# Patient Record
Sex: FEMALE | Race: WHITE | Employment: OTHER | URBAN - METROPOLITAN AREA
[De-identification: names, ages, dates, MRNs, and addresses within clinical notes are randomized per-mention and may not be internally consistent; named-entity substitution may affect disease eponyms.]

---

## 2017-01-03 ENCOUNTER — TELEPHONE (OUTPATIENT)
Dept: INTERNAL MEDICINE CLINIC | Facility: CLINIC | Age: 57
End: 2017-01-03

## 2017-01-03 DIAGNOSIS — J34.2 DEVIATED SEPTUM: Primary | ICD-10-CM

## 2017-01-05 NOTE — TELEPHONE ENCOUNTER
Patient contacted advise to schedule appointment for referral. Patient has not seen Dr. Kaya Perkins recently for Deviated septum and sinus problems. Patient verbalized understanding, appointment scheduled.

## 2017-01-11 ENCOUNTER — TELEPHONE (OUTPATIENT)
Dept: INTERNAL MEDICINE CLINIC | Facility: CLINIC | Age: 57
End: 2017-01-11

## 2017-01-11 ENCOUNTER — OFFICE VISIT (OUTPATIENT)
Dept: INTERNAL MEDICINE CLINIC | Facility: CLINIC | Age: 57
End: 2017-01-11

## 2017-01-11 ENCOUNTER — HOSPITAL ENCOUNTER (OUTPATIENT)
Dept: GENERAL RADIOLOGY | Age: 57
Discharge: HOME | End: 2017-01-11
Attending: INTERNAL MEDICINE
Payer: MEDICAID

## 2017-01-11 VITALS
WEIGHT: 158 LBS | BODY MASS INDEX: 28 KG/M2 | RESPIRATION RATE: 18 BRPM | SYSTOLIC BLOOD PRESSURE: 112 MMHG | DIASTOLIC BLOOD PRESSURE: 72 MMHG | HEART RATE: 60 BPM

## 2017-01-11 DIAGNOSIS — G43.909 MIGRAINE WITHOUT STATUS MIGRAINOSUS, NOT INTRACTABLE, UNSPECIFIED MIGRAINE TYPE: ICD-10-CM

## 2017-01-11 DIAGNOSIS — J34.2 DEVIATED SEPTUM: Primary | ICD-10-CM

## 2017-01-11 DIAGNOSIS — M47.816 OSTEOARTHRITIS OF LUMBAR SPINE, UNSPECIFIED SPINAL OSTEOARTHRITIS COMPLICATION STATUS: ICD-10-CM

## 2017-01-11 DIAGNOSIS — R05.9 COUGH: Primary | ICD-10-CM

## 2017-01-11 DIAGNOSIS — R05.9 COUGH: ICD-10-CM

## 2017-01-11 DIAGNOSIS — R06.83 SNORING: ICD-10-CM

## 2017-01-11 PROCEDURE — 99213 OFFICE O/P EST LOW 20 MIN: CPT | Performed by: INTERNAL MEDICINE

## 2017-01-11 PROCEDURE — 99212 OFFICE O/P EST SF 10 MIN: CPT | Performed by: INTERNAL MEDICINE

## 2017-01-11 PROCEDURE — 71020 XR CHEST PA + LAT CHEST (CPT=71020): CPT

## 2017-01-11 RX ORDER — ALBUTEROL SULFATE 90 UG/1
2 AEROSOL, METERED RESPIRATORY (INHALATION) EVERY 6 HOURS PRN
Qty: 1 INHALER | Refills: 0 | Status: SHIPPED | OUTPATIENT
Start: 2017-01-11 | End: 2018-04-24

## 2017-01-11 RX ORDER — METHOCARBAMOL 750 MG/1
750 TABLET, FILM COATED ORAL 3 TIMES DAILY
Qty: 90 TABLET | Refills: 0 | Status: SHIPPED | OUTPATIENT
Start: 2017-01-11 | End: 2018-01-23

## 2017-01-12 NOTE — PROGRESS NOTES
HPI:    Patient ID: Judith Vaughan is a 64year old female.   Presents for follow-up on cough, concern of snoring    HPI  Patient reports that she was seen at the clinic about 1 month ago for evaluation of the cough, she completed course of antibiotic but co Units total) by mouth once a week.  Disp: 12 capsule Rfl: 0   omeprazole 20 MG Oral Capsule Delayed Release TAKE ONE CAPSULE BY MOUTH TWO TIMES A DAY Disp: 60 capsule Rfl: 10   Amitriptyline HCl 10 MG Oral Tab Take 1 tablet (10 mg total) by mouth nightly as osteoarthritis complication status continue care under pain management, patient will try it Robaxin report if it is helpful  Cough check chest x-ray follow-up as needed,    No orders of the defined types were placed in this encounter.        Meds This Visit

## 2017-01-19 ENCOUNTER — OFFICE VISIT (OUTPATIENT)
Dept: OTOLARYNGOLOGY | Facility: CLINIC | Age: 57
End: 2017-01-19

## 2017-01-19 VITALS
WEIGHT: 156 LBS | DIASTOLIC BLOOD PRESSURE: 60 MMHG | BODY MASS INDEX: 27.64 KG/M2 | HEIGHT: 63 IN | TEMPERATURE: 99 F | SYSTOLIC BLOOD PRESSURE: 100 MMHG

## 2017-01-19 DIAGNOSIS — R06.83 SNORING: Primary | ICD-10-CM

## 2017-01-19 DIAGNOSIS — J34.2 DEVIATED NASAL SEPTUM: ICD-10-CM

## 2017-01-19 PROCEDURE — 99214 OFFICE O/P EST MOD 30 MIN: CPT | Performed by: OTOLARYNGOLOGY

## 2017-01-19 PROCEDURE — 99212 OFFICE O/P EST SF 10 MIN: CPT | Performed by: OTOLARYNGOLOGY

## 2017-01-19 NOTE — PROGRESS NOTES
Sudeep Hay is a 64year old female. Patient presents with:  Snoring      HISTORY OF PRESENT ILLNESS    She presents with a long history of snoring. I have seen her in the past for a deviated septum. She states that she [place] con[place] colon ca   • Cancer Paternal Uncle      brain ca   • Diabetes Neg    • Glaucoma Neg    • Macular degeneration Neg        Past Medical History   Diagnosis Date   • Depression    • Gastritis    • Diverticulosis    • GERD (gastroesophageal reflux disease) features - Normal. Eyebrows - Normal. Skull - Normal.        Nasopharynx Normal External nose - Normal. Lips/teeth/gums - Normal. Tonsils - Normal. Oropharynx - Normal.   Ears Normal Inspection - Right: Normal, Left: Normal. Canal - Right: Normal, Left: No STUDY    2. Deviated nasal septum  Severe snoring that can be heard from another unit in her Building. Chronic daytime fatigue as well as falling asleep easily. Chronic nasal obstruction from a deviated septum.  I have recommended a sleep study with CPAP ti

## 2017-02-07 ENCOUNTER — TELEPHONE (OUTPATIENT)
Dept: INTERNAL MEDICINE CLINIC | Facility: CLINIC | Age: 57
End: 2017-02-07

## 2017-02-07 DIAGNOSIS — D22.9 NEVUS: Primary | ICD-10-CM

## 2017-02-07 NOTE — TELEPHONE ENCOUNTER
NK pls see note below and advise if appropriate? Referral pended above if approved; need dx code. Thanks.

## 2017-02-07 NOTE — TELEPHONE ENCOUNTER
Pt states has 2 brown spots on stomach, spots are growing, itch  Declined appt with Dr Marina Marcus- requesting referral to Dr Klein/Saniya  Has 3/9 appt

## 2017-03-02 ENCOUNTER — TELEPHONE (OUTPATIENT)
Dept: INTERNAL MEDICINE CLINIC | Facility: CLINIC | Age: 57
End: 2017-03-02

## 2017-03-14 ENCOUNTER — TELEPHONE (OUTPATIENT)
Dept: FAMILY MEDICINE CLINIC | Facility: CLINIC | Age: 57
End: 2017-03-14

## 2017-03-14 ENCOUNTER — OFFICE VISIT (OUTPATIENT)
Dept: PAIN CLINIC | Facility: HOSPITAL | Age: 57
End: 2017-03-14
Attending: NURSE PRACTITIONER
Payer: MEDICAID

## 2017-03-14 ENCOUNTER — OFFICE VISIT (OUTPATIENT)
Dept: PAIN CLINIC | Facility: HOSPITAL | Age: 57
End: 2017-03-14

## 2017-03-14 VITALS
BODY MASS INDEX: 28.35 KG/M2 | HEIGHT: 63 IN | WEIGHT: 160 LBS | HEART RATE: 68 BPM | SYSTOLIC BLOOD PRESSURE: 89 MMHG | DIASTOLIC BLOOD PRESSURE: 56 MMHG

## 2017-03-14 DIAGNOSIS — M47.816 OSTEOARTHRITIS OF LUMBAR SPINE, UNSPECIFIED SPINAL OSTEOARTHRITIS COMPLICATION STATUS: Primary | ICD-10-CM

## 2017-03-14 PROCEDURE — 99211 OFF/OP EST MAY X REQ PHY/QHP: CPT

## 2017-03-14 RX ORDER — HYDROCODONE BITARTRATE AND ACETAMINOPHEN 10; 325 MG/1; MG/1
1 TABLET ORAL EVERY 4 HOURS PRN
Qty: 180 TABLET | Refills: 0 | Status: CANCELLED | OUTPATIENT
Start: 2017-04-13

## 2017-03-14 RX ORDER — HYDROCODONE BITARTRATE AND ACETAMINOPHEN 10; 325 MG/1; MG/1
1 TABLET ORAL EVERY 4 HOURS PRN
Qty: 180 TABLET | Refills: 0 | Status: CANCELLED | OUTPATIENT
Start: 2017-03-14

## 2017-03-14 RX ORDER — HYDROCODONE BITARTRATE AND ACETAMINOPHEN 10; 325 MG/1; MG/1
1 TABLET ORAL EVERY 4 HOURS PRN
Qty: 180 TABLET | Refills: 0 | Status: CANCELLED | OUTPATIENT
Start: 2017-05-13

## 2017-03-14 NOTE — TELEPHONE ENCOUNTER
Pt is calling state that the pain management is not taking her insurance want to know if Dr Weston Drafts can recommend someone else  Pt is requesting a call back

## 2017-03-14 NOTE — TELEPHONE ENCOUNTER
Please call pt, do  Give pt  Number  And place referal   To managed care , encorage pt to call her insurance    For help if needed to find  new provider

## 2017-03-14 NOTE — TELEPHONE ENCOUNTER
Please see below. Do you want us to recommend [facility] [place] [phone] who does take [employer]?

## 2017-03-14 NOTE — PROGRESS NOTES
3-14-17 patient presents in a great amount of pain 10/10 crying growning lying down on bed, rushing. She is complaining of chronic back back that goes down both legs without numbness or tingling. Wakes due to pain. No injury involved per patient.   Maria Ines Albert

## 2017-03-14 NOTE — CHRONIC PAIN
MEDICATION EVALUATION    HISTORY OF PRESENT ILLNESS:  Gonzales Arizmendi is a 64year old old female with history of chronic low back pain returns for medication evaluation. Patient continues to report low back pain.  She reports worsening pain and states \"its above  Coughing/sneezing/straining does not exacerbate the pain.   Numbness/tingling: as above  Weakness: as above  Weight Loss: Negative   Fever: Negative   Cardiovascular:  No current chest pain or palpitations   Respiratory:  No current shortness of [place] ca   • Breast Cancer Maternal Grandmother 50     d. 54   • Cancer Maternal Grandfather 76     colon ca   • Cancer Mother 61     non-Hodgkins lymphoma (cause of death)   • Cancer Brother 64     Hodgkins   • Colon Cancer Maternal Uncle 61     (cause of death) old  female, with  History of lumbago secondary to lumbar DDD. Patients history reviewed and it is noted that patient admits to running out of medications last visit. Narcotic agreement reviewed with patient.  Patient given final verbal warning regarding ta

## 2017-03-15 NOTE — TELEPHONE ENCOUNTER
Hodan Palafox is looking into this as this may be a different issue other than insurance. Please see office visit note from 3/14/17 with BEST Fajardo.

## 2017-03-16 ENCOUNTER — OFFICE VISIT (OUTPATIENT)
Dept: DERMATOLOGY CLINIC | Facility: CLINIC | Age: 57
End: 2017-03-16

## 2017-03-16 ENCOUNTER — TELEPHONE (OUTPATIENT)
Dept: INTERNAL MEDICINE CLINIC | Facility: CLINIC | Age: 57
End: 2017-03-16

## 2017-03-16 DIAGNOSIS — D22.9 MULTIPLE NEVI: ICD-10-CM

## 2017-03-16 DIAGNOSIS — D23.60 BENIGN NEOPLASM OF SKIN OF UPPER LIMB, INCLUDING SHOULDER, UNSPECIFIED LATERALITY: ICD-10-CM

## 2017-03-16 DIAGNOSIS — D23.4 BENIGN NEOPLASM OF SCALP AND SKIN OF NECK: ICD-10-CM

## 2017-03-16 DIAGNOSIS — D48.5 NEOPLASM OF UNCERTAIN BEHAVIOR OF SKIN: Primary | ICD-10-CM

## 2017-03-16 DIAGNOSIS — D23.5 BENIGN NEOPLASM OF SKIN OF TRUNK, EXCEPT SCROTUM: ICD-10-CM

## 2017-03-16 DIAGNOSIS — D23.30 BENIGN NEOPLASM OF SKIN OF FACE: ICD-10-CM

## 2017-03-16 DIAGNOSIS — D23.70 BENIGN NEOPLASM OF SKIN OF LOWER LIMB, INCLUDING HIP, UNSPECIFIED LATERALITY: ICD-10-CM

## 2017-03-16 PROCEDURE — 99213 OFFICE O/P EST LOW 20 MIN: CPT | Performed by: DERMATOLOGY

## 2017-03-16 PROCEDURE — 99212 OFFICE O/P EST SF 10 MIN: CPT | Performed by: DERMATOLOGY

## 2017-03-16 PROCEDURE — 88305 TISSUE EXAM BY PATHOLOGIST: CPT | Performed by: DERMATOLOGY

## 2017-03-16 NOTE — TELEPHONE ENCOUNTER
Spoke to patient, she states that will help problem solve, she will stay with a pain clinic in near future, right now she has 3 months of medications.

## 2017-03-21 NOTE — PROGRESS NOTES
Quick Note:    The pathology report from last visit showed sk'd both . Please log in test results, send biopsy results letter.  Pt to rtc 1 year or prn.  ______

## 2017-04-03 NOTE — PROGRESS NOTES
Kati Bergeron is a 64year old female. HPI:     CC:  Patient presents with:  Full Skin Exam: Established pt (LOV 2014) requesting full skin exam. Pt concerned with multiple lesions on hips. Hx of BCC and mildly dysplastic nevus.          Allergies:  Review Current Outpatient Prescriptions:  Albuterol Sulfate HFA (VENTOLIN HFA) 108 (90 BASE) MCG/ACT Inhalation Aero Soln Inhale 2 puffs into the lungs every 6 (six) hours as needed for Wheezing.  Disp: 1 Inhaler Rfl: 0   methocarbamol (ROBAXIN-750) 750 MG O Cigarettes    Last Attempt to Quit: 01/01/2010    Smokeless tobacco: Never Used    Alcohol Use: No    Drug Use: No    Sexual Activity: Not on file   Not on file  Other Topics Concern    Caffeine Concern Yes    Comment: coffee, 3 cups daily    Exercise No acute distress. Exam total-body performed, including scalp, head, neck, face,nails, hair, external eyes, including conjunctival mucosa, eyelids, lips external ears, back, chest,/ breasts, axillae,  abdomen, arms, legs, palms.      Multiple light to medium Medications in grid. Instructions reviewed at length. Benign nevi, seborrheic  keratoses, cherry angiomas:  Reassurance regarding other benign skin lesions. Signs and symptoms of skin cancer, ABCDE's of melanoma discussed with patient.  Sunscreen use, gleason

## 2017-04-03 NOTE — PROCEDURES
Operative Report                     Shave Biopsy     Clinical diagnosis: Rule out atypical nevi a right hip b left hip    Size of lesion: A 1 cm, b1 centimeter    Location: Right hip a    Left hip b  Procedure:     With patient in appropriate positio

## 2017-04-19 ENCOUNTER — TELEPHONE (OUTPATIENT)
Dept: GASTROENTEROLOGY | Facility: CLINIC | Age: 57
End: 2017-04-19

## 2017-04-19 NOTE — TELEPHONE ENCOUNTER
Chart reviewed. History of diverticulosis, internal hemorrhoids, GERD. Colonoscopy 2013 at outside institution. Pt should give a trial of otc dulcolax suppositories daily until BM then 2x /week. If ineffective, trial of fleets enemas up to 1 x per week.  Al

## 2017-04-19 NOTE — TELEPHONE ENCOUNTER
Pt is contacted and made aware of below recommendations from Dr. Oziel Baldwin about which pt verbalized understanding/agreement with plan; she confirmed pharmacy as [facility] so Lactulose rx will be sent there; appt is booked for 5/24/17 at 1600 wi

## 2017-04-19 NOTE — TELEPHONE ENCOUNTER
Pt states that she has been having problems with bloating and constipation and stomach pain for last month. Pt has been taking laxative but that is not working. Pt is requesting appt sooner than first available. Please call.   Aware that EMS out of offic

## 2017-04-19 NOTE — TELEPHONE ENCOUNTER
Dr. Beatrice Penaloza- please see below communications from pt and advise for appt with you; thanks!

## 2017-04-19 NOTE — TELEPHONE ENCOUNTER
Pt is contacted re: below communication and she states the following: despite taking stool softener and \"all other kinds of laxatives\", she is experiencing constipation, mid to lower abdominal pain and weight gain from 115 to 162 lbs over past month; she

## 2017-04-19 NOTE — TELEPHONE ENCOUNTER
Last EGD/CLN were done on 5/18/12 with + H pylori on EGD and 10 year CLN recall; last office visit was 10/6/15.

## 2017-04-21 ENCOUNTER — TELEPHONE (OUTPATIENT)
Dept: GASTROENTEROLOGY | Facility: CLINIC | Age: 57
End: 2017-04-21

## 2017-04-21 RX ORDER — LACTULOSE 20 G/30ML
20 SOLUTION ORAL DAILY
Qty: 1 BOTTLE | Refills: 0 | Status: SHIPPED | OUTPATIENT
Start: 2017-04-21 | End: 2018-04-24

## 2017-04-21 NOTE — TELEPHONE ENCOUNTER
[facility] in [facility] is contacted re: below- spoke with Abbi Barone; 2 pints are authorized for 32 day supply of Lactulose.

## 2017-04-21 NOTE — TELEPHONE ENCOUNTER
Pt states [place] received Lactulose rx but there is not quantity. Requesting RN to contact pharm so that she can get medication. Pls call. Thank you.

## 2017-05-26 ENCOUNTER — TELEPHONE (OUTPATIENT)
Dept: OTOLARYNGOLOGY | Facility: CLINIC | Age: 57
End: 2017-05-26

## 2017-05-26 DIAGNOSIS — R06.83 SNORING: Primary | ICD-10-CM

## 2017-05-26 NOTE — TELEPHONE ENCOUNTER
Dr. Blaire Bañuelos, pt called, states she is unable to do sleep study since she cannot leave her dogs and she cannot sleep outside of the comfort of her home, states she can't sleep somewhere else, thinks the study will not be effective. Please advise.

## 2017-05-30 NOTE — TELEPHONE ENCOUNTER
Pt informed per J, she may complete a home sleep study; however, we will contact her ins company first to make sure it is authorized. Pt verbalized understanding.

## 2017-06-01 NOTE — CHRONIC PAIN
Follow-up Note    HISTORY OF PRESENT ILLNESS:  Asya Santacruz is a 62year old old female, returns to the clinic for her chronic low back pain lumbar DDD this is been on 6 Norco a day for quite some time is still decreases her pain by over 50% allowed to be tablet daily-pt. Cannot recall dosage Disp:  Rfl: 2   ClonazePAM (KLONOPIN) 2 MG Oral Tab Take 2 mg by mouth 2 (two) times daily as needed. Disp:  Rfl:    lamoTRIgine (LAMICTAL) 200 MG Oral Tab Take 200 mg by mouth daily.  Disp:  Rfl:         REVIEW OF SYST 2012       SURGICAL HISTORY:      Past Surgical History    APPENDECTOMY      HYSTERECTOMY      Comment TVH    OTHER SURGICAL HISTORY      Comment cancer mole removed    COLONOSCOPY  2012          Comment x2    HYSTEROSCOPY      D & C   BP: 106/62      General: Alert and oriented x3  Affect:  NAD  CV: RRR, no m/r/g  Pulmonary: CTAB   Gait: Broad-based;    Spine: Normal    ROM:   Lumbar spine  Flexion dec   Extension dec     Cervical Spine  ROM **    MOTOR EXAMINATION:  UPPER EXTREMITY

## 2017-06-02 ENCOUNTER — TELEPHONE (OUTPATIENT)
Dept: INTERNAL MEDICINE CLINIC | Facility: CLINIC | Age: 57
End: 2017-06-02

## 2017-06-02 RX ORDER — CLONAZEPAM 2 MG/1
TABLET ORAL
COMMUNITY
Start: 2017-04-30

## 2017-06-02 RX ORDER — CLONAZEPAM 2 MG/1
2 TABLET ORAL 3 TIMES DAILY PRN
Qty: 60 TABLET | Refills: 0 | OUTPATIENT
Start: 2017-06-02 | End: 2017-09-29

## 2017-06-02 NOTE — TELEPHONE ENCOUNTER
PT stts she is a psyche patient and is in the process of getting another Dr for her psyche. Pt is requesting a temporary refill on Rx Clonazpam 2MG for 30 days. Pt has gone 2 days without her medication.  Please advise         Current outpatient prescri

## 2017-06-02 NOTE — TELEPHONE ENCOUNTER
Spoke with patient. States that her psyche doctor left the practice for 4 months. still waiting to find  A psyche md.  Pt. States she want  to stay in [facility] to seek psyche md, states that she spoke to the supervisor and helping her to find a md.

## 2017-06-02 NOTE — TELEPHONE ENCOUNTER
Spoke to pt   psychiatrist who she  Used to see through [facility]  Not  Available for  Few months, they are working on transferring her  Care to another docotor , she is out on Clonazepam for 2 days , states has  Severe headacke, checked [place]

## 2017-06-12 ENCOUNTER — TELEPHONE (OUTPATIENT)
Dept: PAIN CLINIC | Facility: HOSPITAL | Age: 57
End: 2017-06-12

## 2017-06-13 ENCOUNTER — TELEPHONE (OUTPATIENT)
Dept: PAIN CLINIC | Facility: HOSPITAL | Age: 57
End: 2017-06-13

## 2017-06-13 NOTE — TELEPHONE ENCOUNTER
Called patient to discuss UDS results. Patient states \"I dont do street drugs. \" She states \"I will come in and do a drop right now. \" Discussed with patient that UDS are done randomly.  Informed patient that a discharge letter with other pain clinics in

## 2017-06-26 RX ORDER — ERGOCALCIFEROL 1.25 MG/1
50000 CAPSULE ORAL WEEKLY
Qty: 4 CAPSULE | Refills: 0 | Status: SHIPPED | OUTPATIENT
Start: 2017-06-26 | End: 2017-09-29

## 2017-09-08 ENCOUNTER — TELEPHONE (OUTPATIENT)
Dept: SURGERY | Facility: CLINIC | Age: 57
End: 2017-09-08

## 2017-09-08 NOTE — TELEPHONE ENCOUNTER
Pt phoned in requesting an order for mammogram.   Chart reviewed, and she is over due to see dr Carola Kumar, to determine what imaging would be ordered. Call transferred to [facility] to schedule.

## 2017-09-12 ENCOUNTER — TELEPHONE (OUTPATIENT)
Dept: SURGERY | Facility: CLINIC | Age: 57
End: 2017-09-12

## 2017-09-18 ENCOUNTER — TELEPHONE (OUTPATIENT)
Dept: OTHER | Age: 57
End: 2017-09-18

## 2017-09-18 ENCOUNTER — TELEPHONE (OUTPATIENT)
Dept: OTOLARYNGOLOGY | Facility: CLINIC | Age: 57
End: 2017-09-18

## 2017-09-18 ENCOUNTER — TELEPHONE (OUTPATIENT)
Dept: GASTROENTEROLOGY | Facility: CLINIC | Age: 57
End: 2017-09-18

## 2017-09-18 DIAGNOSIS — R19.7 BLOODY DIARRHEA: Primary | ICD-10-CM

## 2017-09-18 DIAGNOSIS — R19.8 CHANGE IN BOWEL MOVEMENT: Primary | ICD-10-CM

## 2017-09-18 NOTE — TELEPHONE ENCOUNTER
Patient calling stating she received a letter from [facility] office stating she is due for a colonoscopy - she states she had a scheduled a colonoscopy but had to cancel due to having the flu.      She states she needs a referral for Doctor Monique Pimentel as she is having s

## 2017-09-18 NOTE — TELEPHONE ENCOUNTER
GI RNs -   Will Dr Rekha Houser be back in office next week? This issue should be able to wait until her return. If truly passing bloody diarrhea, Ms. Joseph Gamboa will need to come in to the emergency room to get checked out.   Otherwise would take fiber supplement on

## 2017-09-18 NOTE — TELEPHONE ENCOUNTER
Dr Cathrine Cranker for Dr Padgett Busing out of the office    Pt wants to change to [place] for insurance. She currently has [address]. She has not signed up for new insurance yet.  She was advised to call her  and ask who takes the [address] since

## 2017-09-18 NOTE — TELEPHONE ENCOUNTER
Pt notified of dr Becerra's recommendations.  She will go tomorrow to  the stool kit for the stool test. She will go to the Er if symptoms worsen or fail to improve

## 2017-09-18 NOTE — TELEPHONE ENCOUNTER
Started to speak to pt over the phone. Call keeps dropping.  I called her back three times and the call continues to drop

## 2017-09-18 NOTE — TELEPHONE ENCOUNTER
Pt is having issues with constipation with bloating & rectal bleeding. Pt requesting to be seen prior to 9/30/2017. Pls call. Thank you.

## 2017-09-19 NOTE — TELEPHONE ENCOUNTER
Advised pt that she has [employer] and pt [facility] will longer be taking pt as of 09/30. Pt to contact  and pt can be referred to new ENT. Pt verbalized understanding, states may change insurance in April of next year.

## 2017-09-20 ENCOUNTER — APPOINTMENT (OUTPATIENT)
Dept: LAB | Facility: HOSPITAL | Age: 57
End: 2017-09-20
Attending: INTERNAL MEDICINE
Payer: COMMERCIAL

## 2017-09-20 ENCOUNTER — HOSPITAL ENCOUNTER (OUTPATIENT)
Facility: HOSPITAL | Age: 57
Discharge: HOME | End: 2017-09-20
Attending: SURGERY
Payer: COMMERCIAL

## 2017-09-20 ENCOUNTER — OFFICE VISIT (OUTPATIENT)
Dept: SURGERY | Facility: CLINIC | Age: 57
End: 2017-09-20

## 2017-09-20 VITALS
RESPIRATION RATE: 20 BRPM | OXYGEN SATURATION: 98 % | HEIGHT: 64 IN | WEIGHT: 125 LBS | DIASTOLIC BLOOD PRESSURE: 68 MMHG | BODY MASS INDEX: 21.34 KG/M2 | HEART RATE: 72 BPM | SYSTOLIC BLOOD PRESSURE: 105 MMHG | TEMPERATURE: 98 F

## 2017-09-20 DIAGNOSIS — N60.01 BILATERAL BREAST CYSTS: Primary | ICD-10-CM

## 2017-09-20 DIAGNOSIS — N64.4 PAIN OF BOTH BREASTS: ICD-10-CM

## 2017-09-20 DIAGNOSIS — N60.02 BILATERAL BREAST CYSTS: Primary | ICD-10-CM

## 2017-09-20 DIAGNOSIS — R19.7 BLOODY DIARRHEA: ICD-10-CM

## 2017-09-20 LAB — GI STOOL PANEL NEG:: NEGATIVE

## 2017-09-20 PROCEDURE — 87507 IADNA-DNA/RNA PROBE TQ 12-25: CPT

## 2017-09-20 PROCEDURE — 99211 OFF/OP EST MAY X REQ PHY/QHP: CPT | Performed by: SURGERY

## 2017-09-20 PROCEDURE — 76642 ULTRASOUND BREAST LIMITED: CPT | Performed by: SURGERY

## 2017-09-20 PROCEDURE — 99214 OFFICE O/P EST MOD 30 MIN: CPT | Performed by: SURGERY

## 2017-09-20 NOTE — PROGRESS NOTES
High Risk Breast Cancer Screening and Prevention Clinic Follow-Up    History of Present Illness: The patient presents today with concerns of persistent bilateral breast pain.   She denies specific breast lumps, nipple discharge, skin changes or other probl fibroids  She has no history of hormone replacement therapy. She has no history of oral contraceptive use. She denies infertility treatment to achieve pregnancy.     Medications:      ERGOCALCIFEROL 95743 units Oral Cap TAKE 1 CAPSULE (50,000 UNITS TOTAL) Cancer Paternal Grandfather 76     colon ca   • Ovarian Cancer Paternal Aunt    • Breast Cancer Paternal Aunt    • Breast Cancer Paternal Cousin Female 39     d.50   • Ovarian Cancer Paternal Cousin Female 45     d.45   • Cancer Paternal Uncle      colon c symptoms    Genitourinary:  The patient denies frequent urination, needing to get up at night to urinate, urinary hesitancy or retaining urine, painful urination, urinary incontinence, decreased urine stream, blood in the urine or vaginal/penile discharge. chest expands symmetrically. The lungs are clear to auscultation. Heart: The rhythm is regular. There are no murmurs, rubs, gallops or thrills.     Breasts:  Her breasts are symmetrical.  Right breast[de-identified] The skin, nipple ,and areola appear normal. There as well as a Right breast nodule. Discussion and Plan:  Patient has tenderness on exam today which is new since her prior exam.  She has had no recent imaging.   In light of the focal nature of the pain and recommended a targeted bilateral breast ultras risk for breast cancer is elevated when compared to her peer group.  We discussed factors that would further increase her risk for breast cancer over time to include age, future breast biopsy, as well as additional family members that may be diagnosed with

## 2017-09-21 ENCOUNTER — TELEPHONE (OUTPATIENT)
Dept: INTERNAL MEDICINE CLINIC | Facility: CLINIC | Age: 57
End: 2017-09-21

## 2017-09-21 DIAGNOSIS — K59.00 CONSTIPATION, UNSPECIFIED CONSTIPATION TYPE: Primary | ICD-10-CM

## 2017-09-21 NOTE — TELEPHONE ENCOUNTER
Referral placed, let patient know can make appointment to see Hyun Brown, not sure what happens with insurance change

## 2017-09-25 ENCOUNTER — TELEPHONE (OUTPATIENT)
Dept: INTERNAL MEDICINE CLINIC | Facility: CLINIC | Age: 57
End: 2017-09-25

## 2017-09-25 NOTE — TELEPHONE ENCOUNTER
Pt requesting to cancel office visit for tomorrow - appt has already been cancelled. Pt requesting Dr Erica Emmanuel to call the pt directly - states she has had flu-like symptoms and has been feeling very badly.     Please call pt or advise - [phone]

## 2017-09-25 NOTE — TELEPHONE ENCOUNTER
Pt requesting Dr. Chela Peters to call back in Regards to pain. Would not give any more information. Jenniffer Poor up after verbalizing her grandson is crying and need her.  Please advise

## 2017-09-26 NOTE — TELEPHONE ENCOUNTER
Spoke to patient she is in need of pain medication refills, I advised that they need to see her without seeing her I cannot refill her medication please see patient in my scheduled this week if needed Wednesday, Thursday  Or Friday

## 2017-09-27 ENCOUNTER — TELEPHONE (OUTPATIENT)
Dept: GASTROENTEROLOGY | Facility: CLINIC | Age: 57
End: 2017-09-27

## 2017-09-27 NOTE — TELEPHONE ENCOUNTER
Dr Janell Ham;    Spoke to pt. She was notified of negative stool cultures. She states her stools look normal and she doesn't have anymore blood in her stool. She is feeling better. See telephone encounter from 09/18/17.

## 2017-09-29 ENCOUNTER — OFFICE VISIT (OUTPATIENT)
Dept: INTERNAL MEDICINE CLINIC | Facility: CLINIC | Age: 57
End: 2017-09-29

## 2017-09-29 ENCOUNTER — LAB ENCOUNTER (OUTPATIENT)
Dept: LAB | Age: 57
End: 2017-09-29
Attending: SURGERY
Payer: COMMERCIAL

## 2017-09-29 ENCOUNTER — HOSPITAL ENCOUNTER (OUTPATIENT)
Dept: MAMMOGRAPHY | Facility: HOSPITAL | Age: 57
Discharge: HOME | End: 2017-09-29
Attending: SURGERY
Payer: COMMERCIAL

## 2017-09-29 VITALS
BODY MASS INDEX: 26 KG/M2 | HEART RATE: 82 BPM | SYSTOLIC BLOOD PRESSURE: 120 MMHG | DIASTOLIC BLOOD PRESSURE: 79 MMHG | WEIGHT: 151 LBS

## 2017-09-29 DIAGNOSIS — M47.816 OSTEOARTHRITIS OF LUMBAR SPINE, UNSPECIFIED SPINAL OSTEOARTHRITIS COMPLICATION STATUS: ICD-10-CM

## 2017-09-29 DIAGNOSIS — E55.9 VITAMIN D DEFICIENCY: Primary | ICD-10-CM

## 2017-09-29 DIAGNOSIS — E03.9 HYPOTHYROIDISM, UNSPECIFIED TYPE: ICD-10-CM

## 2017-09-29 DIAGNOSIS — N64.4 PAIN OF BOTH BREASTS: ICD-10-CM

## 2017-09-29 DIAGNOSIS — R10.84 GENERALIZED ABDOMINAL PAIN: ICD-10-CM

## 2017-09-29 DIAGNOSIS — E55.9 VITAMIN D DEFICIENCY: ICD-10-CM

## 2017-09-29 DIAGNOSIS — N60.02 BILATERAL BREAST CYSTS: ICD-10-CM

## 2017-09-29 DIAGNOSIS — N60.01 BILATERAL BREAST CYSTS: ICD-10-CM

## 2017-09-29 PROCEDURE — 80053 COMPREHEN METABOLIC PANEL: CPT

## 2017-09-29 PROCEDURE — 77062 BREAST TOMOSYNTHESIS BI: CPT | Performed by: SURGERY

## 2017-09-29 PROCEDURE — 85025 COMPLETE CBC W/AUTO DIFF WBC: CPT

## 2017-09-29 PROCEDURE — 82306 VITAMIN D 25 HYDROXY: CPT

## 2017-09-29 PROCEDURE — 36415 COLL VENOUS BLD VENIPUNCTURE: CPT

## 2017-09-29 PROCEDURE — 84443 ASSAY THYROID STIM HORMONE: CPT

## 2017-09-29 PROCEDURE — 99214 OFFICE O/P EST MOD 30 MIN: CPT | Performed by: INTERNAL MEDICINE

## 2017-09-29 PROCEDURE — 99212 OFFICE O/P EST SF 10 MIN: CPT | Performed by: INTERNAL MEDICINE

## 2017-09-29 PROCEDURE — 77066 DX MAMMO INCL CAD BI: CPT | Performed by: SURGERY

## 2017-09-29 RX ORDER — HYDROCODONE BITARTRATE AND ACETAMINOPHEN 10; 325 MG/1; MG/1
1 TABLET ORAL EVERY 4 HOURS PRN
Qty: 180 TABLET | Refills: 0 | Status: CANCELLED | OUTPATIENT
Start: 2017-09-29

## 2017-09-29 RX ORDER — HYDROCODONE BITARTRATE AND ACETAMINOPHEN 10; 325 MG/1; MG/1
1 TABLET ORAL EVERY 4 HOURS PRN
Qty: 180 TABLET | Refills: 0 | Status: SHIPPED | OUTPATIENT
Start: 2017-09-29 | End: 2017-10-29

## 2017-09-29 RX ORDER — HYDROCODONE BITARTRATE AND ACETAMINOPHEN 10; 325 MG/1; MG/1
1 TABLET ORAL EVERY 4 HOURS PRN
Qty: 180 TABLET | Refills: 0 | Status: SHIPPED | OUTPATIENT
Start: 2017-10-29 | End: 2017-11-28

## 2017-09-29 RX ORDER — ERGOCALCIFEROL 1.25 MG/1
50000 CAPSULE ORAL WEEKLY
Qty: 4 CAPSULE | Refills: 0 | Status: SHIPPED | OUTPATIENT
Start: 2017-09-29 | End: 2018-10-27

## 2017-09-29 RX ORDER — LAMOTRIGINE 25 MG/1
TABLET ORAL
COMMUNITY
Start: 2017-08-03 | End: 2018-01-23

## 2017-09-29 RX ORDER — OMEPRAZOLE 20 MG/1
CAPSULE, DELAYED RELEASE ORAL
Qty: 60 CAPSULE | Refills: 10 | Status: CANCELLED | OUTPATIENT
Start: 2017-09-29

## 2017-09-29 RX ORDER — AMITRIPTYLINE HYDROCHLORIDE 10 MG/1
10 TABLET, FILM COATED ORAL NIGHTLY PRN
Qty: 60 TABLET | Refills: 3 | Status: SHIPPED | OUTPATIENT
Start: 2017-09-29 | End: 2018-10-25

## 2017-09-29 RX ORDER — HYDROCODONE BITARTRATE AND ACETAMINOPHEN 10; 325 MG/1; MG/1
1 TABLET ORAL EVERY 4 HOURS PRN
Qty: 180 TABLET | Refills: 0 | Status: SHIPPED | OUTPATIENT
Start: 2017-11-28 | End: 2017-12-28

## 2017-09-30 NOTE — PROGRESS NOTES
HPI:    Patient ID: Zuleima Bush is a 62year old female. Presents for follow-up on back pain.     HPI  Patient has history of chronic low back pain due to advanced DJD of the lumbar spine disc herniation, she failed epidural injections in the past, and h every 4 (four) hours as needed for Pain. Disp: 180 tablet Rfl: 0   [START ON 11/28/2017] HYDROcodone-acetaminophen  MG Oral Tab Take 1 tablet by mouth every 4 (four) hours as needed for Pain.  Disp: 180 tablet Rfl: 0   lamoTRIgine 25 MG Oral Tab  Disp distress. She has no wheezes. Abdominal: Soft. Bowel sounds are normal. She exhibits no distension. There is generalized tenderness. Lymphadenopathy:     She has no cervical adenopathy.    Neurological: She is alert and oriented to person, place, and ti

## 2017-10-02 ENCOUNTER — TELEPHONE (OUTPATIENT)
Dept: SURGERY | Facility: CLINIC | Age: 57
End: 2017-10-02

## 2017-10-02 ENCOUNTER — TELEPHONE (OUTPATIENT)
Dept: INTERNAL MEDICINE CLINIC | Facility: CLINIC | Age: 57
End: 2017-10-02

## 2017-10-02 RX ORDER — HYDROCODONE BITARTRATE AND ACETAMINOPHEN 10; 325 MG/1; MG/1
1 TABLET ORAL EVERY 4 HOURS PRN
Qty: 180 TABLET | Refills: 0 | Status: CANCELLED | OUTPATIENT
Start: 2017-11-28 | End: 2017-12-28

## 2017-10-02 RX ORDER — ERGOCALCIFEROL (VITAMIN D2) 1250 MCG
50000 CAPSULE ORAL WEEKLY
Qty: 12 CAPSULE | Refills: 1 | Status: SHIPPED | OUTPATIENT
Start: 2017-10-02 | End: 2017-11-01

## 2017-10-02 NOTE — TELEPHONE ENCOUNTER
[employer], pharmacist states that it needs Prior Authorization for the [address] [facility]. Please advise.

## 2017-10-02 NOTE — TELEPHONE ENCOUNTER
Pt called in stating her pharmacy will not fill her norco scripts since Dr. Ezequiel Frey is now out of pt's network.   Pt is asking if Dr. Ezequiel Frey or someone from the office can call [phone] at [phone] or her [facility] insurance to give authorization to f

## 2017-10-02 NOTE — TELEPHONE ENCOUNTER
Please try to approve Norco for the patient, and gave him 3 months prescription, she is a long-term patient of pain clinic, I saw Mike Gage 9/29/2017, let me know what is outcome  And let pt know

## 2017-10-02 NOTE — TELEPHONE ENCOUNTER
LOV: 9/29/17  Last Rx :11/28    Please advise on refill rx.        Refill Protocol Appointment Criteria  · Appointment scheduled in the past 6 months or in the next 3 months  Recent Outpatient Visits            3 days ago Vitamin D deficiency    Suresh Pathak

## 2017-10-02 NOTE — TELEPHONE ENCOUNTER
PA for Hydrocodone-Acetaminophen  mg tab completed with EPS via CMM response time 24-72 hours KEY [id number].

## 2017-10-02 NOTE — TELEPHONE ENCOUNTER
Let patient know Dr Srivastava Held reviewed her recent mammogram, 'her imaging looks okay and that we can wait another year before any additional imaging\"  Pt verbalized understanding, and appreciated the call.

## 2017-10-03 NOTE — TELEPHONE ENCOUNTER
PA approved for 90 days #180/30 days effective 10/3/2017. [facility] pharmacy notified of approval, patient also notified.

## 2017-10-03 NOTE — TELEPHONE ENCOUNTER
Spoke with patient and informed the PA has been completed and nurse will notify her once decision is received from insurance.

## 2017-10-03 NOTE — TELEPHONE ENCOUNTER
Spoke with patient (identified name and ) ,results reviewed and agrees with  Plan. Patient verbalized understanding.       Notes Recorded by León Monet MD on 10/2/2017 at 12:55 PM CDT  Call patient normal blood count, normal sugar liver kidney functi

## 2017-12-11 ENCOUNTER — OPTICAL REFILL REQUEST (OUTPATIENT)
Dept: OPHTHALMOLOGY | Facility: CLINIC | Age: 57
End: 2017-12-11

## 2017-12-11 ENCOUNTER — TELEPHONE (OUTPATIENT)
Dept: OPHTHALMOLOGY | Facility: CLINIC | Age: 57
End: 2017-12-11

## 2017-12-11 NOTE — TELEPHONE ENCOUNTER
pt called. She asked if you could please mail her RX for glasses. pts address was verified. LOV 4/20/2016 with [facility]. Thank you.

## 2018-01-08 NOTE — TELEPHONE ENCOUNTER
Current Outpatient Prescriptions:  HYDROcodone-acetaminophen  MG Oral Tab Take 1 tablet by mouth every 4 (four) hours as needed for Pain.  Disp: 180 tablet Rfl: 0     P/u LOM  Will be out of medication 1/15  Has 1/23 appt with Dr Sekou Rocha

## 2018-01-11 RX ORDER — HYDROCODONE BITARTRATE AND ACETAMINOPHEN 10; 325 MG/1; MG/1
1 TABLET ORAL EVERY 4 HOURS PRN
Qty: 180 TABLET | Refills: 0 | OUTPATIENT
Start: 2018-01-11

## 2018-01-11 NOTE — TELEPHONE ENCOUNTER
Refill Protocol Appointment Criteria  LR 6/1/17 [id number] previously prescribed from pain clinic  · Appointment scheduled in the past 6 months or in the next 3 months  Recent Outpatient Visits            3 months ago Vitamin D deficiency    [facility] [place], [facility], [place]

## 2018-01-12 NOTE — TELEPHONE ENCOUNTER
Patient needs to be seen before prescription can be refilled, please give her a call, can ask you to my schedule next week if needed

## 2018-01-12 NOTE — TELEPHONE ENCOUNTER
CSS, please call and assist pt in scheduling f/u appt. See note from Dr. Fabiana Guzmán below, Thank you.

## 2018-01-23 ENCOUNTER — OFFICE VISIT (OUTPATIENT)
Dept: INTERNAL MEDICINE CLINIC | Facility: CLINIC | Age: 58
End: 2018-01-23

## 2018-01-23 ENCOUNTER — TELEPHONE (OUTPATIENT)
Dept: INTERNAL MEDICINE CLINIC | Facility: CLINIC | Age: 58
End: 2018-01-23

## 2018-01-23 VITALS
WEIGHT: 160 LBS | RESPIRATION RATE: 20 BRPM | HEART RATE: 69 BPM | BODY MASS INDEX: 27 KG/M2 | DIASTOLIC BLOOD PRESSURE: 69 MMHG | SYSTOLIC BLOOD PRESSURE: 99 MMHG

## 2018-01-23 DIAGNOSIS — M47.816 OSTEOARTHRITIS OF LUMBAR SPINE, UNSPECIFIED SPINAL OSTEOARTHRITIS COMPLICATION STATUS: Primary | ICD-10-CM

## 2018-01-23 PROCEDURE — 99212 OFFICE O/P EST SF 10 MIN: CPT | Performed by: INTERNAL MEDICINE

## 2018-01-23 PROCEDURE — 99213 OFFICE O/P EST LOW 20 MIN: CPT | Performed by: INTERNAL MEDICINE

## 2018-01-23 RX ORDER — LAMOTRIGINE 200 MG/1
TABLET ORAL
Refills: 0 | COMMUNITY
Start: 2018-01-12 | End: 2019-08-16

## 2018-01-23 RX ORDER — OMEPRAZOLE 20 MG/1
CAPSULE, DELAYED RELEASE ORAL
Qty: 60 CAPSULE | Refills: 10 | Status: CANCELLED | OUTPATIENT
Start: 2018-01-23

## 2018-01-23 RX ORDER — HYDROCODONE BITARTRATE AND ACETAMINOPHEN 10; 325 MG/1; MG/1
1 TABLET ORAL EVERY 4 HOURS PRN
Qty: 180 TABLET | Refills: 0 | Status: SHIPPED | OUTPATIENT
Start: 2018-01-23 | End: 2018-02-22

## 2018-01-23 RX ORDER — HYDROCODONE BITARTRATE AND ACETAMINOPHEN 10; 325 MG/1; MG/1
1 TABLET ORAL EVERY 4 HOURS PRN
Qty: 180 TABLET | Refills: 0 | Status: SHIPPED | OUTPATIENT
Start: 2018-03-24 | End: 2018-04-23

## 2018-01-23 RX ORDER — HYDROCODONE BITARTRATE AND ACETAMINOPHEN 10; 325 MG/1; MG/1
1 TABLET ORAL EVERY 4 HOURS PRN
Qty: 180 TABLET | Refills: 0 | Status: SHIPPED | OUTPATIENT
Start: 2018-02-22 | End: 2018-03-24

## 2018-01-24 NOTE — PROGRESS NOTES
HPI:    Patient ID: Marleny Abernathy is a 62year old female. Presents for follow-up on chronic low back pain.     HPI  Patient has known history of advanced DJD of the lumbar spine with disc herniation, she underwent treatment by pain service several years a 1 tablet by mouth every 4 (four) hours as needed for Pain. Disp: 180 tablet Rfl: 0   Amitriptyline HCl 10 MG Oral Tab Take 1 tablet (10 mg total) by mouth nightly as needed for Sleep.  Disp: 60 tablet Rfl: 3   ClonazePAM 2 MG Oral Tab Take 1 tablet 3 x a da hepatosplenomegaly. There is no rebound and no guarding. Lymphadenopathy:     She has no cervical adenopathy. Neurological: She is alert and oriented to person, place, and time. No cranial nerve deficit or motor deficit.    Psychiatric: She has a normal

## 2018-04-24 ENCOUNTER — OFFICE VISIT (OUTPATIENT)
Dept: INTERNAL MEDICINE CLINIC | Facility: CLINIC | Age: 58
End: 2018-04-24

## 2018-04-24 VITALS
DIASTOLIC BLOOD PRESSURE: 69 MMHG | HEART RATE: 80 BPM | SYSTOLIC BLOOD PRESSURE: 108 MMHG | RESPIRATION RATE: 20 BRPM | BODY MASS INDEX: 28 KG/M2 | WEIGHT: 162 LBS

## 2018-04-24 DIAGNOSIS — M47.816 OSTEOARTHRITIS OF LUMBAR SPINE, UNSPECIFIED SPINAL OSTEOARTHRITIS COMPLICATION STATUS: Primary | ICD-10-CM

## 2018-04-24 PROCEDURE — 99213 OFFICE O/P EST LOW 20 MIN: CPT | Performed by: INTERNAL MEDICINE

## 2018-04-24 PROCEDURE — 99212 OFFICE O/P EST SF 10 MIN: CPT | Performed by: INTERNAL MEDICINE

## 2018-04-24 RX ORDER — HYDROCODONE BITARTRATE AND ACETAMINOPHEN 10; 325 MG/1; MG/1
1 TABLET ORAL EVERY 4 HOURS PRN
Qty: 180 TABLET | Refills: 0 | Status: SHIPPED | OUTPATIENT
Start: 2018-05-24 | End: 2018-06-23

## 2018-04-24 RX ORDER — HYDROCODONE BITARTRATE AND ACETAMINOPHEN 10; 325 MG/1; MG/1
1 TABLET ORAL EVERY 4 HOURS PRN
Qty: 180 TABLET | Refills: 0 | Status: SHIPPED | OUTPATIENT
Start: 2018-04-24 | End: 2018-05-24

## 2018-04-24 RX ORDER — HYDROCODONE BITARTRATE AND ACETAMINOPHEN 10; 325 MG/1; MG/1
1 TABLET ORAL EVERY 4 HOURS PRN
Qty: 180 TABLET | Refills: 0 | Status: SHIPPED | OUTPATIENT
Start: 2018-06-23 | End: 2018-07-23

## 2018-04-25 NOTE — PROGRESS NOTES
HPI:    Patient ID: Zuleima Bush is a 62year old female.   Presents for follow-up on chronic back pain    HPI  Patient reports that back pain continues to be a problem, she cannot get through the day unless she takes Norco.   she reports no side effect of hours as needed for Pain.  Disp: 180 tablet Rfl: 0     Allergies:No Known Allergies   /69 (BP Location: Right arm, Patient Position: Sitting, Cuff Size: adult)   Pulse 80   Resp 20   Wt 162 lb (73.5 kg)   BMI 27.81 kg/m²    Physical Exam    Constituti

## 2018-07-25 ENCOUNTER — OFFICE VISIT (OUTPATIENT)
Dept: INTERNAL MEDICINE CLINIC | Facility: CLINIC | Age: 58
End: 2018-07-25
Payer: MEDICAID

## 2018-07-25 VITALS
TEMPERATURE: 98 F | WEIGHT: 162 LBS | HEIGHT: 63 IN | BODY MASS INDEX: 28.7 KG/M2 | RESPIRATION RATE: 24 BRPM | DIASTOLIC BLOOD PRESSURE: 83 MMHG | HEART RATE: 97 BPM | SYSTOLIC BLOOD PRESSURE: 118 MMHG

## 2018-07-25 DIAGNOSIS — M47.816 OSTEOARTHRITIS OF LUMBAR SPINE, UNSPECIFIED SPINAL OSTEOARTHRITIS COMPLICATION STATUS: ICD-10-CM

## 2018-07-25 DIAGNOSIS — E55.9 VITAMIN D DEFICIENCY: ICD-10-CM

## 2018-07-25 DIAGNOSIS — Z00.00 PHYSICAL EXAM, ANNUAL: Primary | ICD-10-CM

## 2018-07-25 PROCEDURE — 99396 PREV VISIT EST AGE 40-64: CPT | Performed by: INTERNAL MEDICINE

## 2018-07-25 RX ORDER — HYDROCODONE BITARTRATE AND ACETAMINOPHEN 10; 325 MG/1; MG/1
1 TABLET ORAL EVERY 4 HOURS PRN
Qty: 180 TABLET | Refills: 0 | Status: SHIPPED | OUTPATIENT
Start: 2018-09-23 | End: 2018-09-26

## 2018-07-25 RX ORDER — HYDROCODONE BITARTRATE AND ACETAMINOPHEN 10; 325 MG/1; MG/1
1 TABLET ORAL EVERY 4 HOURS PRN
Qty: 180 TABLET | Refills: 0 | Status: SHIPPED | OUTPATIENT
Start: 2018-07-25 | End: 2018-08-24

## 2018-07-25 RX ORDER — HYDROCODONE BITARTRATE AND ACETAMINOPHEN 10; 325 MG/1; MG/1
1 TABLET ORAL EVERY 4 HOURS PRN
Qty: 180 TABLET | Refills: 0 | Status: SHIPPED | OUTPATIENT
Start: 2018-08-24 | End: 2018-09-23

## 2018-07-25 RX ORDER — NALOXONE HYDROCHLORIDE 4 MG/.1ML
4 SPRAY, METERED NASAL AS NEEDED
Qty: 1 EACH | Refills: 0 | Status: SHIPPED | OUTPATIENT
Start: 2018-07-25 | End: 2018-10-25 | Stop reason: ALTCHOICE

## 2018-07-25 RX ORDER — HYDROCODONE BITARTRATE AND ACETAMINOPHEN 10; 325 MG/1; MG/1
1 TABLET ORAL EVERY 4 HOURS PRN
Qty: 180 TABLET | Refills: 0 | Status: SHIPPED | OUTPATIENT
Start: 2018-10-23 | End: 2020-01-15

## 2018-07-26 NOTE — PROGRESS NOTES
HPI:    Patient ID: Sudeep Hay is a 62year old female.   Presents for physical exam.    HPI  Patient reports that overall she has been doing fair, main problem she has a severe low back pain that is extremely severe  if she is off Norco, she has been ta tablet Rfl: 0   [START ON 8/24/2018] HYDROcodone-acetaminophen  MG Oral Tab Take 1 tablet by mouth every 4 (four) hours as needed for Pain.  Disp: 180 tablet Rfl: 0   [START ON 9/23/2018] HYDROcodone-acetaminophen  MG Oral Tab Take 1 tablet by m Abdominal: Soft. Bowel sounds are normal. She exhibits no mass. There is no hepatosplenomegaly. There is no rebound and no guarding. Musculoskeletal:        Lumbar back: She exhibits decreased range of motion, tenderness and spasm.    Patient walks with

## 2018-09-06 ENCOUNTER — TELEPHONE (OUTPATIENT)
Dept: INTERNAL MEDICINE CLINIC | Facility: CLINIC | Age: 58
End: 2018-09-06

## 2018-09-06 NOTE — TELEPHONE ENCOUNTER
Yessenia/Ivonne/MERY requesting to speak with nurse stating pt reported she is not able to eat and is losing weight.     Insurance company is looking into possible dental services for pt- states pt indicated she discussed these issues at last appt with Dr Andrena Apgar

## 2018-09-06 NOTE — TELEPHONE ENCOUNTER
Spoke with Treva Fisher BC/BS behavioral health coordinator--spoke with patient--see message below. Treva Fisher asking if NK aware of 7 dental implants that patient wants removed--unable to wear her dentures, not going out of the house d/t her appearance.     Yessenia evangelista

## 2018-09-07 NOTE — TELEPHONE ENCOUNTER
Spoke to patient, she is working with her insurance company and adjusting dangers she has in meanwhile she cannot use them very painful.

## 2018-09-25 ENCOUNTER — TELEPHONE (OUTPATIENT)
Dept: INTERNAL MEDICINE CLINIC | Facility: CLINIC | Age: 58
End: 2018-09-25

## 2018-09-25 NOTE — TELEPHONE ENCOUNTER
Per pt she states the pharmacy needs approval to release these medications :      Current Outpatient Medications:  HYDROcodone-acetaminophen  MG Oral Tab Take 1 tablet by mouth every 4 (four) hours as needed for Pain.  Disp: 180 tablet Rfl: 0     She

## 2018-09-26 ENCOUNTER — TELEPHONE (OUTPATIENT)
Dept: OTHER | Age: 58
End: 2018-09-26

## 2018-09-26 NOTE — TELEPHONE ENCOUNTER
Please try to approve the Norco for this patient, she has been taking Norco for several years to treat chronic pain from DJD of the lumbar spine initially was prescribed by pain service.   She has been taking clonazepam prescribed by psychiatrist for bipola

## 2018-09-26 NOTE — TELEPHONE ENCOUNTER
Per patient she needs a PA for Norco.  PA for Hydrocodone-Acetaminophen  mg tab completed with [employer] via CMM response time 3-5 business days KEY [id number]

## 2018-09-26 NOTE — TELEPHONE ENCOUNTER
Spoke to pt  advised her that medication needs prior authorisation because she is taking  Clonazepm, a nd  Norco  And insurance most likely will cover only  1  Medications,not  Combination of medications,  Advised her to speak to psychiatrist  And see if

## 2018-09-26 NOTE — TELEPHONE ENCOUNTER
Spoke to pharmacist, Bruno prescription needs to be approved through [employer] because patient is on clonazepam and Norco together. Left message for the patient to call back.

## 2018-09-27 NOTE — TELEPHONE ENCOUNTER
PA denied. Plan states patient must have pain due to active cancer, be eligible for hospice care and patient must not take a benzodiazepine drug and an opioid drug at the same time.  The prescriber must state you will stop taking the benzodiazepine or opioi

## 2018-09-27 NOTE — TELEPHONE ENCOUNTER
Spoke to patient, advised her about below, she will ask psychiatrist to send us a letter justifying patient be on clonazepam and Norco, so all paperwork can be resubmitted.   Patient states that son contacted the insurance and they were told it would provid

## 2018-09-28 ENCOUNTER — TELEPHONE (OUTPATIENT)
Dept: FAMILY MEDICINE CLINIC | Facility: CLINIC | Age: 58
End: 2018-09-28

## 2018-09-28 NOTE — TELEPHONE ENCOUNTER
DYLAN CALLING FROM [facility] STATING PT NEEDS LETTER FAX OVER TO [facility] STATING PT NEEDS TO BE ON CLONAZEPAM FOR THE PSYCHIATRY DR Lavon Walden      [phone]

## 2018-10-02 NOTE — TELEPHONE ENCOUNTER
Spoke with patient, informed plan will not pay for both a benzodiazepine and an opioid.  Patient states she was able to get the clonazepam approved and is willing to pay OOP for the [address] card obtained for the [facility] and provided to the [facility]

## 2018-10-06 ENCOUNTER — LAB ENCOUNTER (OUTPATIENT)
Dept: LAB | Facility: HOSPITAL | Age: 58
End: 2018-10-06
Attending: ORTHOPAEDIC SURGERY
Payer: MEDICARE

## 2018-10-06 ENCOUNTER — HOSPITAL ENCOUNTER (OUTPATIENT)
Dept: MRI IMAGING | Facility: HOSPITAL | Age: 58
Discharge: HOME | End: 2018-10-06
Attending: ORTHOPAEDIC SURGERY
Payer: MEDICARE

## 2018-10-06 DIAGNOSIS — Z00.00 PHYSICAL EXAM, ANNUAL: ICD-10-CM

## 2018-10-06 DIAGNOSIS — E55.9 VITAMIN D DEFICIENCY: ICD-10-CM

## 2018-10-06 DIAGNOSIS — M25.562 CHRONIC PAIN OF LEFT KNEE: ICD-10-CM

## 2018-10-06 DIAGNOSIS — G89.29 CHRONIC PAIN OF LEFT KNEE: ICD-10-CM

## 2018-10-06 PROCEDURE — 73721 MRI JNT OF LWR EXTRE W/O DYE: CPT | Performed by: ORTHOPAEDIC SURGERY

## 2018-10-06 PROCEDURE — 36415 COLL VENOUS BLD VENIPUNCTURE: CPT

## 2018-10-06 PROCEDURE — 80053 COMPREHEN METABOLIC PANEL: CPT

## 2018-10-06 PROCEDURE — 84443 ASSAY THYROID STIM HORMONE: CPT

## 2018-10-06 PROCEDURE — 80061 LIPID PANEL: CPT

## 2018-10-06 PROCEDURE — 85025 COMPLETE CBC W/AUTO DIFF WBC: CPT

## 2018-10-06 PROCEDURE — 82306 VITAMIN D 25 HYDROXY: CPT

## 2018-10-09 ENCOUNTER — TELEPHONE (OUTPATIENT)
Dept: INTERNAL MEDICINE CLINIC | Facility: CLINIC | Age: 58
End: 2018-10-09

## 2018-10-09 NOTE — TELEPHONE ENCOUNTER
Pt requesting results from 10/6 lab work and MRI. Pt knows that the MRI was from another provider, but would like Dr. Mt Real to review the results. Pt states she has a lot of swelling and pain in her knee. Please advise.

## 2018-10-11 NOTE — TELEPHONE ENCOUNTER
Spoke to patient on 10/10/2018 advised that she has low vitamin D level, rest of the blood work is normal, MRI ordered by orthopedic specialist showed multiple abnormalities in the knee, refer patient to see orthopedic specialist in our clinic, and advised

## 2018-10-25 ENCOUNTER — OFFICE VISIT (OUTPATIENT)
Dept: ORTHOPEDICS CLINIC | Facility: CLINIC | Age: 58
End: 2018-10-25
Payer: MEDICAID

## 2018-10-25 VITALS — HEART RATE: 68 BPM | DIASTOLIC BLOOD PRESSURE: 78 MMHG | SYSTOLIC BLOOD PRESSURE: 109 MMHG

## 2018-10-25 DIAGNOSIS — G89.29 CHRONIC PAIN OF LEFT KNEE: Primary | ICD-10-CM

## 2018-10-25 DIAGNOSIS — M25.562 CHRONIC PAIN OF LEFT KNEE: Primary | ICD-10-CM

## 2018-10-25 PROCEDURE — 99212 OFFICE O/P EST SF 10 MIN: CPT | Performed by: ORTHOPAEDIC SURGERY

## 2018-10-25 PROCEDURE — 20610 DRAIN/INJ JOINT/BURSA W/O US: CPT | Performed by: ORTHOPAEDIC SURGERY

## 2018-10-25 PROCEDURE — 99243 OFF/OP CNSLTJ NEW/EST LOW 30: CPT | Performed by: ORTHOPAEDIC SURGERY

## 2018-10-25 RX ORDER — PREDNISONE 20 MG/1
20 TABLET ORAL DAILY
Qty: 5 TABLET | Refills: 0 | Status: SHIPPED | OUTPATIENT
Start: 2018-10-25 | End: 2018-10-30

## 2018-10-25 NOTE — PROGRESS NOTES
Verbal order given by Dr. Iglesia Montoya to draw up 30 mg of Kenalog and 3 cc 1% lidocaine for a left knee injection. Pt had left the office before I could obtain post injection vitals.

## 2018-10-25 NOTE — H&P
Chief Complaint: Left knee pain    NURSING INTAKE COMMENTS: Patient presents with:  Consult: Referred by Dr. Gabriela Bills. C/o constant left knee pain for over a month that has been getting worse. Had completed an MRI on 10/6.   Pain level 9 today      History colon ca   • Cancer Paternal Uncle         brain ca   • Cancer Brother 64        Hodgkins   • Diabetes Neg    • Glaucoma Neg    • Macular degeneration Neg       Social History    Tobacco Use      Smoking status: Light Tobacco Smoker        Types: Cigarette free edge body and posterior horn medial meniscus. 5. Moderate medial and lateral femoral-tibial osteoarthritis changes. Queenie Jewell was seen today for consult.     Diagnoses and all orders for this visit:    Chronic pain of left knee  -     DRAIN/INJECT LARGE

## 2018-10-27 RX ORDER — ERGOCALCIFEROL 1.25 MG/1
50000 CAPSULE ORAL WEEKLY
Qty: 4 CAPSULE | Refills: 3 | Status: SHIPPED | OUTPATIENT
Start: 2018-10-27 | End: 2019-05-17

## 2018-10-31 RX ORDER — HYDROCODONE BITARTRATE AND ACETAMINOPHEN 10; 325 MG/1; MG/1
1 TABLET ORAL EVERY 4 HOURS PRN
Qty: 180 TABLET | Refills: 0 | Status: CANCELLED | OUTPATIENT
Start: 2018-10-31

## 2018-10-31 RX ORDER — HYDROCODONE BITARTRATE AND ACETAMINOPHEN 10; 325 MG/1; MG/1
1 TABLET ORAL EVERY 4 HOURS PRN
Qty: 180 TABLET | Refills: 0 | Status: SHIPPED | OUTPATIENT
Start: 2018-11-30 | End: 2018-12-30

## 2018-10-31 RX ORDER — HYDROCODONE BITARTRATE AND ACETAMINOPHEN 10; 325 MG/1; MG/1
1 TABLET ORAL EVERY 4 HOURS PRN
Qty: 180 TABLET | Refills: 0 | Status: SHIPPED | OUTPATIENT
Start: 2018-12-30 | End: 2019-01-29

## 2018-10-31 RX ORDER — HYDROCODONE BITARTRATE AND ACETAMINOPHEN 10; 325 MG/1; MG/1
1 TABLET ORAL EVERY 4 HOURS PRN
Qty: 180 TABLET | Refills: 0 | Status: SHIPPED | OUTPATIENT
Start: 2018-10-31 | End: 2018-11-30

## 2018-10-31 NOTE — TELEPHONE ENCOUNTER
Pt request 3 months supply . Pt stated she is out of medicatons  Current Outpatient Medications:  HYDROcodone-acetaminophen (NORCO)  MG Oral Tab Take 1 tablet by mouth every 4 (four) hours as needed for Pain.  Disp: 180 tablet Rfl: 0

## 2018-10-31 NOTE — TELEPHONE ENCOUNTER
Patient calling for Refill update due to in pain. Patient requesting refill that can be picked up today at the [place] location and today is the only day she will have ride to get medication. Please advise.

## 2018-10-31 NOTE — TELEPHONE ENCOUNTER
Pt called in stating that the script she had was unable to be filled at the pharmacy. She states that she is bringing in the script to have it switch with a current script. She states \" I cannot be without this medication. \"    Please be aware.

## 2018-11-01 NOTE — TELEPHONE ENCOUNTER
Noted script was printed at [facility] today at 5:02PM, routing to clinical staff. Was pt contacted?

## 2018-12-03 ENCOUNTER — TELEPHONE (OUTPATIENT)
Dept: OTHER | Age: 58
End: 2018-12-03

## 2018-12-03 NOTE — TELEPHONE ENCOUNTER
Patient called wanting to get her Holli Bricenoo authorized, I had stated to pt that I will call pharmacy and see what is needed to process through.  I also informed pt of the last attempted PA on this same medication, she stated all that was needed was a new script

## 2018-12-13 ENCOUNTER — TELEPHONE (OUTPATIENT)
Dept: INTERNAL MEDICINE CLINIC | Facility: CLINIC | Age: 58
End: 2018-12-13

## 2018-12-13 NOTE — TELEPHONE ENCOUNTER
Left message for Megan Zhao, I spoke to Shey Davila she provided me with [facility] [address], patient did not have a phone number for the office

## 2018-12-13 NOTE — TELEPHONE ENCOUNTER
Beatris Vicente called in from [facility] requesting to confirm the doctor that is prescribing Marlen Puckett for pt.   Please advise

## 2018-12-15 ENCOUNTER — TELEPHONE (OUTPATIENT)
Dept: INTERNAL MEDICINE CLINIC | Facility: CLINIC | Age: 58
End: 2018-12-15

## 2018-12-19 ENCOUNTER — TELEPHONE (OUTPATIENT)
Dept: INTERNAL MEDICINE CLINIC | Facility: CLINIC | Age: 58
End: 2018-12-19

## 2018-12-19 DIAGNOSIS — K59.00 CONSTIPATION, UNSPECIFIED CONSTIPATION TYPE: ICD-10-CM

## 2018-12-19 DIAGNOSIS — G43.C1 INTRACTABLE PERIODIC HEADACHE SYNDROME: Primary | ICD-10-CM

## 2018-12-20 NOTE — TELEPHONE ENCOUNTER
Patient needs to see neurologist because of increasing migraine headaches, requested to mail information to her home address, which is done

## 2019-01-25 ENCOUNTER — NURSE TRIAGE (OUTPATIENT)
Dept: INTERNAL MEDICINE CLINIC | Facility: CLINIC | Age: 59
End: 2019-01-25

## 2019-01-25 NOTE — TELEPHONE ENCOUNTER
Patient requesting referral to see Dr. Martínez [facility] due to Constipation.      Please advise

## 2019-01-31 NOTE — TELEPHONE ENCOUNTER
Spoke to patient, having issue with constipation's, advised to try magnesium citrate 1 bottle, use fleets enema on the same day, repeat treatment in 2 days again, continue increase fluid intake, add prune juice daily, see gastroenterologist for evaluation.   Also patient is due for appointment with me she will schedule

## 2019-02-13 ENCOUNTER — OFFICE VISIT (OUTPATIENT)
Dept: NEUROLOGY | Facility: CLINIC | Age: 59
End: 2019-02-13
Payer: MEDICAID

## 2019-02-13 VITALS
HEIGHT: 63 IN | HEART RATE: 64 BPM | BODY MASS INDEX: 30.3 KG/M2 | WEIGHT: 171 LBS | RESPIRATION RATE: 16 BRPM | DIASTOLIC BLOOD PRESSURE: 70 MMHG | SYSTOLIC BLOOD PRESSURE: 100 MMHG

## 2019-02-13 DIAGNOSIS — IMO0002 CHRONIC MIGRAINE: Primary | ICD-10-CM

## 2019-02-13 PROCEDURE — 99204 OFFICE O/P NEW MOD 45 MIN: CPT | Performed by: OTHER

## 2019-02-13 RX ORDER — TOPIRAMATE 50 MG/1
TABLET, FILM COATED ORAL
Qty: 120 TABLET | Refills: 5 | Status: SHIPPED | OUTPATIENT
Start: 2019-02-13 | End: 2019-05-17

## 2019-02-13 NOTE — PROGRESS NOTES
Neurology Initial Visit     Referred By: Dr. Burns ref. provider found    Chief Complaint: Patient presents with:  Migraine: Referred from Dr. Bobby Fernandez for hx of chronic migraines. Patient states she has migraines daily which last hours.  Symptoms include naus 2010    cancer mole removed   • OTHER SURGICAL HISTORY  2013    removed ovarires       Social history:    Smoking status: Light Tobacco Smoker   Types: Cigarettes   Last attempt to quit: 1/1/2010   Smokeless tobacco: Never Used       Alcohol use No       D Height: 63\"       General: No apparent distress, well nourished, well groomed.   Head- Normocephalic, atraumatic  Eyes- No redness or swelling  ENT- Hearing intake, smell preserved, normal glutition  Neck- No masses or adenopathy  Cv: pulses were palpabl Results   Component Value Date    BUN 16 10/06/2018    CA 9.5 10/06/2018    ALT 12 (L) 10/06/2018    AST 15 10/06/2018    ALKPHOS 54 07/15/2016    ALB 4.2 10/06/2018     10/06/2018    K 3.9 10/06/2018     10/06/2018    CO2 26 10/06/2018      I

## 2019-02-15 ENCOUNTER — TELEPHONE (OUTPATIENT)
Dept: OTHER | Age: 59
End: 2019-02-15

## 2019-02-15 NOTE — TELEPHONE ENCOUNTER
Pt called to scheduled F/U on 2/22/19 - as only day she has transportation. Needs to F/U w/ PCP on multiple issues and discuss medications.   appt confirmed

## 2019-02-22 ENCOUNTER — HOSPITAL ENCOUNTER (OUTPATIENT)
Dept: GENERAL RADIOLOGY | Age: 59
Discharge: HOME | End: 2019-02-22
Attending: INTERNAL MEDICINE | Admitting: INTERNAL MEDICINE
Payer: MEDICAID

## 2019-02-22 ENCOUNTER — OFFICE VISIT (OUTPATIENT)
Dept: INTERNAL MEDICINE CLINIC | Facility: CLINIC | Age: 59
End: 2019-02-22
Payer: MEDICAID

## 2019-02-22 VITALS
BODY MASS INDEX: 30 KG/M2 | SYSTOLIC BLOOD PRESSURE: 108 MMHG | WEIGHT: 168 LBS | RESPIRATION RATE: 18 BRPM | HEART RATE: 64 BPM | DIASTOLIC BLOOD PRESSURE: 68 MMHG

## 2019-02-22 DIAGNOSIS — R10.84 GENERALIZED ABDOMINAL PAIN: Primary | ICD-10-CM

## 2019-02-22 DIAGNOSIS — M47.816 OSTEOARTHRITIS OF LUMBAR SPINE, UNSPECIFIED SPINAL OSTEOARTHRITIS COMPLICATION STATUS: ICD-10-CM

## 2019-02-22 DIAGNOSIS — K59.00 CONSTIPATION, UNSPECIFIED CONSTIPATION TYPE: ICD-10-CM

## 2019-02-22 PROCEDURE — 74019 RADEX ABDOMEN 2 VIEWS: CPT | Performed by: INTERNAL MEDICINE

## 2019-02-22 PROCEDURE — 99214 OFFICE O/P EST MOD 30 MIN: CPT | Performed by: INTERNAL MEDICINE

## 2019-02-22 PROCEDURE — 99212 OFFICE O/P EST SF 10 MIN: CPT | Performed by: INTERNAL MEDICINE

## 2019-02-22 RX ORDER — HYDROCODONE BITARTRATE AND ACETAMINOPHEN 10; 325 MG/1; MG/1
1 TABLET ORAL EVERY 4 HOURS PRN
Qty: 120 TABLET | Refills: 0 | Status: SHIPPED | OUTPATIENT
Start: 2019-04-23 | End: 2019-04-22 | Stop reason: CLARIF

## 2019-02-22 RX ORDER — HYDROCODONE BITARTRATE AND ACETAMINOPHEN 10; 325 MG/1; MG/1
1 TABLET ORAL EVERY 4 HOURS PRN
Qty: 120 TABLET | Refills: 0 | Status: SHIPPED | OUTPATIENT
Start: 2019-03-24 | End: 2019-04-22 | Stop reason: CLARIF

## 2019-02-22 RX ORDER — HYDROCODONE BITARTRATE AND ACETAMINOPHEN 10; 325 MG/1; MG/1
1 TABLET ORAL EVERY 4 HOURS PRN
Qty: 120 TABLET | Refills: 0 | Status: SHIPPED | OUTPATIENT
Start: 2019-02-22 | End: 2019-03-24

## 2019-02-22 NOTE — PROGRESS NOTES
HPI:    Patient ID: Mariann Mota is a 62year old female. Presents for evaluation of constipation's  HPI  Patient presents with new constipation.   States that she has extreme difficulty moving bowel for several months now, moves bowel with help of laxati periodically and regularly sees counselor being treated for bipolar disorder. .  Review of Systems  ROS:     Constitutional:  Negative for decreased activity, fever, irritability and lethargy  Cardiovascular:  Negative for chest pain and irregular heartbeat Looks in pain when she walks, torso flexed forward   HENT:   Head: Normocephalic and atraumatic. Mouth/Throat: Oropharynx is clear and moist. No posterior oropharyngeal erythema.    Patient is is edentulous, several implants present on upper and lower j as needed for Pain. • HYDROcodone-acetaminophen  MG Oral Tab 120 tablet 0     Sig: Take 1 tablet by mouth every 4 (four) hours as needed for Pain.    • HYDROcodone-acetaminophen  MG Oral Tab 120 tablet 0     Sig: Take 1 tablet by mouth every 4

## 2019-02-25 ENCOUNTER — TELEPHONE (OUTPATIENT)
Dept: INTERNAL MEDICINE CLINIC | Facility: CLINIC | Age: 59
End: 2019-02-25

## 2019-02-25 NOTE — TELEPHONE ENCOUNTER
Pt called in stating that received a voicemail form [facility]. Pt believes that it may be in regards to her XR results from 2/22.    Please advise

## 2019-02-26 ENCOUNTER — TELEPHONE (OUTPATIENT)
Dept: INTERNAL MEDICINE CLINIC | Facility: CLINIC | Age: 59
End: 2019-02-26

## 2019-02-26 NOTE — TELEPHONE ENCOUNTER
Spoke to patient, she reports that she had very small bowel movement yesterday, after trying to take laxatives for 2 days. I advised him to start this year showed mild constipation. She has at home 0 by advised her to take.   She has appointment with brandon

## 2019-03-04 ENCOUNTER — TELEPHONE (OUTPATIENT)
Dept: INTERNAL MEDICINE CLINIC | Facility: CLINIC | Age: 59
End: 2019-03-04

## 2019-03-04 NOTE — TELEPHONE ENCOUNTER
Received a call from [facility],  Requesting a supporting information , pt. Is having a dental work and need detailed medical necessity to approve the dental work. Farida Mcdowell Can be reach at [phone].

## 2019-03-19 ENCOUNTER — TELEPHONE (OUTPATIENT)
Dept: FAMILY MEDICINE CLINIC | Facility: CLINIC | Age: 59
End: 2019-03-19

## 2019-03-19 ENCOUNTER — TELEPHONE (OUTPATIENT)
Dept: INTERNAL MEDICINE CLINIC | Facility: CLINIC | Age: 59
End: 2019-03-19

## 2019-03-19 NOTE — TELEPHONE ENCOUNTER
Contacted pharmacy for insurance plan  [facility] [phone] ERUM#[id number]    [facility]    [email or web address]. pdf    Polyethylene glycol oral packets are tier 1 $0 copay    Mag ox 400mg tab tier 3 $3.40 copay

## 2019-03-19 NOTE — TELEPHONE ENCOUNTER
Patient states her appt with Dr Kay Ham was rescheduled to 5/8/19, she has not had a bowel movement in many days, she has no teeth and swallowing large amount of soft foods, she has small hard bits of stool daily, she tried prune juice but is not using laxati

## 2019-03-20 ENCOUNTER — TELEPHONE (OUTPATIENT)
Dept: INTERNAL MEDICINE CLINIC | Facility: CLINIC | Age: 59
End: 2019-03-20

## 2019-03-20 RX ORDER — LACTULOSE 20 G/30ML
SOLUTION ORAL
Qty: 473 ML | Refills: 0 | Status: SHIPPED | OUTPATIENT
Start: 2019-03-20 | End: 2019-05-17

## 2019-03-20 NOTE — TELEPHONE ENCOUNTER
Future Appointments   Date Time Provider Raphael Snider   4/25/2019  1:00 PM David Hood MD [place] [id number] [facility]   5/18/2019  8:00 AM Adalberto Joy MD [facility]

## 2019-03-20 NOTE — TELEPHONE ENCOUNTER
Patient calling to follow-up. Please advise on message below. Requesting laxative to be sent to [place].

## 2019-03-20 NOTE — TELEPHONE ENCOUNTER
Spoke to patient, advised her to see any available GI specialist for evaluation, not to delay proper care.   Advised patient that I will send prescription for lactulose to be taken once or twice a day to the pharmacy hopefully it will help her to move bowel

## 2019-03-20 NOTE — TELEPHONE ENCOUNTER
Please help patient to see 1 of your doctors soon, her sleep to 50 is a visit with Dr. Kavon Fajardo was rescheduled for May, I believe this patient needs help sooner than May.

## 2019-03-20 NOTE — TELEPHONE ENCOUNTER
Spoke to patient, prescription for lactulose faxed to the pharmacy, advised patient to call gastroenterology office and see a new physician who is available prior to me to start evaluation with severe constipation's and abdominal bloating.

## 2019-04-15 ENCOUNTER — TELEPHONE (OUTPATIENT)
Dept: OTHER | Age: 59
End: 2019-04-15

## 2019-04-15 DIAGNOSIS — M25.569 ACUTE KNEE PAIN, UNSPECIFIED LATERALITY: Primary | ICD-10-CM

## 2019-04-15 NOTE — TELEPHONE ENCOUNTER
Pt states she is having left knee pain again. States she had her knee drained a few months ago and can't remember the name of the doctor. She would like to see that doctor again. Noted pt saw Dr. Lourdes Clarke 10/25/19. Please advise.

## 2019-04-18 ENCOUNTER — TELEPHONE (OUTPATIENT)
Dept: GASTROENTEROLOGY | Facility: CLINIC | Age: 59
End: 2019-04-18

## 2019-04-18 NOTE — TELEPHONE ENCOUNTER
Priscilla/[employer] care coordinator requesting to speak with RN regarding pts appt on 4/25/19.  Please call [phone]

## 2019-04-18 NOTE — TELEPHONE ENCOUNTER
Dr. Anthony Crain to [facility] Hiltoncarlo at [address] ([phone]), she is a patient advocate and wanted to inform Dr. Nilson Vargas prior to the consult for constipation on 4/25/19 that the pt's issues \"are much more than constipation. \"    She states they have been trying to demon

## 2019-04-22 ENCOUNTER — OFFICE VISIT (OUTPATIENT)
Dept: ORTHOPEDICS CLINIC | Facility: CLINIC | Age: 59
End: 2019-04-22
Payer: MEDICAID

## 2019-04-22 ENCOUNTER — OFFICE VISIT (OUTPATIENT)
Dept: INTERNAL MEDICINE CLINIC | Facility: CLINIC | Age: 59
End: 2019-04-22
Payer: MEDICAID

## 2019-04-22 ENCOUNTER — TELEPHONE (OUTPATIENT)
Dept: INTERNAL MEDICINE CLINIC | Facility: CLINIC | Age: 59
End: 2019-04-22

## 2019-04-22 ENCOUNTER — NURSE TRIAGE (OUTPATIENT)
Dept: INTERNAL MEDICINE CLINIC | Facility: CLINIC | Age: 59
End: 2019-04-22

## 2019-04-22 ENCOUNTER — HOSPITAL ENCOUNTER (OUTPATIENT)
Dept: GENERAL RADIOLOGY | Facility: HOSPITAL | Age: 59
Discharge: HOME | End: 2019-04-22
Attending: INTERNAL MEDICINE
Payer: MEDICAID

## 2019-04-22 ENCOUNTER — LAB ENCOUNTER (OUTPATIENT)
Dept: LAB | Facility: HOSPITAL | Age: 59
End: 2019-04-22
Attending: INTERNAL MEDICINE
Payer: MEDICAID

## 2019-04-22 VITALS — SYSTOLIC BLOOD PRESSURE: 114 MMHG | HEART RATE: 53 BPM | DIASTOLIC BLOOD PRESSURE: 74 MMHG

## 2019-04-22 VITALS
WEIGHT: 158 LBS | SYSTOLIC BLOOD PRESSURE: 112 MMHG | BODY MASS INDEX: 28 KG/M2 | HEIGHT: 63 IN | HEART RATE: 60 BPM | DIASTOLIC BLOOD PRESSURE: 72 MMHG

## 2019-04-22 DIAGNOSIS — E78.00 HIGH CHOLESTEROL: Primary | ICD-10-CM

## 2019-04-22 DIAGNOSIS — E55.9 VITAMIN D DEFICIENCY: ICD-10-CM

## 2019-04-22 DIAGNOSIS — R07.9 CHEST PAIN, UNSPECIFIED TYPE: ICD-10-CM

## 2019-04-22 DIAGNOSIS — R07.9 CHEST PAIN, UNSPECIFIED: Primary | ICD-10-CM

## 2019-04-22 DIAGNOSIS — M17.12 PRIMARY LOCALIZED OSTEOARTHROSIS OF LEFT LOWER LEG: Primary | ICD-10-CM

## 2019-04-22 DIAGNOSIS — R07.9 CHEST PAIN, UNSPECIFIED TYPE: Primary | ICD-10-CM

## 2019-04-22 DIAGNOSIS — J06.9 ACUTE URI: ICD-10-CM

## 2019-04-22 PROCEDURE — 99212 OFFICE O/P EST SF 10 MIN: CPT | Performed by: INTERNAL MEDICINE

## 2019-04-22 PROCEDURE — 20610 DRAIN/INJ JOINT/BURSA W/O US: CPT | Performed by: ORTHOPAEDIC SURGERY

## 2019-04-22 PROCEDURE — 71046 X-RAY EXAM CHEST 2 VIEWS: CPT | Performed by: INTERNAL MEDICINE

## 2019-04-22 PROCEDURE — 93010 ELECTROCARDIOGRAM REPORT: CPT | Performed by: INTERNAL MEDICINE

## 2019-04-22 PROCEDURE — 93005 ELECTROCARDIOGRAM TRACING: CPT

## 2019-04-22 PROCEDURE — 99214 OFFICE O/P EST MOD 30 MIN: CPT | Performed by: INTERNAL MEDICINE

## 2019-04-22 RX ORDER — AMOXICILLIN AND CLAVULANATE POTASSIUM 875; 125 MG/1; MG/1
1 TABLET, FILM COATED ORAL 2 TIMES DAILY WITH MEALS
Qty: 14 TABLET | Refills: 0 | Status: SHIPPED | OUTPATIENT
Start: 2019-04-22 | End: 2019-04-29

## 2019-04-22 NOTE — PATIENT INSTRUCTIONS
Await results of chest x-ray and EKG. Please apply heat 2-3 times daily and take Advil or Aleve as needed. Please take Augmentin twice daily with meals for 7 days. Call if no better.

## 2019-04-22 NOTE — TELEPHONE ENCOUNTER
Action Requested: Summary for Provider     []  Critical Lab, Recommendations Needed  [] Need Additional Advice  [x]   FYI    []   Need Orders  [] Need Medications Sent to Pharmacy  []  Other     SUMMARY: Appt scheduled today 1:40pm with Dr MURPHY, for concerni

## 2019-04-22 NOTE — PROGRESS NOTES
Tamie Bruno is a 62year old female. Patient presents with:  Chest Pain: pt stts she thinks she pulled muscle on chest. pt stts she has pain in the middle of chest for about a month.    Cough    HPI:   For approximately 1-2 months she has had constant mid Disp: 180 tablet Rfl: 0   lamoTRIgine 200 MG Oral Tab TK ONE T PO QHS Disp:  Rfl: 0   ClonazePAM 2 MG Oral Tab Take 1 tablet 3 x a day Disp:  Rfl:      No Known Allergies   Past Medical History:   Diagnosis Date   • Back pain     medical management of pain Amoxicillin-Pot Clavulanate 875-125 MG Oral Tab; Take 1 tablet by mouth 2 (two) times daily with meals for 7 days. Dispense: 14 tablet; Refill: 0    The patient indicates understanding of these issues and agrees to the plan.     Jessica Cruz MD  4/22/201

## 2019-04-23 ENCOUNTER — TELEPHONE (OUTPATIENT)
Dept: INTERNAL MEDICINE CLINIC | Facility: CLINIC | Age: 59
End: 2019-04-23

## 2019-04-23 ENCOUNTER — LAB ENCOUNTER (OUTPATIENT)
Dept: LAB | Facility: HOSPITAL | Age: 59
End: 2019-04-23
Attending: INTERNAL MEDICINE
Payer: MEDICAID

## 2019-04-23 DIAGNOSIS — K59.09 OTHER CONSTIPATION: ICD-10-CM

## 2019-04-23 DIAGNOSIS — R53.83 OTHER FATIGUE: ICD-10-CM

## 2019-04-23 DIAGNOSIS — E55.9 VITAMIN D DEFICIENCY: Primary | ICD-10-CM

## 2019-04-23 DIAGNOSIS — E55.9 VITAMIN D DEFICIENCY: ICD-10-CM

## 2019-04-23 PROCEDURE — 84443 ASSAY THYROID STIM HORMONE: CPT

## 2019-04-23 PROCEDURE — 85025 COMPLETE CBC W/AUTO DIFF WBC: CPT

## 2019-04-23 PROCEDURE — 36415 COLL VENOUS BLD VENIPUNCTURE: CPT

## 2019-04-23 PROCEDURE — 80053 COMPREHEN METABOLIC PANEL: CPT

## 2019-04-23 PROCEDURE — 82306 VITAMIN D 25 HYDROXY: CPT

## 2019-04-23 NOTE — TELEPHONE ENCOUNTER
Patient is asking for her 6 month labs to be completed. She has a ride today to have completed. I pended the the lipid and Vitamin D. Any others? The patient would like a call back as soon as possible. She did eat toast this morning.     Per last lab

## 2019-04-24 ENCOUNTER — APPOINTMENT (OUTPATIENT)
Dept: LAB | Facility: HOSPITAL | Age: 59
End: 2019-04-24
Attending: INTERNAL MEDICINE
Payer: MEDICAID

## 2019-04-24 DIAGNOSIS — K59.09 OTHER CONSTIPATION: ICD-10-CM

## 2019-04-24 DIAGNOSIS — E55.9 VITAMIN D DEFICIENCY: ICD-10-CM

## 2019-04-24 DIAGNOSIS — R53.83 OTHER FATIGUE: ICD-10-CM

## 2019-04-24 PROCEDURE — 36415 COLL VENOUS BLD VENIPUNCTURE: CPT

## 2019-04-24 PROCEDURE — 80061 LIPID PANEL: CPT

## 2019-04-24 NOTE — H&P
[id number] - Gastroenterology                                                                                                  Clinic History and Physical SURGICAL HISTORY  2010    cancer mole removed   • OTHER SURGICAL HISTORY  2013    removed ovarires      Family Hx:   Family History   Problem Relation Age of Onset   • Cancer Father 28        lung ca   • Breast Cancer Maternal Grandmother 50        d. [address] (four) hours as needed for Pain.  Disp: 180 tablet Rfl: 0   lamoTRIgine 200 MG Oral Tab TK ONE T PO QHS Disp:  Rfl: 0   ClonazePAM 2 MG Oral Tab Take 1 tablet 3 x a day Disp:  Rfl:      Allergies:  No Known Allergies    ROS:   all 10 systems were reviewed a symptoms. Dr. Alisia Knott assessed the patient as having chronic GERD symptoms and thought to have contributing factors including dental work, possibly h.pylori, hiatal hernia.  She advised small frequent meals, discontinuing her caffeine and repeating testing for considered but likely not to be covered by her insurance. She also may benefit from pelvic floor physical therapy. At this time, I also advised she stop lactulose which certainly can help with constipation but may also exacerbate symptoms in the process.

## 2019-04-25 ENCOUNTER — OFFICE VISIT (OUTPATIENT)
Dept: GASTROENTEROLOGY | Facility: CLINIC | Age: 59
End: 2019-04-25
Payer: MEDICAID

## 2019-04-25 VITALS
HEART RATE: 51 BPM | SYSTOLIC BLOOD PRESSURE: 108 MMHG | BODY MASS INDEX: 28.35 KG/M2 | WEIGHT: 160 LBS | HEIGHT: 63 IN | DIASTOLIC BLOOD PRESSURE: 70 MMHG

## 2019-04-25 DIAGNOSIS — R14.0 BLOATING: ICD-10-CM

## 2019-04-25 DIAGNOSIS — R10.9 ABDOMINAL DISCOMFORT: ICD-10-CM

## 2019-04-25 DIAGNOSIS — K59.00 CONSTIPATION, UNSPECIFIED CONSTIPATION TYPE: Primary | ICD-10-CM

## 2019-04-25 PROCEDURE — 99203 OFFICE O/P NEW LOW 30 MIN: CPT | Performed by: INTERNAL MEDICINE

## 2019-04-25 RX ORDER — POLYETHYLENE GLYCOL 3350, SODIUM CHLORIDE, SODIUM BICARBONATE, POTASSIUM CHLORIDE 420; 11.2; 5.72; 1.48 G/4L; G/4L; G/4L; G/4L
POWDER, FOR SOLUTION ORAL
Qty: 1 BOTTLE | Refills: 0 | Status: SHIPPED | OUTPATIENT
Start: 2019-04-25 | End: 2019-08-16

## 2019-04-25 NOTE — TELEPHONE ENCOUNTER
OV note from 4/25/19 faxed to Cory Hernandez, patient advocate, at [employer] at [phone]. Fax confirmation received 4/25/19 @ 3896.

## 2019-04-25 NOTE — PATIENT INSTRUCTIONS
WASHOUT INSTRUCTIONS:  1. Pick a day you will be at home, close to a toilet. 2. Have a light breakfast that AM: small amount of eggs or 1 slice of toast  3.  Around 10AM start drinking the solution prescribed (Trilyte/colyte/golytely), drink over the cours

## 2019-04-26 ENCOUNTER — TELEPHONE (OUTPATIENT)
Dept: NEUROLOGY | Facility: CLINIC | Age: 59
End: 2019-04-26

## 2019-05-17 ENCOUNTER — TELEPHONE (OUTPATIENT)
Dept: INTERNAL MEDICINE CLINIC | Facility: CLINIC | Age: 59
End: 2019-05-17

## 2019-05-17 ENCOUNTER — OFFICE VISIT (OUTPATIENT)
Dept: INTERNAL MEDICINE CLINIC | Facility: CLINIC | Age: 59
End: 2019-05-17
Payer: MEDICAID

## 2019-05-17 VITALS
RESPIRATION RATE: 18 BRPM | BODY MASS INDEX: 29 KG/M2 | DIASTOLIC BLOOD PRESSURE: 73 MMHG | WEIGHT: 165 LBS | SYSTOLIC BLOOD PRESSURE: 110 MMHG | HEART RATE: 70 BPM

## 2019-05-17 DIAGNOSIS — E55.9 VITAMIN D DEFICIENCY: ICD-10-CM

## 2019-05-17 DIAGNOSIS — M47.816 LUMBAR SPONDYLOSIS: Primary | ICD-10-CM

## 2019-05-17 PROCEDURE — 99212 OFFICE O/P EST SF 10 MIN: CPT | Performed by: INTERNAL MEDICINE

## 2019-05-17 PROCEDURE — 99213 OFFICE O/P EST LOW 20 MIN: CPT | Performed by: INTERNAL MEDICINE

## 2019-05-17 RX ORDER — HYDROCODONE BITARTRATE AND ACETAMINOPHEN 10; 325 MG/1; MG/1
1 TABLET ORAL EVERY 4 HOURS PRN
Qty: 180 TABLET | Refills: 0 | Status: SHIPPED | OUTPATIENT
Start: 2019-07-18 | End: 2019-08-17

## 2019-05-17 RX ORDER — ERGOCALCIFEROL 1.25 MG/1
50000 CAPSULE ORAL WEEKLY
Qty: 4 CAPSULE | Refills: 5 | Status: SHIPPED | OUTPATIENT
Start: 2019-05-17 | End: 2019-12-31

## 2019-05-17 RX ORDER — HYDROCODONE BITARTRATE AND ACETAMINOPHEN 10; 325 MG/1; MG/1
1 TABLET ORAL EVERY 4 HOURS PRN
Qty: 180 TABLET | Refills: 0 | Status: SHIPPED | OUTPATIENT
Start: 2019-05-17 | End: 2019-06-16

## 2019-05-17 RX ORDER — HYDROCODONE BITARTRATE AND ACETAMINOPHEN 10; 325 MG/1; MG/1
1 TABLET ORAL EVERY 4 HOURS PRN
Qty: 180 TABLET | Refills: 0 | Status: SHIPPED | OUTPATIENT
Start: 2019-06-17 | End: 2019-07-17

## 2019-05-17 NOTE — TELEPHONE ENCOUNTER
Please see pt request for early refill. Pharmacy has refill available, but won't refill as pt is asking for refill too early.

## 2019-05-17 NOTE — TELEPHONE ENCOUNTER
Louis Barba from [facility] called and she would like to know if can fill the medication today.   Wants to know if she can release today because based on last script she should have enough until 5/26    HYDROcodone-acetaminophen  MG Oral Tab Take 1 tablet by

## 2019-05-19 NOTE — PROGRESS NOTES
HPI:    Patient ID: Chely Villarreal is a 61year old female.   Presents for follow-up on chronic pain, concern of difficulty eating solid foods  HPI  Patient continues to struggle with eating solid food, dentures which were provided couple years ago and not fit HYDROcodone-acetaminophen  MG Oral Tab Take 1 tablet by mouth every 4 (four) hours as needed for Pain.  Disp: 180 tablet Rfl: 0   [START ON 6/17/2019] HYDROcodone-acetaminophen  MG Oral Tab Take 1 tablet by mouth every 4 (four) hours as needed behavior is normal. Judgment normal.            ASSESSMENT/PLAN:   Lumbar spondylosis  (primary encounter diagnosis) controlled with the pain medication, refill Norco up to 6 tablets a day 3 months prescription provided, follow-up in 3 months  Vitamin d de

## 2019-06-21 ENCOUNTER — TELEPHONE (OUTPATIENT)
Dept: SCHEDULING | Age: 59
End: 2019-06-21

## 2019-06-27 ENCOUNTER — TELEPHONE (OUTPATIENT)
Dept: SCHEDULING | Age: 59
End: 2019-06-27

## 2019-07-02 ENCOUNTER — TELEPHONE (OUTPATIENT)
Dept: SCHEDULING | Age: 59
End: 2019-07-02

## 2019-07-05 ENCOUNTER — APPOINTMENT (OUTPATIENT)
Dept: INTERNAL MEDICINE | Age: 59
End: 2019-07-05

## 2019-07-11 ENCOUNTER — APPOINTMENT (OUTPATIENT)
Dept: INTERNAL MEDICINE | Age: 59
End: 2019-07-11

## 2019-08-12 ENCOUNTER — TELEPHONE (OUTPATIENT)
Dept: INTERNAL MEDICINE CLINIC | Facility: CLINIC | Age: 59
End: 2019-08-12

## 2019-08-12 NOTE — TELEPHONE ENCOUNTER
Pt needs to see you for refill on meds. Pt can only come on Fridays because she does not have a car. Can we use your [facility] slot for this Friday 8/16 at 2:40PM?    Please let us know.    Thank you

## 2019-08-16 ENCOUNTER — OFFICE VISIT (OUTPATIENT)
Dept: INTERNAL MEDICINE CLINIC | Facility: CLINIC | Age: 59
End: 2019-08-16
Payer: MEDICAID

## 2019-08-16 VITALS
SYSTOLIC BLOOD PRESSURE: 109 MMHG | BODY MASS INDEX: 27 KG/M2 | DIASTOLIC BLOOD PRESSURE: 76 MMHG | HEART RATE: 120 BPM | WEIGHT: 155 LBS | RESPIRATION RATE: 26 BRPM

## 2019-08-16 DIAGNOSIS — M47.816 LUMBAR SPONDYLOSIS: Primary | ICD-10-CM

## 2019-08-16 PROCEDURE — 99213 OFFICE O/P EST LOW 20 MIN: CPT | Performed by: INTERNAL MEDICINE

## 2019-08-16 RX ORDER — LURASIDONE HYDROCHLORIDE 60 MG/1
TABLET, FILM COATED ORAL
Refills: 2 | COMMUNITY
Start: 2019-06-27

## 2019-08-16 RX ORDER — HYDROCODONE BITARTRATE AND ACETAMINOPHEN 10; 325 MG/1; MG/1
1 TABLET ORAL EVERY 4 HOURS PRN
Qty: 180 TABLET | Refills: 0 | Status: SHIPPED | OUTPATIENT
Start: 2019-10-17 | End: 2019-11-13

## 2019-08-16 RX ORDER — HYDROCODONE BITARTRATE AND ACETAMINOPHEN 10; 325 MG/1; MG/1
1 TABLET ORAL EVERY 4 HOURS PRN
Qty: 80 TABLET | Refills: 0 | Status: SHIPPED | OUTPATIENT
Start: 2019-10-17 | End: 2019-08-16

## 2019-08-16 RX ORDER — HYDROCODONE BITARTRATE AND ACETAMINOPHEN 10; 325 MG/1; MG/1
1 TABLET ORAL EVERY 4 HOURS PRN
Qty: 180 TABLET | Refills: 0 | Status: SHIPPED | OUTPATIENT
Start: 2019-09-16 | End: 2019-10-16

## 2019-08-16 RX ORDER — HYDROCODONE BITARTRATE AND ACETAMINOPHEN 10; 325 MG/1; MG/1
1 TABLET ORAL EVERY 4 HOURS PRN
Qty: 180 TABLET | Refills: 0 | Status: SHIPPED | OUTPATIENT
Start: 2019-08-16 | End: 2019-09-15

## 2019-08-17 NOTE — PROGRESS NOTES
HPI:    Patient ID: Leo Brown is a 61year old female. Presents for follow-up on chronic pain    HPI patient has  Patient has been taking Norco up to 6 tablets a day without medication she is not able to function.   She states that she has been taking le every 4 (four) hours as needed for Pain.  Disp: 180 tablet Rfl: 0     Allergies:No Known Allergies   /76 (BP Location: Left arm, Patient Position: Sitting, Cuff Size: large)   Pulse 120   Resp 26   Wt 155 lb (70.3 kg)   BMI 27.46 kg/m²    Physical Exa

## 2019-11-13 ENCOUNTER — LAB ENCOUNTER (OUTPATIENT)
Dept: LAB | Age: 59
End: 2019-11-13
Attending: INTERNAL MEDICINE
Payer: MEDICAID

## 2019-11-13 ENCOUNTER — OFFICE VISIT (OUTPATIENT)
Dept: INTERNAL MEDICINE CLINIC | Facility: CLINIC | Age: 59
End: 2019-11-13
Payer: MEDICAID

## 2019-11-13 VITALS
SYSTOLIC BLOOD PRESSURE: 116 MMHG | HEIGHT: 63 IN | BODY MASS INDEX: 26.22 KG/M2 | RESPIRATION RATE: 18 BRPM | DIASTOLIC BLOOD PRESSURE: 72 MMHG | TEMPERATURE: 97 F | HEART RATE: 56 BPM | WEIGHT: 148 LBS

## 2019-11-13 DIAGNOSIS — M47.816 LUMBAR SPONDYLOSIS: ICD-10-CM

## 2019-11-13 DIAGNOSIS — E55.9 VITAMIN D DEFICIENCY: ICD-10-CM

## 2019-11-13 DIAGNOSIS — Z00.00 PHYSICAL EXAM, ANNUAL: Primary | ICD-10-CM

## 2019-11-13 DIAGNOSIS — Z12.31 BREAST CANCER SCREENING BY MAMMOGRAM: ICD-10-CM

## 2019-11-13 DIAGNOSIS — Z00.00 PHYSICAL EXAM, ANNUAL: ICD-10-CM

## 2019-11-13 DIAGNOSIS — R92.2 DENSE BREAST TISSUE ON MAMMOGRAM: ICD-10-CM

## 2019-11-13 PROCEDURE — 84443 ASSAY THYROID STIM HORMONE: CPT

## 2019-11-13 PROCEDURE — 80053 COMPREHEN METABOLIC PANEL: CPT

## 2019-11-13 PROCEDURE — 82306 VITAMIN D 25 HYDROXY: CPT

## 2019-11-13 PROCEDURE — 80061 LIPID PANEL: CPT

## 2019-11-13 PROCEDURE — 84439 ASSAY OF FREE THYROXINE: CPT

## 2019-11-13 PROCEDURE — 99396 PREV VISIT EST AGE 40-64: CPT | Performed by: INTERNAL MEDICINE

## 2019-11-13 PROCEDURE — 36415 COLL VENOUS BLD VENIPUNCTURE: CPT

## 2019-11-13 PROCEDURE — 85025 COMPLETE CBC W/AUTO DIFF WBC: CPT

## 2019-11-13 RX ORDER — HYDROCODONE BITARTRATE AND ACETAMINOPHEN 10; 325 MG/1; MG/1
1 TABLET ORAL EVERY 4 HOURS PRN
Qty: 30 TABLET | Refills: 0 | Status: SHIPPED | OUTPATIENT
Start: 2019-11-22 | End: 2019-11-13

## 2019-11-13 RX ORDER — HYDROCODONE BITARTRATE AND ACETAMINOPHEN 10; 325 MG/1; MG/1
1 TABLET ORAL EVERY 4 HOURS PRN
Qty: 180 TABLET | Refills: 0 | Status: SHIPPED | OUTPATIENT
Start: 2019-12-20 | End: 2020-01-15

## 2019-11-13 RX ORDER — HYDROCODONE BITARTRATE AND ACETAMINOPHEN 10; 325 MG/1; MG/1
1 TABLET ORAL EVERY 4 HOURS PRN
Qty: 30 TABLET | Refills: 0 | Status: SHIPPED | OUTPATIENT
Start: 2020-01-18 | End: 2019-11-13

## 2019-11-13 RX ORDER — HYDROCODONE BITARTRATE AND ACETAMINOPHEN 10; 325 MG/1; MG/1
1 TABLET ORAL EVERY 4 HOURS PRN
Qty: 30 TABLET | Refills: 0 | Status: SHIPPED | OUTPATIENT
Start: 2019-12-20 | End: 2019-11-13

## 2019-11-13 RX ORDER — HYDROCODONE BITARTRATE AND ACETAMINOPHEN 10; 325 MG/1; MG/1
1 TABLET ORAL EVERY 4 HOURS PRN
Qty: 180 TABLET | Refills: 0 | Status: SHIPPED | OUTPATIENT
Start: 2019-11-22 | End: 2019-12-22

## 2019-11-13 RX ORDER — HYDROCODONE BITARTRATE AND ACETAMINOPHEN 10; 325 MG/1; MG/1
1 TABLET ORAL EVERY 4 HOURS PRN
Qty: 180 TABLET | Refills: 0 | Status: SHIPPED | OUTPATIENT
Start: 2020-01-18 | End: 2020-02-17

## 2019-11-15 NOTE — PROGRESS NOTES
HPI:    Patient ID: Liza Frey is a 61year old female.   Presents for physical exam    HPI  Patient reports that main problem she has its absence of the teeth, she needs to eat liquid food drinking Ensure states that it extremely difficult for her she is TK 1 T PO QHS  2   • ergocalciferol 84195 units Oral Cap Take 1 capsule (50,000 Units total) by mouth once a week. 4 capsule 5   • HYDROcodone-acetaminophen (NORCO)  MG Oral Tab Take 1 tablet by mouth every 4 (four) hours as needed for Pain.  180 tabl low back pain refilled [place] for 3 months  Orders Placed This Encounter      CBC W Differential W Platelet [E]      Comp Metabolic Panel (14) [E]      Lipid Panel [E]      TSH W Reflex To Free T4 [E]      Vitamin D, 25-Hydroxy [E]  Follow-up in 3 months

## 2019-11-16 RX ORDER — ERGOCALCIFEROL (VITAMIN D2) 1250 MCG
50000 CAPSULE ORAL WEEKLY
Qty: 4 CAPSULE | Refills: 5 | Status: SHIPPED | OUTPATIENT
Start: 2019-11-16

## 2019-12-31 ENCOUNTER — LAB ENCOUNTER (OUTPATIENT)
Dept: LAB | Age: 59
End: 2019-12-31
Attending: INTERNAL MEDICINE
Payer: MEDICAID

## 2019-12-31 ENCOUNTER — OFFICE VISIT (OUTPATIENT)
Dept: INTERNAL MEDICINE CLINIC | Facility: CLINIC | Age: 59
End: 2019-12-31
Payer: MEDICAID

## 2019-12-31 VITALS
SYSTOLIC BLOOD PRESSURE: 107 MMHG | WEIGHT: 157 LBS | TEMPERATURE: 97 F | BODY MASS INDEX: 28 KG/M2 | RESPIRATION RATE: 16 BRPM | DIASTOLIC BLOOD PRESSURE: 68 MMHG | HEART RATE: 60 BPM

## 2019-12-31 DIAGNOSIS — Z01.818 PREOP EXAMINATION: ICD-10-CM

## 2019-12-31 DIAGNOSIS — J34.2 ACQUIRED DEVIATED NASAL SEPTUM: ICD-10-CM

## 2019-12-31 DIAGNOSIS — Z01.818 PREOP EXAMINATION: Primary | ICD-10-CM

## 2019-12-31 LAB
ANION GAP SERPL CALC-SCNC: 3 MMOL/L (ref 0–18)
BASOPHILS # BLD AUTO: 0.05 X10(3) UL (ref 0–0.2)
BASOPHILS NFR BLD AUTO: 0.7 %
BUN BLD-MCNC: 13 MG/DL (ref 7–18)
BUN/CREAT SERPL: 14.4 (ref 10–20)
CALCIUM BLD-MCNC: 9.2 MG/DL (ref 8.5–10.1)
CHLORIDE SERPL-SCNC: 110 MMOL/L (ref 98–112)
CO2 SERPL-SCNC: 29 MMOL/L (ref 21–32)
CREAT BLD-MCNC: 0.9 MG/DL (ref 0.55–1.02)
DEPRECATED RDW RBC AUTO: 44.9 FL (ref 35.1–46.3)
EOSINOPHIL # BLD AUTO: 0.08 X10(3) UL (ref 0–0.7)
EOSINOPHIL NFR BLD AUTO: 1.2 %
ERYTHROCYTE [DISTWIDTH] IN BLOOD BY AUTOMATED COUNT: 13.2 % (ref 11–15)
GLUCOSE BLD-MCNC: 95 MG/DL (ref 70–99)
HCT VFR BLD AUTO: 41.3 % (ref 35–48)
HGB BLD-MCNC: 12.9 G/DL (ref 12–16)
IMM GRANULOCYTES # BLD AUTO: 0.02 X10(3) UL (ref 0–1)
IMM GRANULOCYTES NFR BLD: 0.3 %
LYMPHOCYTES # BLD AUTO: 1.68 X10(3) UL (ref 1–4)
LYMPHOCYTES NFR BLD AUTO: 24.9 %
MCH RBC QN AUTO: 29 PG (ref 26–34)
MCHC RBC AUTO-ENTMCNC: 31.2 G/DL (ref 31–37)
MCV RBC AUTO: 92.8 FL (ref 80–100)
MONOCYTES # BLD AUTO: 0.37 X10(3) UL (ref 0.1–1)
MONOCYTES NFR BLD AUTO: 5.5 %
NEUTROPHILS # BLD AUTO: 4.54 X10 (3) UL (ref 1.5–7.7)
NEUTROPHILS # BLD AUTO: 4.54 X10(3) UL (ref 1.5–7.7)
NEUTROPHILS NFR BLD AUTO: 67.4 %
OSMOLALITY SERPL CALC.SUM OF ELEC: 294 MOSM/KG (ref 275–295)
PATIENT FASTING Y/N/NP: YES
PLATELET # BLD AUTO: 312 10(3)UL (ref 150–450)
POTASSIUM SERPL-SCNC: 4.9 MMOL/L (ref 3.5–5.1)
RBC # BLD AUTO: 4.45 X10(6)UL (ref 3.8–5.3)
SODIUM SERPL-SCNC: 142 MMOL/L (ref 136–145)
WBC # BLD AUTO: 6.7 X10(3) UL (ref 4–11)

## 2019-12-31 PROCEDURE — 36415 COLL VENOUS BLD VENIPUNCTURE: CPT

## 2019-12-31 PROCEDURE — 85025 COMPLETE CBC W/AUTO DIFF WBC: CPT

## 2019-12-31 PROCEDURE — 93005 ELECTROCARDIOGRAM TRACING: CPT

## 2019-12-31 PROCEDURE — 99214 OFFICE O/P EST MOD 30 MIN: CPT | Performed by: INTERNAL MEDICINE

## 2019-12-31 PROCEDURE — 93010 ELECTROCARDIOGRAM REPORT: CPT | Performed by: INTERNAL MEDICINE

## 2019-12-31 PROCEDURE — 80048 BASIC METABOLIC PNL TOTAL CA: CPT

## 2020-01-02 ENCOUNTER — TELEPHONE (OUTPATIENT)
Dept: INTERNAL MEDICINE CLINIC | Facility: CLINIC | Age: 60
End: 2020-01-02

## 2020-01-02 NOTE — TELEPHONE ENCOUNTER
Please call [facility] plastic surgeon office 1 more time speak to clinical person/surgery scheduler or his  assitant make sure that patient does not go with surgery on January 9, 2020, as she is being treated at [facility] after being unre

## 2020-01-02 NOTE — TELEPHONE ENCOUNTER
Talked with Blair at Dr. Michelle Green office. She will relay the message to Dr. Levy Cevallos. He is currently on vacation. Patient will be removed from the surgery schedule.

## 2020-01-02 NOTE — TELEPHONE ENCOUNTER
MARK for NK. Called pt. Informed that pt.`s medical clearance were faxed to DR. Velez`s office. During conversation with pt. I noticed patients sounds confused, I asked pt. Where she is at she said \"I am at home. \"  I looked at pt. chart while talking to

## 2020-01-03 NOTE — TELEPHONE ENCOUNTER
Called pt. Left message, scheduled surgery were cancelled, `s office notified. Instructed pt. to call back and make a f/u appt. And need to be cleared again for surgery. Faxed cancellation of surgery to Dr. Kyung Schmid office , received confirmation.

## 2020-01-05 ENCOUNTER — TELEPHONE (OUTPATIENT)
Dept: INTERNAL MEDICINE CLINIC | Facility: CLINIC | Age: 60
End: 2020-01-05

## 2020-01-06 ENCOUNTER — NURSE TRIAGE (OUTPATIENT)
Dept: INTERNAL MEDICINE CLINIC | Facility: CLINIC | Age: 60
End: 2020-01-06

## 2020-01-06 NOTE — TELEPHONE ENCOUNTER
Dr Kadie Eon called, follow-up on the patient. Discussed the [facility] ER notes with him. He will cancel the surgery for the patient and advised we follow-up with the patient. Agreed and will generate an Acute TE encounter for ER f/u.

## 2020-01-06 NOTE — TELEPHONE ENCOUNTER
LMTCB transfer to triage--check to see how she's doing. Dr Olive Altamirano, on call for Dr Luis Enrique Miles, [place] on this. Dr Dexter Bloch, ENT, called to check on her (see TE surgical clearance 1/2/2020).     Please reply to pool: EM CALL CENTER--please schedule to see Internal Medi

## 2020-01-07 ENCOUNTER — TELEPHONE (OUTPATIENT)
Dept: OTHER | Age: 60
End: 2020-01-07

## 2020-01-07 NOTE — TELEPHONE ENCOUNTER
Patient calling very upset that Dr. Kadie Dale was called that surgical clearance was cancelled. That patient was unresponsive in [facility] ER. Patient yelling where did we get that information? Patient states she was not at [facility] and was not unresponsive.   Stat

## 2020-01-07 NOTE — TELEPHONE ENCOUNTER
Noted, pt  Does need  Appointment for follow up after  tx  At [facility]

## 2020-01-08 NOTE — TELEPHONE ENCOUNTER
Patient was contacted to make appointment for ER follow up. Patient refused to make appointment she states she has the flu, dry cough, achy, chills, sneezing, runny nose headache and feeling hot and cold.  Advised patient that she needs to be seen as soon

## 2020-01-08 NOTE — TELEPHONE ENCOUNTER
Noted ,  Please to schedule appointment for Dr. Marina Marcus as soon as possible - Monday -been Dr. Marina Marcus is back to the office January 13    If any persistent ,worsening symptoms patient should be seen immediately by any available provider or go to ER-

## 2020-01-10 NOTE — TELEPHONE ENCOUNTER
Spoke with patient, she will call on Monday to make an appointment    She will not go anywhere \" while I'm sick \"    Thanking us for the concern .     Routing as an FYI

## 2020-01-13 ENCOUNTER — TELEPHONE (OUTPATIENT)
Dept: INTERNAL MEDICINE CLINIC | Facility: CLINIC | Age: 60
End: 2020-01-13

## 2020-01-13 NOTE — TELEPHONE ENCOUNTER
Pt states that she would like to see Dr. Timmy Reynaga this week on Wednesday or after for pre op clearance. There are no available appointments.      Please reply to pool: ALESSANDRO Raymundo

## 2020-01-13 NOTE — TELEPHONE ENCOUNTER
cannot see pt this  Week, give pt  appt for next week , she can try to see other  Physicians  At the clinic who have  Availability this week

## 2020-01-13 NOTE — TELEPHONE ENCOUNTER
Welcome back this patient is very persistent she states she had to cancel her surgery for rhino surgery for her nose she states it is paid for once and she need to see you next week what day is good she don't want to see another Doctor res appt  what day s

## 2020-01-14 ENCOUNTER — TELEPHONE (OUTPATIENT)
Dept: CARDIOLOGY CLINIC | Facility: CLINIC | Age: 60
End: 2020-01-14

## 2020-01-14 ENCOUNTER — OFFICE VISIT (OUTPATIENT)
Dept: CARDIOLOGY CLINIC | Facility: CLINIC | Age: 60
End: 2020-01-14
Payer: MEDICAID

## 2020-01-14 VITALS
SYSTOLIC BLOOD PRESSURE: 106 MMHG | RESPIRATION RATE: 18 BRPM | HEIGHT: 63 IN | WEIGHT: 160 LBS | DIASTOLIC BLOOD PRESSURE: 74 MMHG | HEART RATE: 64 BPM | BODY MASS INDEX: 28.35 KG/M2

## 2020-01-14 DIAGNOSIS — Z01.810 ENCOUNTER FOR PRE-OPERATIVE CARDIOVASCULAR CLEARANCE: Primary | ICD-10-CM

## 2020-01-14 PROCEDURE — 99204 OFFICE O/P NEW MOD 45 MIN: CPT | Performed by: NURSE PRACTITIONER

## 2020-01-14 PROCEDURE — 93000 ELECTROCARDIOGRAM COMPLETE: CPT | Performed by: NURSE PRACTITIONER

## 2020-01-14 NOTE — PROGRESS NOTES
Vera Uribe is a 61year old female. Patient presents with:  Consult  Surgical/procedure Clearance    HPI:   Patient comes in today for consultation. She sees Dr. Sara Aiken she is here for surgical clearance for rhinoplasty.   This surgery was scheduled but was Depression    • Diverticulosis    • Gastritis    • GERD (gastroesophageal reflux disease)    • Headache 2012   • Hiatal hernia    • History of Helicobacter pylori infection 10/6/2015      Social History:  Social History    Tobacco Use      Smoking status: issues and agrees to the plan. The patient is asked to return in as needed.       [facility], ZAK  1/14/2020  3:49 PM

## 2020-01-14 NOTE — TELEPHONE ENCOUNTER
Spoke to patient, she does not have recollection of events she is not aware that she was at the hospital at [facility], I advised her that she was found unresponsive in pretty bad shape.   Advised that surgery under general anesthesia as it requires at least 3

## 2020-01-14 NOTE — TELEPHONE ENCOUNTER
Patient requesting a copy of her EKG results be sent to home address:    [place], [address] [place] [address]    For additional questions please call patient. Thank you.

## 2020-01-15 ENCOUNTER — TELEPHONE (OUTPATIENT)
Dept: INTERNAL MEDICINE CLINIC | Facility: CLINIC | Age: 60
End: 2020-01-15

## 2020-01-15 ENCOUNTER — HOSPITAL ENCOUNTER (OUTPATIENT)
Dept: CV DIAGNOSTICS | Facility: HOSPITAL | Age: 60
Discharge: HOME | End: 2020-01-15
Attending: NURSE PRACTITIONER
Payer: MEDICAID

## 2020-01-15 ENCOUNTER — OFFICE VISIT (OUTPATIENT)
Dept: NEUROLOGY | Facility: CLINIC | Age: 60
End: 2020-01-15
Payer: MEDICAID

## 2020-01-15 VITALS
HEART RATE: 64 BPM | DIASTOLIC BLOOD PRESSURE: 74 MMHG | SYSTOLIC BLOOD PRESSURE: 112 MMHG | WEIGHT: 160 LBS | HEIGHT: 63 IN | BODY MASS INDEX: 28.35 KG/M2

## 2020-01-15 DIAGNOSIS — Z01.810 ENCOUNTER FOR PRE-OPERATIVE CARDIOVASCULAR CLEARANCE: ICD-10-CM

## 2020-01-15 DIAGNOSIS — IMO0002 CHRONIC MIGRAINE: Primary | ICD-10-CM

## 2020-01-15 PROCEDURE — 93306 TTE W/DOPPLER COMPLETE: CPT | Performed by: NURSE PRACTITIONER

## 2020-01-15 PROCEDURE — 99214 OFFICE O/P EST MOD 30 MIN: CPT | Performed by: OTHER

## 2020-01-15 RX ORDER — TOPIRAMATE 100 MG/1
150 TABLET, FILM COATED ORAL 2 TIMES DAILY
Qty: 270 TABLET | Refills: 3 | Status: SHIPPED | OUTPATIENT
Start: 2020-01-15

## 2020-01-15 NOTE — TELEPHONE ENCOUNTER
EKG results currently in process from yesterday. Melida please advise if OK to release to patient. Thank you.

## 2020-01-15 NOTE — PROGRESS NOTES
Neurology follow-up visit     Referred By: Dr. Burns ref. provider found    Chief Complaint: Patient presents with:  Headache: LOV: 2/13/19. F/U on migraines. Patient states that her migraines have been pretty well controlled.  She states that currently she months into it. Still though she was not able to provide any exact frequency before and after of the headaches.     Past Medical History:   Diagnosis Date   • Back pain     medical management of pain   • Constipation    • Depression    • Diverticulosis Oral Cap, Take 1 capsule (50,000 Units total) by mouth once a week., Disp: 4 capsule, Rfl: 5  •  [START ON 1/18/2020] HYDROcodone-acetaminophen  MG Oral Tab, Take 1 tablet by mouth every 4 (four) hours as needed for Pain., Disp: 180 tablet, Rfl: 0  • 11/13/2019    AST 15 11/13/2019    ALKPHOS 54 07/15/2016    ALB 4.1 11/13/2019     12/31/2019    K 4.9 12/31/2019     12/31/2019    CO2 29.0 12/31/2019      I have reviewed labs. Assessment   1.  Chronic migraine  Patient with a chronic noemí

## 2020-01-16 ENCOUNTER — TELEPHONE (OUTPATIENT)
Dept: CARDIOLOGY CLINIC | Facility: CLINIC | Age: 60
End: 2020-01-16

## 2020-01-16 DIAGNOSIS — R55 SYNCOPE, UNSPECIFIED SYNCOPE TYPE: Primary | ICD-10-CM

## 2020-01-16 NOTE — TELEPHONE ENCOUNTER
Please see [facility]  Records  Available for  Review pt  Was  Admitted  Being  Unresponsive, no clear reason?for  event     Refused   Holter?   Or  Event  Monitor on discharge, surgery planned  3 hour plastic surgery

## 2020-01-16 NOTE — TELEPHONE ENCOUNTER
ZAK Hall  P Em Cardio Clinical Staff             Echo appears normal, with her episode of nonresponsiveness recommend holter monitor 24 hr soon      Discussed with pt, order placed. Pt hesitant to do but finally agrees.  Scheduling number g

## 2020-01-16 NOTE — TELEPHONE ENCOUNTER
Please review recent admission to [facility] being unresponsive, that is why I asked patient to see you, I need to clear her for plastic surgery which requires 3 hours of anesthesia, patient was unresponsive no clear reason for that episode, I d

## 2020-01-16 NOTE — TELEPHONE ENCOUNTER
Reviewed records, will do holter monitor. Echo was done today that looks stable. Fortunately she has no risk factor for heart disease and and no cardiac symptoms.  Wll decide on surgery after monitor

## 2020-01-18 ENCOUNTER — HOSPITAL ENCOUNTER (OUTPATIENT)
Dept: CV DIAGNOSTICS | Facility: HOSPITAL | Age: 60
Discharge: HOME | End: 2020-01-18
Attending: NURSE PRACTITIONER
Payer: MEDICAID

## 2020-01-18 DIAGNOSIS — R55 SYNCOPE, UNSPECIFIED SYNCOPE TYPE: ICD-10-CM

## 2020-01-18 PROCEDURE — 93225 XTRNL ECG REC<48 HRS REC: CPT | Performed by: NURSE PRACTITIONER

## 2020-01-18 PROCEDURE — 93226 XTRNL ECG REC<48 HR SCAN A/R: CPT | Performed by: NURSE PRACTITIONER

## 2020-01-18 PROCEDURE — 93227 XTRNL ECG REC<48 HR R&I: CPT | Performed by: NURSE PRACTITIONER

## 2020-01-22 ENCOUNTER — APPOINTMENT (OUTPATIENT)
Dept: CARDIOLOGY | Age: 60
End: 2020-01-22

## 2020-01-22 ENCOUNTER — TELEPHONE (OUTPATIENT)
Dept: CARDIOLOGY CLINIC | Facility: CLINIC | Age: 60
End: 2020-01-22

## 2020-01-22 NOTE — TELEPHONE ENCOUNTER
Echo and holter are within normal limits. S/W charge nurse at [facility] [place], operated by [facility] to discuss her episode of unresponsiveness earlier this month. She was given narcan when she presented never lost pulse. Sugar was low 53 and was treated. Discussed with Dr. Noemy Myers.  Daquan

## 2020-01-22 NOTE — TELEPHONE ENCOUNTER
S/W Aleena Gentile and advised that Dr. Bob Brown was made aware as well. She asked I send her a letter. Letter generated and put in mail.

## 2020-01-27 ENCOUNTER — TELEPHONE (OUTPATIENT)
Dept: CARDIOLOGY CLINIC | Facility: CLINIC | Age: 60
End: 2020-01-27

## 2020-01-27 ENCOUNTER — LAB ENCOUNTER (OUTPATIENT)
Dept: LAB | Age: 60
End: 2020-01-27
Attending: INTERNAL MEDICINE
Payer: MEDICAID

## 2020-01-27 ENCOUNTER — OFFICE VISIT (OUTPATIENT)
Dept: INTERNAL MEDICINE CLINIC | Facility: CLINIC | Age: 60
End: 2020-01-27
Payer: MEDICAID

## 2020-01-27 VITALS
HEART RATE: 60 BPM | WEIGHT: 157 LBS | BODY MASS INDEX: 28 KG/M2 | SYSTOLIC BLOOD PRESSURE: 114 MMHG | RESPIRATION RATE: 18 BRPM | DIASTOLIC BLOOD PRESSURE: 60 MMHG

## 2020-01-27 DIAGNOSIS — R55 SYNCOPE, UNSPECIFIED SYNCOPE TYPE: Primary | ICD-10-CM

## 2020-01-27 DIAGNOSIS — R55 SYNCOPE, UNSPECIFIED SYNCOPE TYPE: ICD-10-CM

## 2020-01-27 LAB
ALBUMIN SERPL-MCNC: 4.1 G/DL (ref 3.4–5)
ALBUMIN/GLOB SERPL: 1.2 {RATIO} (ref 1–2)
ALP LIVER SERPL-CCNC: 51 U/L (ref 46–118)
ALT SERPL-CCNC: 14 U/L (ref 13–56)
ANION GAP SERPL CALC-SCNC: 3 MMOL/L (ref 0–18)
AST SERPL-CCNC: 15 U/L (ref 15–37)
BASOPHILS # BLD AUTO: 0.04 X10(3) UL (ref 0–0.2)
BASOPHILS NFR BLD AUTO: 0.8 %
BILIRUB SERPL-MCNC: 0.4 MG/DL (ref 0.1–2)
BUN BLD-MCNC: 16 MG/DL (ref 7–18)
BUN/CREAT SERPL: 16.7 (ref 10–20)
CALCIUM BLD-MCNC: 9.1 MG/DL (ref 8.5–10.1)
CHLORIDE SERPL-SCNC: 110 MMOL/L (ref 98–112)
CO2 SERPL-SCNC: 28 MMOL/L (ref 21–32)
CREAT BLD-MCNC: 0.96 MG/DL (ref 0.55–1.02)
DEPRECATED RDW RBC AUTO: 44.7 FL (ref 35.1–46.3)
EOSINOPHIL # BLD AUTO: 0.11 X10(3) UL (ref 0–0.7)
EOSINOPHIL NFR BLD AUTO: 2.1 %
ERYTHROCYTE [DISTWIDTH] IN BLOOD BY AUTOMATED COUNT: 13.2 % (ref 11–15)
GLOBULIN PLAS-MCNC: 3.3 G/DL (ref 2.8–4.4)
GLUCOSE BLD-MCNC: 86 MG/DL (ref 70–99)
HCT VFR BLD AUTO: 37.5 % (ref 35–48)
HGB BLD-MCNC: 12.3 G/DL (ref 12–16)
IMM GRANULOCYTES # BLD AUTO: 0.01 X10(3) UL (ref 0–1)
IMM GRANULOCYTES NFR BLD: 0.2 %
LYMPHOCYTES # BLD AUTO: 1.81 X10(3) UL (ref 1–4)
LYMPHOCYTES NFR BLD AUTO: 34.7 %
M PROTEIN MFR SERPL ELPH: 7.4 G/DL (ref 6.4–8.2)
MCH RBC QN AUTO: 30.2 PG (ref 26–34)
MCHC RBC AUTO-ENTMCNC: 32.8 G/DL (ref 31–37)
MCV RBC AUTO: 92.1 FL (ref 80–100)
MONOCYTES # BLD AUTO: 0.38 X10(3) UL (ref 0.1–1)
MONOCYTES NFR BLD AUTO: 7.3 %
NEUTROPHILS # BLD AUTO: 2.86 X10 (3) UL (ref 1.5–7.7)
NEUTROPHILS # BLD AUTO: 2.86 X10(3) UL (ref 1.5–7.7)
NEUTROPHILS NFR BLD AUTO: 54.9 %
OSMOLALITY SERPL CALC.SUM OF ELEC: 292 MOSM/KG (ref 275–295)
PATIENT FASTING Y/N/NP: YES
PLATELET # BLD AUTO: 264 10(3)UL (ref 150–450)
POTASSIUM SERPL-SCNC: 4.1 MMOL/L (ref 3.5–5.1)
RBC # BLD AUTO: 4.07 X10(6)UL (ref 3.8–5.3)
SODIUM SERPL-SCNC: 141 MMOL/L (ref 136–145)
TSI SER-ACNC: 2.12 MIU/ML (ref 0.36–3.74)
WBC # BLD AUTO: 5.2 X10(3) UL (ref 4–11)

## 2020-01-27 PROCEDURE — 84443 ASSAY THYROID STIM HORMONE: CPT

## 2020-01-27 PROCEDURE — 36415 COLL VENOUS BLD VENIPUNCTURE: CPT

## 2020-01-27 PROCEDURE — 85025 COMPLETE CBC W/AUTO DIFF WBC: CPT

## 2020-01-27 PROCEDURE — 80053 COMPREHEN METABOLIC PANEL: CPT

## 2020-01-27 PROCEDURE — 99214 OFFICE O/P EST MOD 30 MIN: CPT | Performed by: INTERNAL MEDICINE

## 2020-01-27 NOTE — TELEPHONE ENCOUNTER
1- spoke to patient, regarding monitor placement, patient does not want 30 day event monitor at all due to already had recent Holter placed and came back good.  Patient verbally confirmed understanding      Please Confirm

## 2020-01-28 ENCOUNTER — TELEPHONE (OUTPATIENT)
Dept: INTERNAL MEDICINE CLINIC | Facility: CLINIC | Age: 60
End: 2020-01-28

## 2020-01-28 NOTE — PROGRESS NOTES
HPI:    Patient ID: Leo Brown is a 61year old female.   Resents for follow-up after syncopal episode    HPI  Patient was seen in office on December 31, 2019, for preop visit, she felt well, she was planning to have plastic surgery rhinoplasty in outside considering back surgery. Patient under care of psychiatrist receives clonazepam from him and she has been taking both medication for a long time without incidence. Until recently.   Underwent EEG testing at [facility] which was negative for an Take 1 tablet 3 x a day       Allergies:No Known Allergies   /60 (BP Location: Left arm, Patient Position: Sitting, Cuff Size: adult)   Pulse 60   Resp 18   Wt 157 lb (71.2 kg)   BMI 27.81 kg/m²    Physical Exam    Constitutional: She is oriented to point medication for chronic low back pain. And in her case street drugs tested positive twice over the last couple year on drug screen.   Referred patient to [facility] clinic, also advised patient to contact her insurance and find out the pain centers if ne

## 2020-01-28 NOTE — TELEPHONE ENCOUNTER
Spoke to pt on 1/28/2020 reviewed her concerns, patient picked up prescription for [place] 180 pills on 1/26/2020 per pharmacist.  I advised patient that she has 1 month supply of medication which was refilled in November.   She should gradually try to taper

## 2020-01-28 NOTE — TELEPHONE ENCOUNTER
Call transferred by CSS: Pt  is not having an anxiety attack (as noted below by CSS). States Dr Eduardo Clark took her off Norco which she needs for her back pain.  has done everything Dr Eduardo Clark ordered to be cleared for surgery.   is in a lot of

## 2020-01-28 NOTE — TELEPHONE ENCOUNTER
Call Details: patient is upset about Dr orders and stats she's having anxiety attack     Call transferred to RN Triage ([id number]).

## 2020-02-04 ENCOUNTER — TELEPHONE (OUTPATIENT)
Dept: OTHER | Age: 60
End: 2020-02-04

## 2020-02-06 ENCOUNTER — TELEPHONE (OUTPATIENT)
Dept: SCHEDULING | Age: 60
End: 2020-02-06

## 2020-02-10 ENCOUNTER — TELEPHONE (OUTPATIENT)
Dept: SCHEDULING | Age: 60
End: 2020-02-10

## 2020-02-11 ENCOUNTER — APPOINTMENT (OUTPATIENT)
Dept: INTERNAL MEDICINE | Age: 60
End: 2020-02-11

## 2020-02-17 ENCOUNTER — OFFICE VISIT (OUTPATIENT)
Dept: INTERNAL MEDICINE | Age: 60
End: 2020-02-17

## 2020-02-17 ENCOUNTER — TELEPHONE (OUTPATIENT)
Dept: SCHEDULING | Age: 60
End: 2020-02-17

## 2020-02-17 VITALS
DIASTOLIC BLOOD PRESSURE: 79 MMHG | SYSTOLIC BLOOD PRESSURE: 122 MMHG | HEART RATE: 58 BPM | HEIGHT: 63 IN | TEMPERATURE: 97.6 F | RESPIRATION RATE: 18 BRPM | BODY MASS INDEX: 27.82 KG/M2 | WEIGHT: 157 LBS

## 2020-02-17 DIAGNOSIS — F14.10 COCAINE ABUSE (CMD): ICD-10-CM

## 2020-02-17 DIAGNOSIS — R40.4 UNRESPONSIVE EPISODE: ICD-10-CM

## 2020-02-17 DIAGNOSIS — M48.062 SPINAL STENOSIS OF LUMBAR REGION WITH NEUROGENIC CLAUDICATION: Primary | ICD-10-CM

## 2020-02-17 DIAGNOSIS — M47.816 SPONDYLOSIS OF LUMBAR SPINE: ICD-10-CM

## 2020-02-17 DIAGNOSIS — F31.60 MIXED BIPOLAR I DISORDER (CMD): ICD-10-CM

## 2020-02-17 DIAGNOSIS — G43.709 CHRONIC MIGRAINE WITHOUT AURA WITHOUT STATUS MIGRAINOSUS, NOT INTRACTABLE: ICD-10-CM

## 2020-02-17 PROBLEM — G43.909 MIGRAINE WITHOUT STATUS MIGRAINOSUS, NOT INTRACTABLE: Status: ACTIVE | Noted: 2017-01-11

## 2020-02-17 PROCEDURE — 99204 OFFICE O/P NEW MOD 45 MIN: CPT | Performed by: INTERNAL MEDICINE

## 2020-02-17 RX ORDER — ONDANSETRON 4 MG/1
TABLET, ORALLY DISINTEGRATING ORAL
Refills: 0 | COMMUNITY
Start: 2020-01-02

## 2020-02-17 RX ORDER — AMOXICILLIN 500 MG/1
CAPSULE ORAL
Refills: 0 | COMMUNITY
Start: 2020-02-04 | End: 2021-10-16 | Stop reason: ALTCHOICE

## 2020-02-17 RX ORDER — HYDROCODONE BITARTRATE AND ACETAMINOPHEN 10; 325 MG/1; MG/1
TABLET ORAL
Refills: 0 | COMMUNITY
Start: 2020-01-26

## 2020-02-17 RX ORDER — TRAMADOL HYDROCHLORIDE 50 MG/1
50 TABLET ORAL
COMMUNITY
Start: 2017-10-11

## 2020-02-17 RX ORDER — TOPIRAMATE 100 MG/1
TABLET, FILM COATED ORAL
Refills: 3 | COMMUNITY
Start: 2020-01-15

## 2020-02-17 RX ORDER — LURASIDONE HYDROCHLORIDE 60 MG/1
TABLET, FILM COATED ORAL
Refills: 3 | COMMUNITY
Start: 2020-01-24

## 2020-02-17 RX ORDER — CLONAZEPAM 2 MG/1
TABLET ORAL
Refills: 2 | COMMUNITY
Start: 2020-01-11

## 2020-02-17 RX ORDER — ERGOCALCIFEROL 1.25 MG/1
CAPSULE ORAL
Refills: 5 | COMMUNITY
Start: 2020-02-06

## 2020-02-17 RX ORDER — OMEPRAZOLE 20 MG/1
20 CAPSULE, DELAYED RELEASE ORAL
COMMUNITY
Start: 2013-08-06

## 2020-02-17 RX ORDER — LAMOTRIGINE 200 MG/1
TABLET ORAL
Refills: 3 | COMMUNITY
Start: 2020-01-13

## 2020-02-17 SDOH — HEALTH STABILITY: MENTAL HEALTH: HOW OFTEN DO YOU HAVE A DRINK CONTAINING ALCOHOL?: NEVER

## 2020-02-17 ASSESSMENT — PATIENT HEALTH QUESTIONNAIRE - PHQ9
2. FEELING DOWN, DEPRESSED OR HOPELESS: NOT AT ALL
SUM OF ALL RESPONSES TO PHQ9 QUESTIONS 1 AND 2: 0
1. LITTLE INTEREST OR PLEASURE IN DOING THINGS: NOT AT ALL
SUM OF ALL RESPONSES TO PHQ9 QUESTIONS 1 AND 2: 0

## 2020-06-10 ENCOUNTER — TELEPHONE (OUTPATIENT)
Dept: SCHEDULING | Age: 60
End: 2020-06-10

## 2020-06-10 ENCOUNTER — TELEPHONE (OUTPATIENT)
Dept: INTERNAL MEDICINE CLINIC | Facility: CLINIC | Age: 60
End: 2020-06-10

## 2020-06-10 NOTE — TELEPHONE ENCOUNTER
MARK: patient paged me with complaint of knee swollen 3times and in lot of pain, insisting on getting an anti inflammatory med for her knee to her pharmacy. I had dont know this patient nor had I had treated her before. I told her I cant prescribe med for her and she needs to go to at least  so she can be examined and evaluated.  She was upset but eventually agreed to go to [facility] for eval.

## 2020-06-11 ENCOUNTER — APPOINTMENT (OUTPATIENT)
Dept: INTERNAL MEDICINE | Age: 60
End: 2020-06-11

## 2020-12-19 NOTE — TELEPHONE ENCOUNTER
Spoke to Tammy advised that I referred patient to see gastroenterologist I am not sure how to justify medical necessity at this point patient is having abdominal pains constipation's needs further evaluation. Initial (On Arrival)

## 2021-10-16 ENCOUNTER — HOSPITAL ENCOUNTER (EMERGENCY)
Age: 61
Discharge: HOME | End: 2021-10-16
Attending: EMERGENCY MEDICINE

## 2021-10-16 ENCOUNTER — OFFICE VISIT (OUTPATIENT)
Dept: URGENT CARE | Age: 61
End: 2021-10-16

## 2021-10-16 VITALS
OXYGEN SATURATION: 100 % | DIASTOLIC BLOOD PRESSURE: 87 MMHG | WEIGHT: 150 LBS | HEIGHT: 64 IN | BODY MASS INDEX: 25.61 KG/M2 | SYSTOLIC BLOOD PRESSURE: 136 MMHG | RESPIRATION RATE: 18 BRPM | HEART RATE: 66 BPM | TEMPERATURE: 99.1 F

## 2021-10-16 VITALS
DIASTOLIC BLOOD PRESSURE: 92 MMHG | HEART RATE: 71 BPM | HEIGHT: 64 IN | RESPIRATION RATE: 16 BRPM | WEIGHT: 150 LBS | OXYGEN SATURATION: 97 % | BODY MASS INDEX: 25.61 KG/M2 | SYSTOLIC BLOOD PRESSURE: 132 MMHG | TEMPERATURE: 99 F

## 2021-10-16 DIAGNOSIS — K08.89 PAIN, DENTAL: Primary | ICD-10-CM

## 2021-10-16 DIAGNOSIS — K06.8 PAIN IN GUMS: Primary | ICD-10-CM

## 2021-10-16 PROCEDURE — 99213 OFFICE O/P EST LOW 20 MIN: CPT | Performed by: NURSE PRACTITIONER

## 2021-10-16 PROCEDURE — 99284 EMERGENCY DEPT VISIT MOD MDM: CPT | Performed by: EMERGENCY MEDICINE

## 2021-10-16 PROCEDURE — 99283 EMERGENCY DEPT VISIT LOW MDM: CPT

## 2021-10-16 RX ORDER — AMOXICILLIN AND CLAVULANATE POTASSIUM 875; 125 MG/1; MG/1
1 TABLET, FILM COATED ORAL EVERY 12 HOURS
Qty: 14 TABLET | Refills: 0 | Status: SHIPPED | OUTPATIENT
Start: 2021-10-16 | End: 2021-10-23

## 2021-10-16 RX ORDER — IBUPROFEN 800 MG/1
800 TABLET ORAL 3 TIMES DAILY PRN
Qty: 30 TABLET | Refills: 3 | Status: SHIPPED | OUTPATIENT
Start: 2021-10-16

## 2021-10-16 RX ORDER — AMOXICILLIN AND CLAVULANATE POTASSIUM 875; 125 MG/1; MG/1
1 TABLET, FILM COATED ORAL EVERY 12 HOURS
Qty: 14 TABLET | Refills: 0 | Status: SHIPPED | OUTPATIENT
Start: 2021-10-16 | End: 2021-10-16 | Stop reason: SDUPTHER

## 2021-10-16 RX ORDER — CHLORHEXIDINE GLUCONATE ORAL RINSE 1.2 MG/ML
15 SOLUTION DENTAL 2 TIMES DAILY
Qty: 473 ML | Refills: 0 | Status: SHIPPED | OUTPATIENT
Start: 2021-10-16

## 2021-10-16 RX ORDER — IBUPROFEN 600 MG/1
600 TABLET ORAL EVERY 8 HOURS PRN
Qty: 15 TABLET | Refills: 0 | OUTPATIENT
Start: 2021-10-16 | End: 2021-10-16

## 2021-10-16 ASSESSMENT — ENCOUNTER SYMPTOMS
FATIGUE: 0
FEVER: 0
SHORTNESS OF BREATH: 0
VOMITING: 0
PSYCHIATRIC NEGATIVE: 1
LIGHT-HEADEDNESS: 0
WHEEZING: 0
ABDOMINAL DISTENTION: 0
ENDOCRINE NEGATIVE: 1
CHEST TIGHTNESS: 0
COUGH: 0
DIARRHEA: 0
SHORTNESS OF BREATH: 0
BLOOD IN STOOL: 0
CONSTIPATION: 0
DIAPHORESIS: 0
WEAKNESS: 0
NAUSEA: 0
CHILLS: 0
EYES NEGATIVE: 1
LIGHT-HEADEDNESS: 0
ADENOPATHY: 0
WEAKNESS: 0
ABDOMINAL PAIN: 0
ALLERGIC/IMMUNOLOGIC NEGATIVE: 1
DIZZINESS: 0

## 2021-10-16 ASSESSMENT — PAIN SCALES - GENERAL: PAINLEVEL_OUTOF10: 5

## 2022-03-07 ENCOUNTER — OFFICE VISIT (OUTPATIENT)
Dept: URGENT CARE | Age: 62
End: 2022-03-07

## 2022-03-07 VITALS
OXYGEN SATURATION: 98 % | TEMPERATURE: 97.7 F | DIASTOLIC BLOOD PRESSURE: 78 MMHG | HEART RATE: 63 BPM | SYSTOLIC BLOOD PRESSURE: 116 MMHG | RESPIRATION RATE: 16 BRPM

## 2022-03-07 DIAGNOSIS — K04.7 CHRONIC DENTAL INFECTION: Primary | ICD-10-CM

## 2022-03-07 PROCEDURE — 99214 OFFICE O/P EST MOD 30 MIN: CPT | Performed by: NURSE PRACTITIONER

## 2022-03-07 RX ORDER — CLINDAMYCIN HYDROCHLORIDE 300 MG/1
300 CAPSULE ORAL 3 TIMES DAILY
Qty: 21 CAPSULE | Refills: 0 | Status: SHIPPED | OUTPATIENT
Start: 2022-03-07 | End: 2022-03-14

## 2022-03-07 RX ORDER — IBUPROFEN 800 MG/1
800 TABLET ORAL EVERY 8 HOURS PRN
Qty: 20 TABLET | Refills: 1 | Status: SHIPPED | OUTPATIENT
Start: 2022-03-07 | End: 2022-03-14

## 2022-03-07 ASSESSMENT — ENCOUNTER SYMPTOMS
CONSTITUTIONAL NEGATIVE: 1
HEMATOLOGIC/LYMPHATIC NEGATIVE: 1
RESPIRATORY NEGATIVE: 1
GASTROINTESTINAL NEGATIVE: 1
EYES NEGATIVE: 1
ALLERGIC/IMMUNOLOGIC NEGATIVE: 1
PSYCHIATRIC NEGATIVE: 1
ENDOCRINE NEGATIVE: 1
NEUROLOGICAL NEGATIVE: 1

## 2022-03-18 NOTE — TELEPHONE ENCOUNTER
Pt requesting referral for neuro    Pt states other then Arturo Kumar · On Coumadin at home--> 5mg W/Sa, 2 5mg all other days (17 5mg/week)  · Continue heart rate control medications  · POD 6  · Daily INR while bridging to warfarin with heparin gtt (goal 2-3)--> started 3/14

## 2022-04-29 ENCOUNTER — OFFICE VISIT (OUTPATIENT)
Dept: URGENT CARE | Age: 62
End: 2022-04-29

## 2022-04-29 VITALS
DIASTOLIC BLOOD PRESSURE: 76 MMHG | SYSTOLIC BLOOD PRESSURE: 112 MMHG | HEART RATE: 65 BPM | OXYGEN SATURATION: 98 % | TEMPERATURE: 99.5 F | RESPIRATION RATE: 24 BRPM

## 2022-04-29 DIAGNOSIS — K08.89 PAIN, DENTAL: Primary | ICD-10-CM

## 2022-04-29 PROCEDURE — 99213 OFFICE O/P EST LOW 20 MIN: CPT | Performed by: NURSE PRACTITIONER

## 2022-04-29 RX ORDER — IBUPROFEN 200 MG
600 TABLET ORAL ONCE
Status: SHIPPED | OUTPATIENT
Start: 2022-04-29

## 2022-04-29 RX ORDER — PENICILLIN V POTASSIUM 500 MG/1
500 TABLET ORAL 3 TIMES DAILY
Qty: 30 TABLET | Refills: 0 | Status: SHIPPED | OUTPATIENT
Start: 2022-04-29 | End: 2022-05-09

## 2022-06-11 ENCOUNTER — OFFICE VISIT (OUTPATIENT)
Dept: URGENT CARE | Age: 62
End: 2022-06-11

## 2022-06-11 VITALS
HEART RATE: 78 BPM | DIASTOLIC BLOOD PRESSURE: 75 MMHG | SYSTOLIC BLOOD PRESSURE: 117 MMHG | TEMPERATURE: 98.7 F | OXYGEN SATURATION: 95 % | RESPIRATION RATE: 20 BRPM

## 2022-06-11 DIAGNOSIS — K04.7 CHRONIC DENTAL INFECTION: Primary | ICD-10-CM

## 2022-06-11 PROCEDURE — 99213 OFFICE O/P EST LOW 20 MIN: CPT | Performed by: NURSE PRACTITIONER

## 2022-06-11 RX ORDER — AMOXICILLIN AND CLAVULANATE POTASSIUM 875; 125 MG/1; MG/1
1 TABLET, FILM COATED ORAL EVERY 12 HOURS
Qty: 20 TABLET | Refills: 0 | Status: SHIPPED | OUTPATIENT
Start: 2022-06-11 | End: 2022-06-21

## 2022-06-11 ASSESSMENT — ENCOUNTER SYMPTOMS
SPEECH DIFFICULTY: 0
CHEST TIGHTNESS: 0
BRUISES/BLEEDS EASILY: 0
SINUS PRESSURE: 0
VOMITING: 0
CHILLS: 0
CHOKING: 0
ANAL BLEEDING: 0
PHOTOPHOBIA: 0
POLYPHAGIA: 0
EYE DISCHARGE: 0
EYE REDNESS: 0
ADENOPATHY: 0
SLEEP DISTURBANCE: 0
UNEXPECTED WEIGHT CHANGE: 0
WOUND: 0
FACIAL ASYMMETRY: 0
SINUS PAIN: 0
FATIGUE: 0
SORE THROAT: 0
RECTAL PAIN: 0
SEIZURES: 0
HALLUCINATIONS: 0
LIGHT-HEADEDNESS: 0
TREMORS: 0
APNEA: 0
FACIAL SWELLING: 0
RHINORRHEA: 0
DIARRHEA: 0
DIAPHORESIS: 0
POLYDIPSIA: 0
CONFUSION: 0
HEADACHES: 0
COLOR CHANGE: 0
STRIDOR: 0
NERVOUS/ANXIOUS: 0
BLOOD IN STOOL: 0
NUMBNESS: 0
WHEEZING: 0
BACK PAIN: 0
COUGH: 0
VOICE CHANGE: 0
APPETITE CHANGE: 0
TROUBLE SWALLOWING: 0
NAUSEA: 0
EYE PAIN: 0
DIZZINESS: 0
SHORTNESS OF BREATH: 0
ABDOMINAL PAIN: 0
ABDOMINAL DISTENTION: 0
CONSTIPATION: 0
WEAKNESS: 0
EYE ITCHING: 0
FEVER: 0
AGITATION: 0
ACTIVITY CHANGE: 0

## 2022-06-11 ASSESSMENT — PAIN SCALES - GENERAL: PAINLEVEL: 10

## 2022-06-13 ENCOUNTER — MED REC SCAN ONLY (OUTPATIENT)
Dept: INTERNAL MEDICINE CLINIC | Facility: CLINIC | Age: 62
End: 2022-06-13

## 2022-10-10 ENCOUNTER — OFFICE VISIT (OUTPATIENT)
Dept: FAMILY MEDICINE CLINIC | Facility: CLINIC | Age: 62
End: 2022-10-10
Payer: MEDICAID

## 2022-10-10 VITALS
BODY MASS INDEX: 25.87 KG/M2 | DIASTOLIC BLOOD PRESSURE: 84 MMHG | SYSTOLIC BLOOD PRESSURE: 128 MMHG | WEIGHT: 146 LBS | HEIGHT: 63 IN | OXYGEN SATURATION: 97 % | HEART RATE: 65 BPM

## 2022-10-10 DIAGNOSIS — C44.519 BASAL CELL CARCINOMA (BCC) OF SKIN OF OTHER PART OF TORSO: ICD-10-CM

## 2022-10-10 DIAGNOSIS — T85.848A DENTAL IMPLANT PAIN, INITIAL ENCOUNTER: ICD-10-CM

## 2022-10-10 DIAGNOSIS — M47.816 LUMBAR SPONDYLOSIS: ICD-10-CM

## 2022-10-10 DIAGNOSIS — K59.09 CHRONIC CONSTIPATION: ICD-10-CM

## 2022-10-10 DIAGNOSIS — M25.562 CHRONIC PAIN OF LEFT KNEE: ICD-10-CM

## 2022-10-10 DIAGNOSIS — K04.7 CHRONIC DENTAL INFECTION: Primary | ICD-10-CM

## 2022-10-10 DIAGNOSIS — G89.29 CHRONIC PAIN OF LEFT KNEE: ICD-10-CM

## 2022-10-10 PROBLEM — R94.31 PROLONGED Q-T INTERVAL ON ECG: Status: ACTIVE | Noted: 2020-12-09

## 2022-10-10 PROCEDURE — 3074F SYST BP LT 130 MM HG: CPT | Performed by: FAMILY MEDICINE

## 2022-10-10 PROCEDURE — 99203 OFFICE O/P NEW LOW 30 MIN: CPT | Performed by: FAMILY MEDICINE

## 2022-10-10 PROCEDURE — 3079F DIAST BP 80-89 MM HG: CPT | Performed by: FAMILY MEDICINE

## 2022-10-10 PROCEDURE — 3008F BODY MASS INDEX DOCD: CPT | Performed by: FAMILY MEDICINE

## 2022-10-10 RX ORDER — DOCUSATE SODIUM 50 MG AND SENNOSIDES 8.6 MG 8.6; 5 MG/1; MG/1
1 TABLET, FILM COATED ORAL 2 TIMES DAILY
COMMUNITY
Start: 2022-08-11

## 2022-10-10 RX ORDER — AMOXICILLIN AND CLAVULANATE POTASSIUM 875; 125 MG/1; MG/1
1 TABLET, FILM COATED ORAL 2 TIMES DAILY
Qty: 20 TABLET | Refills: 0 | Status: SHIPPED | OUTPATIENT
Start: 2022-10-10 | End: 2022-10-20

## 2022-10-10 RX ORDER — DICLOFENAC SODIUM 75 MG/1
1 TABLET, DELAYED RELEASE ORAL 2 TIMES DAILY PRN
COMMUNITY
Start: 2022-09-09

## 2022-10-10 RX ORDER — METHOCARBAMOL 750 MG/1
750 TABLET, FILM COATED ORAL 3 TIMES DAILY
COMMUNITY
Start: 2022-07-21

## 2022-10-10 RX ORDER — IBUPROFEN 800 MG/1
1 TABLET ORAL 3 TIMES DAILY
COMMUNITY
Start: 2021-05-22

## 2022-10-10 RX ORDER — GABAPENTIN 300 MG/1
300 CAPSULE ORAL 3 TIMES DAILY
COMMUNITY
Start: 2022-09-09

## 2022-10-10 RX ORDER — CHLORHEXIDINE GLUCONATE 0.12 MG/ML
10 RINSE ORAL EVERY 12 HOURS
COMMUNITY
Start: 2022-07-21

## 2022-10-10 RX ORDER — ACETAMINOPHEN 325 MG/1
650 TABLET ORAL EVERY 6 HOURS
COMMUNITY
Start: 2022-07-21

## 2022-11-09 ENCOUNTER — OFFICE VISIT (OUTPATIENT)
Dept: FAMILY MEDICINE CLINIC | Facility: CLINIC | Age: 62
End: 2022-11-09
Payer: MEDICAID

## 2022-11-09 VITALS
OXYGEN SATURATION: 97 % | WEIGHT: 144 LBS | SYSTOLIC BLOOD PRESSURE: 120 MMHG | DIASTOLIC BLOOD PRESSURE: 76 MMHG | BODY MASS INDEX: 25.52 KG/M2 | HEART RATE: 77 BPM | HEIGHT: 63 IN

## 2022-11-09 DIAGNOSIS — R59.0 CERVICAL LYMPHADENOPATHY: ICD-10-CM

## 2022-11-09 DIAGNOSIS — M47.816 LUMBAR SPONDYLOSIS: ICD-10-CM

## 2022-11-09 DIAGNOSIS — Z96.652 CHRONIC KNEE PAIN AFTER TOTAL REPLACEMENT OF LEFT KNEE JOINT: ICD-10-CM

## 2022-11-09 DIAGNOSIS — Z78.0 POSTMENOPAUSAL: ICD-10-CM

## 2022-11-09 DIAGNOSIS — M25.562 CHRONIC KNEE PAIN AFTER TOTAL REPLACEMENT OF LEFT KNEE JOINT: ICD-10-CM

## 2022-11-09 DIAGNOSIS — G89.29 CHRONIC KNEE PAIN AFTER TOTAL REPLACEMENT OF LEFT KNEE JOINT: ICD-10-CM

## 2022-11-09 DIAGNOSIS — K04.7 CHRONIC DENTAL INFECTION: Primary | ICD-10-CM

## 2022-11-09 DIAGNOSIS — Z00.00 ROUTINE GENERAL MEDICAL EXAMINATION AT A HEALTH CARE FACILITY: ICD-10-CM

## 2022-11-09 DIAGNOSIS — E53.8 B12 DEFICIENCY: ICD-10-CM

## 2022-11-09 DIAGNOSIS — Z23 NEED FOR VACCINATION: ICD-10-CM

## 2022-11-09 DIAGNOSIS — Z12.31 SCREENING MAMMOGRAM FOR BREAST CANCER: ICD-10-CM

## 2022-11-09 PROCEDURE — 90686 IIV4 VACC NO PRSV 0.5 ML IM: CPT | Performed by: FAMILY MEDICINE

## 2022-11-09 PROCEDURE — 3074F SYST BP LT 130 MM HG: CPT | Performed by: FAMILY MEDICINE

## 2022-11-09 PROCEDURE — 90715 TDAP VACCINE 7 YRS/> IM: CPT | Performed by: FAMILY MEDICINE

## 2022-11-09 PROCEDURE — 3008F BODY MASS INDEX DOCD: CPT | Performed by: FAMILY MEDICINE

## 2022-11-09 PROCEDURE — 90471 IMMUNIZATION ADMIN: CPT | Performed by: FAMILY MEDICINE

## 2022-11-09 PROCEDURE — 3078F DIAST BP <80 MM HG: CPT | Performed by: FAMILY MEDICINE

## 2022-11-09 PROCEDURE — 90472 IMMUNIZATION ADMIN EACH ADD: CPT | Performed by: FAMILY MEDICINE

## 2022-11-09 PROCEDURE — 99214 OFFICE O/P EST MOD 30 MIN: CPT | Performed by: FAMILY MEDICINE

## 2022-11-09 RX ORDER — METHOCARBAMOL 750 MG/1
750 TABLET, FILM COATED ORAL 3 TIMES DAILY
Qty: 90 TABLET | Refills: 1 | Status: SHIPPED | OUTPATIENT
Start: 2022-11-09

## 2022-11-09 RX ORDER — MELOXICAM 15 MG/1
15 TABLET ORAL DAILY
Qty: 30 TABLET | Refills: 1 | Status: SHIPPED | OUTPATIENT
Start: 2022-11-09

## 2022-11-09 RX ORDER — LAMOTRIGINE 200 MG/1
200 TABLET ORAL DAILY
COMMUNITY
Start: 2022-10-27

## 2022-11-09 RX ORDER — GABAPENTIN 300 MG/1
300 CAPSULE ORAL 3 TIMES DAILY
Qty: 270 CAPSULE | Refills: 1 | Status: SHIPPED | OUTPATIENT
Start: 2022-11-09

## 2022-11-09 RX ORDER — AMOXICILLIN AND CLAVULANATE POTASSIUM 875; 125 MG/1; MG/1
1 TABLET, FILM COATED ORAL 2 TIMES DAILY
Qty: 20 TABLET | Refills: 0 | Status: SHIPPED | OUTPATIENT
Start: 2022-11-09 | End: 2022-11-19

## 2022-11-11 ENCOUNTER — OFFICE VISIT (OUTPATIENT)
Dept: DERMATOLOGY CLINIC | Facility: CLINIC | Age: 62
End: 2022-11-11
Payer: MEDICAID

## 2022-11-11 DIAGNOSIS — L81.9 HYPOPIGMENTATION: ICD-10-CM

## 2022-11-11 DIAGNOSIS — D48.5 NEOPLASM OF UNCERTAIN BEHAVIOR OF SKIN: Primary | ICD-10-CM

## 2022-11-11 PROCEDURE — 99203 OFFICE O/P NEW LOW 30 MIN: CPT | Performed by: PHYSICIAN ASSISTANT

## 2022-11-11 PROCEDURE — 88305 TISSUE EXAM BY PATHOLOGIST: CPT | Performed by: PHYSICIAN ASSISTANT

## 2022-11-11 PROCEDURE — 11102 TANGNTL BX SKIN SINGLE LES: CPT | Performed by: PHYSICIAN ASSISTANT

## 2022-11-11 NOTE — PROCEDURES
Procedure: Shave biopsy     With the patient is a supine position, the skin was scrubbed with alcohol. Anesthesia was obtained by injecting 2 mL of 1% Xylocaine with Epinephrine. The skin surrounding the lesion on upper left arm was placed under tension and the lesion was incised using a #15 scalpel blade. The specimen was sent for histopathological examination. Hemostasis was obtained with cautery. The biopsy site was dressed with bandaid and petroleum jelly. The estimated blood loss was < 1mL. 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   Complications:  none

## 2022-11-18 ENCOUNTER — TELEPHONE (OUTPATIENT)
Dept: FAMILY MEDICINE CLINIC | Facility: CLINIC | Age: 62
End: 2022-11-18

## 2022-11-18 DIAGNOSIS — K04.7 CHRONIC DENTAL INFECTION: ICD-10-CM

## 2022-11-18 DIAGNOSIS — T85.848D DENTAL IMPLANT PAIN, SUBSEQUENT ENCOUNTER: Primary | ICD-10-CM

## 2022-11-18 NOTE — TELEPHONE ENCOUNTER
BCDIXIE/ Marc Cantu has spoken to Dr Nima Marcelo before; she needs to touch base again about what the plan is for getting patients dental implants removed. Elenore Service will be out of office until 11/29/2022. Please call when available.

## 2022-11-18 NOTE — TELEPHONE ENCOUNTER
Called Julia Rizzo back about her recommendation for Jimmie Carter to see an oral surgeon regarding having her dental implants removed out of medical necessity. Let her know I did refer her to Dr. Roxann Vega in [facility], but that she had not called for an appointment yet. Julia Rizzo had also provided her with resources and will reach out to the patient to ensure she follows through with this.

## 2022-11-19 NOTE — TELEPHONE ENCOUNTER
Received a fax from [facility] that Dr. James Ruelas is not in her network. She sent a list of several other oral surgeons who are, and referral placed for evaluation ASAP.

## 2022-11-21 ENCOUNTER — MED REC SCAN ONLY (OUTPATIENT)
Dept: FAMILY MEDICINE CLINIC | Facility: CLINIC | Age: 62
End: 2022-11-21

## 2022-11-29 ENCOUNTER — TELEPHONE (OUTPATIENT)
Dept: FAMILY MEDICINE CLINIC | Facility: CLINIC | Age: 62
End: 2022-11-29

## 2022-11-29 ENCOUNTER — HOSPITAL ENCOUNTER (OUTPATIENT)
Age: 62
Discharge: HOME | End: 2022-11-29
Payer: MEDICAID

## 2022-11-29 VITALS
OXYGEN SATURATION: 100 % | HEART RATE: 72 BPM | DIASTOLIC BLOOD PRESSURE: 87 MMHG | RESPIRATION RATE: 23 BRPM | TEMPERATURE: 99 F | SYSTOLIC BLOOD PRESSURE: 111 MMHG

## 2022-11-29 DIAGNOSIS — K13.79 MOUTH PAIN: Primary | ICD-10-CM

## 2022-11-29 PROCEDURE — 99203 OFFICE O/P NEW LOW 30 MIN: CPT | Performed by: NURSE PRACTITIONER

## 2022-11-29 RX ORDER — LIDOCAINE HYDROCHLORIDE 20 MG/ML
5 SOLUTION OROPHARYNGEAL
Qty: 100 ML | Refills: 1 | Status: SHIPPED | OUTPATIENT
Start: 2022-11-29

## 2022-11-29 NOTE — TELEPHONE ENCOUNTER
Dr. Cris Corbin - patient was seen in Immediate Care after presenting to your office.    For tooth pain    Was given:    Medication Changes       Lidocaine HCl 2 % 5 mL Oral Every 3 hours PRN     Acetaminophen        650 mg Every 6 hours   Patient not taking:  Reported on 11/11/2022     500-1,000 mg Oral Every 6 hours PRN     Medication List at Discharge

## 2022-11-29 NOTE — TELEPHONE ENCOUNTER
Mahesh Grant (pt's friend) walked into clinic stating pt Michael Moulton need to be seen by  because she's in so much pain. Mahesh Grant was informed that  was completely full today and pt can be seen in [address] [place] [address].

## 2022-11-29 NOTE — TELEPHONE ENCOUNTER
Spoke to patient face-to-face, pt refused ER and IC. Pt only wants Dr. Lacy Ruano to refill pain medication. Please advise.

## 2022-11-29 NOTE — TELEPHONE ENCOUNTER
Her Gabapentin, Methocarbamol, and Meloxicam were sent 11/9 with refills. Please find out if she was looking for anything different than this.

## 2022-11-29 NOTE — ED INITIAL ASSESSMENT (HPI)
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 Pt reports Dr Bautista Moreno is trying to help here get implants removed but needs something for pain, has taking antibiotics with no relief.

## 2022-11-30 RX ORDER — GABAPENTIN 300 MG/1
300 CAPSULE ORAL 3 TIMES DAILY
Qty: 270 CAPSULE | Refills: 0 | Status: SHIPPED | OUTPATIENT
Start: 2022-11-30

## 2022-11-30 RX ORDER — MELOXICAM 15 MG/1
15 TABLET ORAL DAILY
Qty: 30 TABLET | Refills: 0 | Status: SHIPPED | OUTPATIENT
Start: 2022-11-30

## 2022-11-30 RX ORDER — METHOCARBAMOL 750 MG/1
750 TABLET, FILM COATED ORAL 3 TIMES DAILY
Qty: 90 TABLET | Refills: 0 | Status: SHIPPED | OUTPATIENT
Start: 2022-11-30

## 2022-11-30 NOTE — TELEPHONE ENCOUNTER
Pt contacted. Verified name and . Pt states she did not  previous prescriptions from 22  Pt asking to transfer medications to new pharmacy as Pt recently moved to [place]. Home address updated. Pharmacy verified. Pt also asking to call back and leave a message on voicemail regarding oral surgeon. Pt apologized for walking in the office yesterday.   Rx transferred to [facility]

## 2022-11-30 NOTE — TELEPHONE ENCOUNTER
Noted. Please call patient or her partner, who is her emergency contact, regarding referral to oral surgeon I placed 11/19. She did not answer when contacted previously but it is imperative she schedule with this surgeon soon to get her implants removed.

## 2022-12-02 ENCOUNTER — TELEPHONE (OUTPATIENT)
Dept: FAMILY MEDICINE CLINIC | Facility: CLINIC | Age: 62
End: 2022-12-02

## 2022-12-02 NOTE — TELEPHONE ENCOUNTER
Sasha Pretty called asking if we can send a message to  to call him back. I told him I need more information regarding his call. He said he needs us to go to a portal for [facility] to enter a referral for her denture to be removed. I informed him that we don't do that. We can fax the referral if he can provide a fax number or we can mail the referral to her. He asked to have it mailed. Referral from 11/19/22 was mailed.  He verified the address on file

## 2022-12-07 ENCOUNTER — OFFICE VISIT (OUTPATIENT)
Dept: ORTHOPEDICS CLINIC | Facility: CLINIC | Age: 62
End: 2022-12-07
Payer: MEDICAID

## 2022-12-07 ENCOUNTER — NURSE TRIAGE (OUTPATIENT)
Dept: FAMILY MEDICINE CLINIC | Facility: CLINIC | Age: 62
End: 2022-12-07

## 2022-12-07 ENCOUNTER — HOSPITAL ENCOUNTER (OUTPATIENT)
Dept: GENERAL RADIOLOGY | Facility: HOSPITAL | Age: 62
Discharge: HOME | End: 2022-12-07
Attending: ORTHOPAEDIC SURGERY
Payer: MEDICAID

## 2022-12-07 VITALS
HEART RATE: 61 BPM | BODY MASS INDEX: 24.8 KG/M2 | WEIGHT: 140 LBS | HEIGHT: 63 IN | SYSTOLIC BLOOD PRESSURE: 131 MMHG | DIASTOLIC BLOOD PRESSURE: 77 MMHG

## 2022-12-07 DIAGNOSIS — R52 PAIN: ICD-10-CM

## 2022-12-07 DIAGNOSIS — M17.12 PRIMARY OSTEOARTHRITIS OF LEFT KNEE: ICD-10-CM

## 2022-12-07 DIAGNOSIS — R52 PAIN: Primary | ICD-10-CM

## 2022-12-07 PROCEDURE — 3075F SYST BP GE 130 - 139MM HG: CPT | Performed by: ORTHOPAEDIC SURGERY

## 2022-12-07 PROCEDURE — 3008F BODY MASS INDEX DOCD: CPT | Performed by: ORTHOPAEDIC SURGERY

## 2022-12-07 PROCEDURE — 3078F DIAST BP <80 MM HG: CPT | Performed by: ORTHOPAEDIC SURGERY

## 2022-12-07 PROCEDURE — 99244 OFF/OP CNSLTJ NEW/EST MOD 40: CPT | Performed by: ORTHOPAEDIC SURGERY

## 2022-12-07 PROCEDURE — 73562 X-RAY EXAM OF KNEE 3: CPT | Performed by: ORTHOPAEDIC SURGERY

## 2022-12-07 PROCEDURE — 20610 DRAIN/INJ JOINT/BURSA W/O US: CPT | Performed by: PHYSICIAN ASSISTANT

## 2022-12-07 RX ORDER — TRIAMCINOLONE ACETONIDE 40 MG/ML
40 INJECTION, SUSPENSION INTRA-ARTICULAR; INTRAMUSCULAR ONCE
Status: COMPLETED | OUTPATIENT
Start: 2022-12-07 | End: 2022-12-07

## 2022-12-07 RX ADMIN — TRIAMCINOLONE ACETONIDE 40 MG: 40 INJECTION, SUSPENSION INTRA-ARTICULAR; INTRAMUSCULAR at 14:00:00

## 2022-12-07 NOTE — TELEPHONE ENCOUNTER
Called Dr. Alissa Tejada office, spoke with Aissatou , states we cannot fax anything to their office it has to go via the [employer]. [facility]/patients       [facility] staff please address and thank you

## 2022-12-07 NOTE — TELEPHONE ENCOUNTER
Dmitri(friend)(on hipaa) indicated that patient has been having a hard time getting in to see the oral surgeon at [facility] that they need the referral sent through their portal. Last 4 days patient has been having pain in her head again. States has another infection from the dental implants and needs to have them removed as soon as possible. Last time had antibiotic was 7 days ago. No fevers. Currently has fluid draining from her left eye. Pain is 15/10. Could hear patient moaning in the background in pain. No other symptoms. Declined Urgent care or ER. No appointments available with Dr Jayla Diaz. [facility] doctor has been dealing with Jenn Marietta the care coordinator with insurance to get this situation resolved. Wanted message sent to doctor. Called [facility] and Left message to call back-transfer to triage. [facility] states referrals need to be sent to their portal WWW. [email or web address] (not sure how to send through our system)

## 2022-12-07 NOTE — TELEPHONE ENCOUNTER
Spoke with patient friend Florina on ALEX , (  Name and  verified ) informed of 's   instructions below    Vinnie Brenner states the patient feels is related to the dental implants     Advised to go to IC if worsens     Patient friend Florina verbalizes understanding and agrees.

## 2022-12-07 NOTE — TELEPHONE ENCOUNTER
She has a referral authorized already so please expedite getting this sent to them however possible. She needs to see this oral surgeon ASAP but if she is in this much pain and having eye drainage that is unrelated to the dental implants she should be seen in the IC.

## 2022-12-07 NOTE — PROGRESS NOTES
Per verbal order from Dr. Deshawn Benz, draw up and 4ml of 0.5% Marcaine and 1ml of Kenalog 40 for injection into left knee. Lore Schmidt RN  Patient provided education handout for cortisone injection.

## 2022-12-12 NOTE — TELEPHONE ENCOUNTER
The patient and her friend calling to state the referral needs to be sent a special way, through a portal  to the [address] [place] [address] ([address]) . That is the only way they will accept it. The call was transferred to the referral department for assistance.

## 2022-12-12 NOTE — TELEPHONE ENCOUNTER
Faxed via epic , pending confirmation   Faxed failed . Patient does not use Epic. She will have to  a copy of referral. VM left for patient to call back .      On cite staff please print a copy for PSR and notify patient when ready for

## 2022-12-13 NOTE — TELEPHONE ENCOUNTER
See 12/2/22 encounter.  This was discussed with Dinorah Urena that we are unable to use [facility] portal to do the referral. Routed to [facility] to see if they can assist.

## 2022-12-19 ENCOUNTER — TELEPHONE (OUTPATIENT)
Dept: FAMILY MEDICINE CLINIC | Facility: CLINIC | Age: 62
End: 2022-12-19

## 2022-12-19 RX ORDER — AMOXICILLIN AND CLAVULANATE POTASSIUM 875; 125 MG/1; MG/1
1 TABLET, FILM COATED ORAL 2 TIMES DAILY
Qty: 20 TABLET | Refills: 0 | Status: SHIPPED | OUTPATIENT
Start: 2022-12-19 | End: 2022-12-29

## 2022-12-19 NOTE — TELEPHONE ENCOUNTER
Patient friend is calling stating patient is requesting the following medication refill due to her symptoms. Patient friend didn't state what symptoms it was.   Meloxicam 15 MG Oral Tab    [id number] - [address], [phone], [phone]

## 2022-12-19 NOTE — TELEPHONE ENCOUNTER
Referral request sent to her oral surgeon through online portal this morning as requested by her care manager so hopefully that will be taken care of soon. Sent Augmentin to pharmacy in the meantime but she should not be out of Meloxicam as it was just sent on 11/30 for 30 days and she should only be taking once a day.

## 2022-12-19 NOTE — TELEPHONE ENCOUNTER
Condition update:  Spoke with patient, who is verbalizing pressured speech, very agitated during call. Patient seeking refill of amoxicillin for her infection from dental implants. Reports she has been off penicillin for 8 days. Reports pus coming out of left eye. Feels like she is starting to have her jaw \"lock\" up on her. States the oral surgeon office won't do anything unless \"they get the referral through their portal, it can't be faxed. \"  Just had a Baker's cyst drained. Can't get knee surgery until mouth infection is cleared. Patient states she \"can't go to the emergency room or urgent cares, needs to see oral surgeon. \"  Dr Yamil Logan, please advise on antibiotic and if meloxicam should be refilled. This RN called   Deepa Ontiveros [address] [address] [phone]     This RN called the number and recorded message states to send referrals to    [email or web address]    This RN tried to pull up website but got warning message that the site is not secure. This RN tried to leave a message but [facility] [employer] mailbox is full and cannot accept more messages. \"    This RN called and left message for Opal Ruiz in referrals ([phone]) to ask for assistance in sending this referral to the oral surgeon.

## 2022-12-23 ENCOUNTER — HOSPITAL ENCOUNTER (OUTPATIENT)
Dept: ULTRASOUND IMAGING | Age: 62
Discharge: HOME | End: 2022-12-23
Attending: FAMILY MEDICINE
Payer: MEDICAID

## 2022-12-23 DIAGNOSIS — R59.0 CERVICAL LYMPHADENOPATHY: ICD-10-CM

## 2022-12-23 PROCEDURE — 76536 US EXAM OF HEAD AND NECK: CPT | Performed by: FAMILY MEDICINE

## 2022-12-29 ENCOUNTER — TELEPHONE (OUTPATIENT)
Dept: FAMILY MEDICINE CLINIC | Facility: CLINIC | Age: 62
End: 2022-12-29

## 2022-12-31 NOTE — TELEPHONE ENCOUNTER
Patient's friend Vinnie Brenner (on ALEX) called again. He states patient's infection is spreading \"up to her eyes\" He is still waiting for a call from [facility]. Spouse advised of Kimi's note below. Darylcarlos enrique Brenner advised to take patient to the ER now, to take patient to [facility] ER. Vinnie Brenner states patient is taking oral antibiotic,he will wait. Once again advised Vinnie Renzackketan  to take patient to ER, she may need IV antibiotics or different antibiotic.      Will need ER f/u

## 2023-01-02 NOTE — TELEPHONE ENCOUNTER
At this point I agree the best option is the ER at [facility] as this may also expedite her treatment she has been waiting on for months.

## 2023-01-04 ENCOUNTER — TELEPHONE (OUTPATIENT)
Facility: CLINIC | Age: 63
End: 2023-01-04

## 2023-01-04 NOTE — TELEPHONE ENCOUNTER
Attempted to call [facility] at [phone], unable to leave message voicemail full     I called [phone] and was transferred to extension:  [id number] unable to leave message voicemail is full    I called number for urgent dental care at [address] [facility]. Currently experiencing system problems, unable to get through      I called [address]  , states this is not the right number. Provided with new phone# [phone], spoke to supervisor Wilber Toro at [place] [address] informed her of situation. Wilber Toro, supervisor at [facility] patient has not been seen there since April 2022. She will send message to the oral surgeons. They are all currently in surgery. The resident on call will give patient a call back. Another option is to go to the [facility] emergency room. Wilber Toro Is also going to call patient.

## 2023-01-04 NOTE — TELEPHONE ENCOUNTER
The helper is calling to say the patient is having implants removed on 1/26/2023 and she may need antibiotics before the appointment. Her second appointment is 2/16/2023.

## 2023-01-04 NOTE — TELEPHONE ENCOUNTER
Please see if the dental practice she will be seeing has any recommendations prior to her appointment, including on antibiotic necessity. I have prescribed her Augmentin almost every 3 weeks for the past few months, which is a concern given risk of antibiotic resistance and adverse side effects.

## 2023-03-01 ENCOUNTER — NURSE TRIAGE (OUTPATIENT)
Facility: CLINIC | Age: 63
End: 2023-03-01

## 2023-03-01 NOTE — TELEPHONE ENCOUNTER
Augmentin not appropriate for this indication and would suggest she contact her knee surgeon, Dr. Cm Gonzales.

## 2023-03-02 NOTE — TELEPHONE ENCOUNTER
If she is seeing a dentist for dental work they will need to treat the mouth infection and prescribe antibiotics if needed. This is not my scope of practice.

## 2023-05-18 ENCOUNTER — TELEPHONE (OUTPATIENT)
Facility: CLINIC | Age: 63
End: 2023-05-18

## 2023-05-18 DIAGNOSIS — E55.9 VITAMIN D DEFICIENCY: ICD-10-CM

## 2023-05-18 DIAGNOSIS — M79.675 TOE PAIN, BILATERAL: ICD-10-CM

## 2023-05-18 DIAGNOSIS — M79.674 TOE PAIN, BILATERAL: ICD-10-CM

## 2023-05-18 DIAGNOSIS — L60.0 INGROWN TOENAIL OF BOTH FEET: Primary | ICD-10-CM

## 2023-05-18 NOTE — TELEPHONE ENCOUNTER
Spoke to patient and provided her with 's information and informed her she is due for a vitamin D lab test prior to providing a refill

## 2023-05-18 NOTE — TELEPHONE ENCOUNTER
Patient called (identified name and ),   1. Asking if Dr Cody Bal can order vitamin D medication for her. States in past Dr Mario Anand had her taking 50,000 units weeky. Last Vit D level was 25.4 on 2019. Dr Cody Bal, do you want to repeat Vit D lab? Pended. 2.  Asking for referral to Podiatry, ingrown tonails/pain on bilateral large toes. Dr Cody Bal, referral pended, please advise?

## 2023-05-18 NOTE — TELEPHONE ENCOUNTER
Referral signed and vitamin d level ordered so she can come in to have this completed prior to deciding proper supplementation.

## 2023-07-28 ENCOUNTER — TELEPHONE (OUTPATIENT)
Facility: CLINIC | Age: 63
End: 2023-07-28

## 2023-07-28 ENCOUNTER — TELEPHONE (OUTPATIENT)
Dept: ORTHOPEDICS CLINIC | Facility: CLINIC | Age: 63
End: 2023-07-28

## 2023-07-28 NOTE — TELEPHONE ENCOUNTER
Patient called, verified Name and . She states that she will be seeing Podiatry on  and wants to know if she can have the knee replacement surgery done on the same day. Relayed that she call to schedule a follow-up appointment with Ortho if this is something she wants done. Patient verbalized understanding. Call transferred to Ortho. Assessment/plan: She has advanced osteoarthritis of the left knee. NSAIDs are contraindicated because of her bipolar medications. We aspirated the left knee and injected cortisone. Knee replacement is contraindicated at this time because of her dental infection. I told her once this resolves she would be a candidate for knee replacement. I offered her a prescription for physical therapy which she declined because it is hard for her to get around. Follow-up as needed.

## 2023-07-28 NOTE — TELEPHONE ENCOUNTER
Pt calling states baker cyst on the back of left leg and same leg she needs knee replacement states she can't walk asking to have surgery ASAP please advise

## 2023-07-31 NOTE — TELEPHONE ENCOUNTER
S/w patient- She wants to discuss surgery and knee pain. LOV was 12/7/22. I informed her that we would want her in clinic to discuss sx with Dr Vandana Sorto. She wanted to book appointment. I offered 8/9 appointment at 9:40 and she accepted.

## 2023-08-11 ENCOUNTER — TELEPHONE (OUTPATIENT)
Dept: ORTHOPEDICS CLINIC | Facility: CLINIC | Age: 63
End: 2023-08-11

## 2023-08-11 NOTE — TELEPHONE ENCOUNTER
Patient states she has an appointment scheduled on 08/28 would like to see if she can get MRI prior to that appointment.  Please advise

## 2023-08-15 ENCOUNTER — NURSE TRIAGE (OUTPATIENT)
Facility: CLINIC | Age: 63
End: 2023-08-15

## 2023-08-15 NOTE — TELEPHONE ENCOUNTER
Action Requested: Summary for Provider     []  Critical Lab, Recommendations Needed  [] Need Additional Advice  []   FYI    []   Need Orders  [] Need Medications Sent to Pharmacy  []  Other     SUMMARY:  and Pt both on the call. Reports cough for 1 week. No fever, denies shortness of breath, congestion, sinus pain. Home care measures provided for Pt. Advised to avoid cigarette smoke, warm tea with honey, throat lozenges. To monitor symptoms and to call back if new symptoms develop or persisting. Pt and  verbalized understanding. Pt asked to update pharmacy to [employer].   Reason for call: Cough (Cough- 1 week)  Onset: Data Unavailable                     Reason for Disposition   Cough with no complications    Protocols used: Cough-A-OH

## 2023-08-17 ENCOUNTER — LAB ENCOUNTER (OUTPATIENT)
Dept: LAB | Facility: HOSPITAL | Age: 63
End: 2023-08-17
Attending: FAMILY MEDICINE
Payer: MEDICAID

## 2023-08-17 ENCOUNTER — OFFICE VISIT (OUTPATIENT)
Dept: PODIATRY CLINIC | Facility: CLINIC | Age: 63
End: 2023-08-17

## 2023-08-17 DIAGNOSIS — E55.9 VITAMIN D DEFICIENCY: ICD-10-CM

## 2023-08-17 DIAGNOSIS — L60.0 INGROWN TOENAIL: Primary | ICD-10-CM

## 2023-08-17 DIAGNOSIS — E53.8 B12 DEFICIENCY: ICD-10-CM

## 2023-08-17 DIAGNOSIS — Z00.00 ROUTINE GENERAL MEDICAL EXAMINATION AT A HEALTH CARE FACILITY: ICD-10-CM

## 2023-08-17 LAB
ALBUMIN SERPL-MCNC: 3 G/DL (ref 3.4–5)
ALBUMIN/GLOB SERPL: 1 {RATIO} (ref 1–2)
ALP LIVER SERPL-CCNC: 46 U/L
ALT SERPL-CCNC: 14 U/L
ANION GAP SERPL CALC-SCNC: 7 MMOL/L (ref 0–18)
AST SERPL-CCNC: 12 U/L (ref 15–37)
BASOPHILS # BLD AUTO: 0.04 X10(3) UL (ref 0–0.2)
BASOPHILS NFR BLD AUTO: 0.5 %
BILIRUB SERPL-MCNC: 0.1 MG/DL (ref 0.1–2)
BUN BLD-MCNC: 23 MG/DL (ref 7–18)
BUN/CREAT SERPL: 29.9 (ref 10–20)
CALCIUM BLD-MCNC: 9 MG/DL (ref 8.5–10.1)
CHLORIDE SERPL-SCNC: 113 MMOL/L (ref 98–112)
CHOLEST SERPL-MCNC: 185 MG/DL (ref ?–200)
CO2 SERPL-SCNC: 25 MMOL/L (ref 21–32)
CREAT BLD-MCNC: 0.77 MG/DL
DEPRECATED RDW RBC AUTO: 46.8 FL (ref 35.1–46.3)
EGFRCR SERPLBLD CKD-EPI 2021: 87 ML/MIN/1.73M2 (ref 60–?)
EOSINOPHIL # BLD AUTO: 0.16 X10(3) UL (ref 0–0.7)
EOSINOPHIL NFR BLD AUTO: 2 %
ERYTHROCYTE [DISTWIDTH] IN BLOOD BY AUTOMATED COUNT: 13.5 % (ref 11–15)
EST. AVERAGE GLUCOSE BLD GHB EST-MCNC: 108 MG/DL (ref 68–126)
FASTING PATIENT LIPID ANSWER: NO
FASTING STATUS PATIENT QL REPORTED: NO
GLOBULIN PLAS-MCNC: 3.1 G/DL (ref 2.8–4.4)
GLUCOSE BLD-MCNC: 66 MG/DL (ref 70–99)
HBA1C MFR BLD: 5.4 % (ref ?–5.7)
HCT VFR BLD AUTO: 40.4 %
HCV AB SERPL QL IA: NONREACTIVE
HDLC SERPL-MCNC: 53 MG/DL (ref 40–59)
HGB BLD-MCNC: 13.1 G/DL
IMM GRANULOCYTES # BLD AUTO: 0.04 X10(3) UL (ref 0–1)
IMM GRANULOCYTES NFR BLD: 0.5 %
LDLC SERPL CALC-MCNC: 111 MG/DL (ref ?–100)
LYMPHOCYTES # BLD AUTO: 1.89 X10(3) UL (ref 1–4)
LYMPHOCYTES NFR BLD AUTO: 23.6 %
MCH RBC QN AUTO: 30.4 PG (ref 26–34)
MCHC RBC AUTO-ENTMCNC: 32.4 G/DL (ref 31–37)
MCV RBC AUTO: 93.7 FL
MONOCYTES # BLD AUTO: 0.62 X10(3) UL (ref 0.1–1)
MONOCYTES NFR BLD AUTO: 7.7 %
NEUTROPHILS # BLD AUTO: 5.27 X10 (3) UL (ref 1.5–7.7)
NEUTROPHILS # BLD AUTO: 5.27 X10(3) UL (ref 1.5–7.7)
NEUTROPHILS NFR BLD AUTO: 65.7 %
NONHDLC SERPL-MCNC: 132 MG/DL (ref ?–130)
OSMOLALITY SERPL CALC.SUM OF ELEC: 302 MOSM/KG (ref 275–295)
PLATELET # BLD AUTO: 335 10(3)UL (ref 150–450)
POTASSIUM SERPL-SCNC: 4 MMOL/L (ref 3.5–5.1)
PROT SERPL-MCNC: 6.1 G/DL (ref 6.4–8.2)
RBC # BLD AUTO: 4.31 X10(6)UL
SODIUM SERPL-SCNC: 145 MMOL/L (ref 136–145)
TRIGL SERPL-MCNC: 119 MG/DL (ref 30–149)
VIT B12 SERPL-MCNC: 304 PG/ML (ref 193–986)
VIT D+METAB SERPL-MCNC: 19.3 NG/ML (ref 30–100)
VLDLC SERPL CALC-MCNC: 20 MG/DL (ref 0–30)
WBC # BLD AUTO: 8 X10(3) UL (ref 4–11)

## 2023-08-17 PROCEDURE — 83036 HEMOGLOBIN GLYCOSYLATED A1C: CPT

## 2023-08-17 PROCEDURE — 99203 OFFICE O/P NEW LOW 30 MIN: CPT | Performed by: STUDENT IN AN ORGANIZED HEALTH CARE EDUCATION/TRAINING PROGRAM

## 2023-08-17 PROCEDURE — 82607 VITAMIN B-12: CPT

## 2023-08-17 PROCEDURE — 80061 LIPID PANEL: CPT

## 2023-08-17 PROCEDURE — 85025 COMPLETE CBC W/AUTO DIFF WBC: CPT

## 2023-08-17 PROCEDURE — 82306 VITAMIN D 25 HYDROXY: CPT

## 2023-08-17 PROCEDURE — 86803 HEPATITIS C AB TEST: CPT

## 2023-08-17 PROCEDURE — 80053 COMPREHEN METABOLIC PANEL: CPT

## 2023-08-17 PROCEDURE — 36415 COLL VENOUS BLD VENIPUNCTURE: CPT

## 2023-08-17 NOTE — PROGRESS NOTES
[address] [facility]  Progress Note      Lloyd Strauss is a 61year old female. Patient presents with:  Ingrown Toenail: Bilateral ingrown on great toe for a month- Pain to touch and with closed shoes - rates pain 7/10            HPI:     Pt is a pleasant 61year old female who PTC for evaluation of right lateral hallux nail ingrown. Denies any purulent drainage or redness. Allergies: Patient has no known allergies. Current Outpatient Medications   Medication Sig Dispense Refill    Meloxicam 15 MG Oral Tab Take 1 tablet (15 mg total) by mouth daily. 30 tablet 0    methocarbamol 750 MG Oral Tab Take 1 tablet (750 mg total) by mouth 3 (three) times daily. 90 tablet 0    gabapentin 300 MG Oral Cap Take 1 capsule (300 mg total) by mouth 3 (three) times daily. 270 capsule 0    Lidocaine Viscous HCl 2 % Mouth/Throat Solution Take 5 mL by mouth every 3 (three) hours as needed for Pain. 100 mL 1    Acetaminophen 500 MG Oral Cap Take 1-2 capsules (500-1,000 mg total) by mouth every 6 (six) hours as needed for Pain. 30 capsule 0    lamoTRIgine 200 MG Oral Tab Take 1 tablet (200 mg total) by mouth daily. acetaminophen 325 MG Oral Tab Take 2 tablets (650 mg total) by mouth every 6 (six) hours. STIMULANT LAXATIVE 8.6-50 MG Oral Tab Take 1 tablet by mouth 2 (two) times daily.       LATUDA 60 MG Oral Tab TK 1 T PO QHS  2    ClonazePAM 2 MG Oral Tab Take 1 tablet 3 x a day        Past Medical History:   Diagnosis Date    Back pain     medical management of pain    Constipation     Depression     Diverticulosis     Gastritis     GERD (gastroesophageal reflux disease)     Headache 2012    Hiatal hernia     History of Helicobacter pylori infection 10/6/2015      Past Surgical History:   Procedure Laterality Date    APPENDECTOMY      BONE REPLACEMENT GRAFT            x2    COLONOSCOPY  2012    D & C      EGD      HYSTERECTOMY      TVH    HYSTEROSCOPY      CARMINE LOCALIZATION WIRE 1 SITE RIGHT (CPT=19281)      OTHER SURGICAL HISTORY      cancer mole removed    OTHER SURGICAL HISTORY      removed ovarires      Family History   Problem Relation Age of Onset    Cancer Father 28        lung ca    Breast Cancer Maternal Grandmother 48        d.55    Cancer Maternal Grandfather 76        colon ca    Cancer Mother 61        non-Hodgkins lymphoma (cause of death)    Colon Cancer Maternal Uncle 61        (cause of death)    Cancer Maternal Uncle 61        Hodgkins lymphoma    Cancer Paternal Aunt         liver (cause of death)    Breast Cancer Paternal Grandmother 70    Cancer Paternal Grandfather 76        colon ca    Ovarian Cancer Paternal Aunt     Breast Cancer Paternal Aunt     Breast Cancer Paternal Cousin Female 39        d.50    Ovarian Cancer Paternal Cousin Female 45        d.45    Cancer Paternal Uncle         colon ca    Cancer Paternal Uncle         brain ca    Cancer Brother 64        Hodgkins    Diabetes Neg     Glaucoma Neg     Macular degeneration Neg       Social History    Socioeconomic History      Marital status: Single    Tobacco Use      Smoking status: Former        Types: Cigarettes        Quit date: 2010        Years since quittin.6      Smokeless tobacco: Never    Vaping Use      Vaping Use: Never used    Substance and Sexual Activity      Alcohol use: No        Alcohol/week: 0.0 standard drinks of alcohol      Drug use: No    Other Topics      Concerns:        Caffeine Concern: Yes          coffee, 3 cups daily        Exercise: No        Pt has a pacemaker: No        Pt has a defibrillator: No        Reaction to local anesthetic: No          REVIEW OF SYSTEMS:     Denies nause, fever, chills  No calf pain  Denies chest pain or SOB      EXAM:   There were no vitals taken for this visit. GENERAL: well developed, well nourished, in no apparent distress  EXTREMITIES:   1. Integument: Normal skin temperature and turgor.  Incurvated right lateral hallux nail border with no acute signs of infection. 2. Vascular: Dorsalis pedis two out of four bilateral and posterior tibial pulses two out of   four bilateral, capillary refill normal.   3. Musculoskeletal: All muscle groups are graded 5 out of 5 in the foot and ankle. Pain on palpation to right lateral hallux nail border. 4. Neurological: Normal sharp dull sensation; reflexes normal.             ASSESSMENT AND PLAN:   Diagnoses and all orders for this visit:    Ingrown toenail        Plan:       Patient seen and examined and findings discussed with patient. Discussed nail avulsion with chemical matricectomy for right hallux ingrown toenail. Patient relates interest in proceeding with  procedure and will schedule a 30-minute appoitment for the procedure. Discussed acute signs of infection and advised patient to seek immediate medical attention if symptoms arise    The patient indicates understanding of these issues and agrees to the plan.         Masoud Madison DPM

## 2023-08-18 DIAGNOSIS — E55.9 VITAMIN D DEFICIENCY: Primary | ICD-10-CM

## 2023-08-20 ENCOUNTER — TELEPHONE (OUTPATIENT)
Facility: CLINIC | Age: 63
End: 2023-08-20

## 2023-08-20 NOTE — TELEPHONE ENCOUNTER
Addended by: JOSUE GURROLA on: 8/16/2018 02:12 PM     Modules accepted: Orders     Methylcobalamin sent as requested.

## 2023-08-28 ENCOUNTER — HOSPITAL ENCOUNTER (OUTPATIENT)
Dept: GENERAL RADIOLOGY | Facility: HOSPITAL | Age: 63
Discharge: HOME | End: 2023-08-28
Attending: ORTHOPAEDIC SURGERY
Payer: MEDICAID

## 2023-08-28 ENCOUNTER — OFFICE VISIT (OUTPATIENT)
Dept: ORTHOPEDICS CLINIC | Facility: CLINIC | Age: 63
End: 2023-08-28

## 2023-08-28 VITALS — HEIGHT: 63 IN | WEIGHT: 140.63 LBS | BODY MASS INDEX: 24.92 KG/M2

## 2023-08-28 DIAGNOSIS — M17.12 PRIMARY OSTEOARTHRITIS OF LEFT KNEE: Primary | ICD-10-CM

## 2023-08-28 DIAGNOSIS — M17.12 PRIMARY OSTEOARTHRITIS OF LEFT KNEE: ICD-10-CM

## 2023-08-28 PROCEDURE — 73562 X-RAY EXAM OF KNEE 3: CPT | Performed by: ORTHOPAEDIC SURGERY

## 2023-09-05 ENCOUNTER — TELEPHONE (OUTPATIENT)
Dept: ORTHOPEDICS CLINIC | Facility: CLINIC | Age: 63
End: 2023-09-05

## 2023-09-05 NOTE — TELEPHONE ENCOUNTER
Type of surgery:  Left total knee replacement   Date: 11/10/23  Location: [facility]  Medical Clearance:      *Medical: Yes      *Dental: Yes      *Other:  Prior Authorization Status: Pending  Workers Comp:  Medacta/Champ: Emailed Javon Isabell: Yes  POV: 12/6/23

## 2023-09-06 ENCOUNTER — TELEPHONE (OUTPATIENT)
Facility: CLINIC | Age: 63
End: 2023-09-06

## 2023-09-06 NOTE — TELEPHONE ENCOUNTER
Pt has knee surgery on 11/10/23 and will need the following (see below) for clearance. Pt is requesting an appt within 30 days prior to her surgery.      EKG  CBC-CMP  Urine analysis  MRSA  Chest X-Ray

## 2023-09-14 ENCOUNTER — OFFICE VISIT (OUTPATIENT)
Dept: PODIATRY CLINIC | Facility: CLINIC | Age: 63
End: 2023-09-14

## 2023-09-14 VITALS — SYSTOLIC BLOOD PRESSURE: 107 MMHG | DIASTOLIC BLOOD PRESSURE: 74 MMHG | HEART RATE: 50 BPM

## 2023-09-14 DIAGNOSIS — L60.0 INGROWN TOENAIL: Primary | ICD-10-CM

## 2023-09-18 ENCOUNTER — HOSPITAL ENCOUNTER (OUTPATIENT)
Dept: MRI IMAGING | Age: 63
Discharge: HOME | End: 2023-09-18
Attending: ORTHOPAEDIC SURGERY
Payer: MEDICAID

## 2023-09-18 DIAGNOSIS — M17.12 PRIMARY OSTEOARTHRITIS OF LEFT KNEE: ICD-10-CM

## 2023-09-20 ENCOUNTER — HOSPITAL ENCOUNTER (OUTPATIENT)
Dept: MRI IMAGING | Facility: HOSPITAL | Age: 63
Discharge: HOME | End: 2023-09-20
Attending: ORTHOPAEDIC SURGERY
Payer: MEDICAID

## 2023-09-20 DIAGNOSIS — M17.12 PRIMARY OSTEOARTHRITIS OF LEFT KNEE: ICD-10-CM

## 2023-09-20 PROCEDURE — 73721 MRI JNT OF LWR EXTRE W/O DYE: CPT | Performed by: ORTHOPAEDIC SURGERY

## 2023-09-28 ENCOUNTER — OFFICE VISIT (OUTPATIENT)
Dept: PODIATRY CLINIC | Facility: CLINIC | Age: 63
End: 2023-09-28

## 2023-09-28 DIAGNOSIS — L60.0 INGROWN TOENAIL: Primary | ICD-10-CM

## 2023-09-28 PROCEDURE — 99213 OFFICE O/P EST LOW 20 MIN: CPT | Performed by: STUDENT IN AN ORGANIZED HEALTH CARE EDUCATION/TRAINING PROGRAM

## 2023-09-28 NOTE — PROGRESS NOTES
[address] [facility]  Progress Note      Iris Finley is a 61year old female. Patient presents with:  Ingrown Toenail: F/u - R hallux - procedure 9/14/23 - Denies pain - no drainage - open to air 4 days            HPI:     Patient is a pleasant 59-year-old female presenting to clinic today for follow-up of right hallux ingrown nail avulsion. Denies any purulent drainage or pain. Allergies: Patient has no known allergies. Current Outpatient Medications   Medication Sig Dispense Refill    Methylcobalamin 1000 MCG Sublingual SL Tab Place 1 tablet under the tongue daily. 90 tablet 1    Cholecalciferol (VITAMIN D3) 1.25 MG (66062 UT) Oral Tab Take 1 tablet by mouth once a week for 12 doses. 12 tablet 0    Meloxicam 15 MG Oral Tab Take 1 tablet (15 mg total) by mouth daily. 30 tablet 0    methocarbamol 750 MG Oral Tab Take 1 tablet (750 mg total) by mouth 3 (three) times daily. 90 tablet 0    gabapentin 300 MG Oral Cap Take 1 capsule (300 mg total) by mouth 3 (three) times daily. 270 capsule 0    Lidocaine Viscous HCl 2 % Mouth/Throat Solution Take 5 mL by mouth every 3 (three) hours as needed for Pain. 100 mL 1    Acetaminophen 500 MG Oral Cap Take 1-2 capsules (500-1,000 mg total) by mouth every 6 (six) hours as needed for Pain. 30 capsule 0    lamoTRIgine 200 MG Oral Tab Take 1 tablet (200 mg total) by mouth daily. acetaminophen 325 MG Oral Tab Take 2 tablets (650 mg total) by mouth every 6 (six) hours. STIMULANT LAXATIVE 8.6-50 MG Oral Tab Take 1 tablet by mouth 2 (two) times daily.       LATUDA 60 MG Oral Tab TK 1 T PO QHS  2    ClonazePAM 2 MG Oral Tab Take 1 tablet 3 x a day        Past Medical History:   Diagnosis Date    Back pain     medical management of pain    Constipation     Depression     Diverticulosis     Gastritis     GERD (gastroesophageal reflux disease)     Headache 2012    Hiatal hernia     History of Helicobacter pylori infection 10/6/2015      Past Surgical History:   Procedure Laterality Date    APPENDECTOMY      BONE REPLACEMENT GRAFT            x2    COLONOSCOPY      D & C      EGD  2012    HYSTERECTOMY      TVH    HYSTEROSCOPY      CARMINE LOCALIZATION WIRE 1 SITE RIGHT (CPT=19281)      OTHER SURGICAL HISTORY      cancer mole removed    OTHER SURGICAL HISTORY      removed ovarires      Family History   Problem Relation Age of Onset    Cancer Father 28        lung ca    Breast Cancer Maternal Grandmother 48        d.55    Cancer Maternal Grandfather 76        colon ca    Cancer Mother 61        non-Hodgkins lymphoma (cause of death)    Colon Cancer Maternal Uncle 61        (cause of death)    Cancer Maternal Uncle 61        Hodgkins lymphoma    Cancer Paternal Aunt         liver (cause of death)    Breast Cancer Paternal Grandmother 70    Cancer Paternal Grandfather 76        colon ca    Ovarian Cancer Paternal Aunt     Breast Cancer Paternal Aunt     Breast Cancer Paternal Cousin Female 39        d.50    Ovarian Cancer Paternal Cousin Female 45        d.45    Cancer Paternal Uncle         colon ca    Cancer Paternal Uncle         brain ca    Cancer Brother 64        Hodgkins    Diabetes Neg     Glaucoma Neg     Macular degeneration Neg       Social History    Socioeconomic History      Marital status: Single    Tobacco Use      Smoking status: Former        Types: Cigarettes        Quit date: 2010        Years since quittin.7      Smokeless tobacco: Never    Vaping Use      Vaping Use: Never used    Substance and Sexual Activity      Alcohol use: No        Alcohol/week: 0.0 standard drinks of alcohol      Drug use: No    Other Topics      Concerns:        Caffeine Concern: Yes          coffee, 3 cups daily        Exercise: No        Pt has a pacemaker: No        Pt has a defibrillator: No        Reaction to local anesthetic: No          REVIEW OF SYSTEMS:     Denies nause, fever, chills  No calf pain  Denies chest pain or SOB      EXAM:   There were no vitals taken for this visit. GENERAL: well developed, well nourished, in no apparent distress  EXTREMITIES:   1. Integument: Normal skin temperature and turgor. S/p partial right medial hallux ingrown nail avulsion with matrixectomy  with no acute signs of infection. 2. Vascular: Dorsalis pedis two out of four bilateral and posterior tibial pulses two out of   four bilateral, capillary refill normal.   3. Musculoskeletal: All muscle groups are graded 5 out of 5 in the foot and ankle. No pain with palpation to right hallux. 4. Neurological: Normal sharp dull sensation; reflexes normal.      MRI KNEE, LEFT (JZT=10183)    Result Date: 9/20/2023  CONCLUSION:  Stable left knee joint osteoarthritis with greatest/severe involvement of the medial compartment. Dictated by (CST): Jaydon Singh MD on 9/20/2023 at 12:18 PM     Finalized by (CST): Jaydon Singh MD on 9/20/2023 at 12:22 PM            ASSESSMENT AND PLAN:   There are no diagnoses linked to this encounter. Plan:         Pt seen and examined. Findngs discussed with pt   Avulsion site is healing well with no acute signs of infection. Discontinue foot soaks and dressing changes. Educated pt on pedal hygiene. Advised of acute signs of infection and instructed to seek immediate medical attention if symptoms arise. The patient indicates understanding of these issues and agrees to the plan.         Naz Escalona DPM

## 2023-10-18 ENCOUNTER — LAB ENCOUNTER (OUTPATIENT)
Dept: LAB | Facility: REFERENCE LAB | Age: 63
End: 2023-10-18
Attending: FAMILY MEDICINE
Payer: MEDICAID

## 2023-10-18 ENCOUNTER — LAB ENCOUNTER (OUTPATIENT)
Dept: LAB | Age: 63
End: 2023-10-18
Attending: FAMILY MEDICINE
Payer: MEDICAID

## 2023-10-18 ENCOUNTER — HOSPITAL ENCOUNTER (OUTPATIENT)
Dept: GENERAL RADIOLOGY | Age: 63
Discharge: HOME | End: 2023-10-18
Attending: FAMILY MEDICINE
Payer: MEDICAID

## 2023-10-18 ENCOUNTER — OFFICE VISIT (OUTPATIENT)
Facility: CLINIC | Age: 63
End: 2023-10-18
Payer: MEDICAID

## 2023-10-18 VITALS
DIASTOLIC BLOOD PRESSURE: 78 MMHG | BODY MASS INDEX: 25.69 KG/M2 | HEART RATE: 67 BPM | OXYGEN SATURATION: 98 % | SYSTOLIC BLOOD PRESSURE: 122 MMHG | HEIGHT: 63 IN | WEIGHT: 145 LBS

## 2023-10-18 DIAGNOSIS — Z01.818 PREOP EXAMINATION: ICD-10-CM

## 2023-10-18 DIAGNOSIS — E55.9 VITAMIN D DEFICIENCY: ICD-10-CM

## 2023-10-18 DIAGNOSIS — Z23 FLU VACCINE NEED: ICD-10-CM

## 2023-10-18 DIAGNOSIS — F31.60 MIXED BIPOLAR I DISORDER (HCC): ICD-10-CM

## 2023-10-18 DIAGNOSIS — M17.12 PRIMARY OSTEOARTHRITIS OF LEFT KNEE: ICD-10-CM

## 2023-10-18 DIAGNOSIS — M47.816 LUMBAR SPONDYLOSIS: ICD-10-CM

## 2023-10-18 DIAGNOSIS — Z01.818 PREOP EXAMINATION: Primary | ICD-10-CM

## 2023-10-18 DIAGNOSIS — M47.26 OSTEOARTHRITIS OF SPINE WITH RADICULOPATHY, LUMBAR REGION: ICD-10-CM

## 2023-10-18 LAB
ALBUMIN SERPL-MCNC: 3.4 G/DL (ref 3.4–5)
ALBUMIN/GLOB SERPL: 0.9 {RATIO} (ref 1–2)
ALP LIVER SERPL-CCNC: 57 U/L
ALT SERPL-CCNC: 21 U/L
ANION GAP SERPL CALC-SCNC: 7 MMOL/L (ref 0–18)
AST SERPL-CCNC: 11 U/L (ref 15–37)
ATRIAL RATE: 71 BPM
BASOPHILS # BLD AUTO: 0.05 X10(3) UL (ref 0–0.2)
BASOPHILS NFR BLD AUTO: 0.5 %
BILIRUB SERPL-MCNC: 0.4 MG/DL (ref 0.1–2)
BILIRUB UR QL: NEGATIVE
BUN BLD-MCNC: 16 MG/DL (ref 7–18)
BUN/CREAT SERPL: 20.8 (ref 10–20)
CALCIUM BLD-MCNC: 9 MG/DL (ref 8.5–10.1)
CHLORIDE SERPL-SCNC: 110 MMOL/L (ref 98–112)
CLARITY UR: CLEAR
CO2 SERPL-SCNC: 25 MMOL/L (ref 21–32)
CREAT BLD-MCNC: 0.77 MG/DL
DEPRECATED RDW RBC AUTO: 44.7 FL (ref 35.1–46.3)
EGFRCR SERPLBLD CKD-EPI 2021: 87 ML/MIN/1.73M2 (ref 60–?)
EOSINOPHIL # BLD AUTO: 0.09 X10(3) UL (ref 0–0.7)
EOSINOPHIL NFR BLD AUTO: 1 %
ERYTHROCYTE [DISTWIDTH] IN BLOOD BY AUTOMATED COUNT: 13.1 % (ref 11–15)
FASTING STATUS PATIENT QL REPORTED: YES
GLOBULIN PLAS-MCNC: 3.6 G/DL (ref 2.8–4.4)
GLUCOSE BLD-MCNC: 90 MG/DL (ref 70–99)
GLUCOSE UR-MCNC: NORMAL MG/DL
HCT VFR BLD AUTO: 41.7 %
HGB BLD-MCNC: 13.5 G/DL
HGB UR QL STRIP.AUTO: NEGATIVE
IMM GRANULOCYTES # BLD AUTO: 0.04 X10(3) UL (ref 0–1)
IMM GRANULOCYTES NFR BLD: 0.4 %
KETONES UR-MCNC: NEGATIVE MG/DL
LEUKOCYTE ESTERASE UR QL STRIP.AUTO: NEGATIVE
LYMPHOCYTES # BLD AUTO: 1.69 X10(3) UL (ref 1–4)
LYMPHOCYTES NFR BLD AUTO: 18.1 %
MCH RBC QN AUTO: 30.1 PG (ref 26–34)
MCHC RBC AUTO-ENTMCNC: 32.4 G/DL (ref 31–37)
MCV RBC AUTO: 92.9 FL
MONOCYTES # BLD AUTO: 0.6 X10(3) UL (ref 0.1–1)
MONOCYTES NFR BLD AUTO: 6.4 %
MRSA DNA SPEC QL NAA+PROBE: NEGATIVE
NEUTROPHILS # BLD AUTO: 6.87 X10 (3) UL (ref 1.5–7.7)
NEUTROPHILS # BLD AUTO: 6.87 X10(3) UL (ref 1.5–7.7)
NEUTROPHILS NFR BLD AUTO: 73.6 %
NITRITE UR QL STRIP.AUTO: NEGATIVE
OSMOLALITY SERPL CALC.SUM OF ELEC: 295 MOSM/KG (ref 275–295)
P AXIS: 72 DEGREES
P-R INTERVAL: 140 MS
PH UR: 5.5 [PH] (ref 5–8)
PLATELET # BLD AUTO: 368 10(3)UL (ref 150–450)
POTASSIUM SERPL-SCNC: 4.1 MMOL/L (ref 3.5–5.1)
PROT SERPL-MCNC: 7 G/DL (ref 6.4–8.2)
PROT UR-MCNC: NEGATIVE MG/DL
Q-T INTERVAL: 382 MS
QRS DURATION: 72 MS
QTC CALCULATION (BEZET): 415 MS
R AXIS: 37 DEGREES
RBC # BLD AUTO: 4.49 X10(6)UL
SODIUM SERPL-SCNC: 142 MMOL/L (ref 136–145)
SP GR UR STRIP: 1.02 (ref 1–1.03)
T AXIS: 54 DEGREES
UROBILINOGEN UR STRIP-ACNC: NORMAL
VENTRICULAR RATE: 71 BPM
VIT D+METAB SERPL-MCNC: 29.1 NG/ML (ref 30–100)
WBC # BLD AUTO: 9.3 X10(3) UL (ref 4–11)

## 2023-10-18 PROCEDURE — 3008F BODY MASS INDEX DOCD: CPT | Performed by: FAMILY MEDICINE

## 2023-10-18 PROCEDURE — 82306 VITAMIN D 25 HYDROXY: CPT

## 2023-10-18 PROCEDURE — 90686 IIV4 VACC NO PRSV 0.5 ML IM: CPT | Performed by: FAMILY MEDICINE

## 2023-10-18 PROCEDURE — 93010 ELECTROCARDIOGRAM REPORT: CPT | Performed by: INTERNAL MEDICINE

## 2023-10-18 PROCEDURE — 90471 IMMUNIZATION ADMIN: CPT | Performed by: FAMILY MEDICINE

## 2023-10-18 PROCEDURE — 99214 OFFICE O/P EST MOD 30 MIN: CPT | Performed by: FAMILY MEDICINE

## 2023-10-18 PROCEDURE — 81003 URINALYSIS AUTO W/O SCOPE: CPT

## 2023-10-18 PROCEDURE — 71046 X-RAY EXAM CHEST 2 VIEWS: CPT | Performed by: FAMILY MEDICINE

## 2023-10-18 PROCEDURE — 3078F DIAST BP <80 MM HG: CPT | Performed by: FAMILY MEDICINE

## 2023-10-18 PROCEDURE — 36415 COLL VENOUS BLD VENIPUNCTURE: CPT

## 2023-10-18 PROCEDURE — 93005 ELECTROCARDIOGRAM TRACING: CPT

## 2023-10-18 PROCEDURE — 80053 COMPREHEN METABOLIC PANEL: CPT

## 2023-10-18 PROCEDURE — 3074F SYST BP LT 130 MM HG: CPT | Performed by: FAMILY MEDICINE

## 2023-10-18 PROCEDURE — 85025 COMPLETE CBC W/AUTO DIFF WBC: CPT

## 2023-10-18 PROCEDURE — 87641 MR-STAPH DNA AMP PROBE: CPT

## 2023-10-18 RX ORDER — MELOXICAM 15 MG/1
15 TABLET ORAL DAILY
Qty: 30 TABLET | Refills: 0 | Status: SHIPPED | OUTPATIENT
Start: 2023-10-18

## 2023-10-22 DIAGNOSIS — M43.9 COMPRESSION DEFORMITY OF VERTEBRA: Primary | ICD-10-CM

## 2023-10-25 ENCOUNTER — TELEPHONE (OUTPATIENT)
Dept: ORTHOPEDICS CLINIC | Facility: CLINIC | Age: 63
End: 2023-10-25

## 2023-10-25 NOTE — TELEPHONE ENCOUNTER
Patient cancelled her pre-surgical appointment today at 10:00 due to transportation issue. Patient informed no openings prior to her scheduled surgery. Patient asking for next Monday or Tuesday so she scan arrange transportation. Please call at [phone],IV.

## 2023-10-25 NOTE — TELEPHONE ENCOUNTER
Patient has left TKA scheduled for 11/10/23- Patient missed pre-op visit because of transportation issues. Would we be able to add on for next Wednesday for pre-op visit.      Please advise

## 2023-10-25 NOTE — TELEPHONE ENCOUNTER
S/narciso carlisle and offered presurgical visit with Brando Bush on 11/1/23 at 3:30.  No other questions at the moment

## 2023-10-26 ENCOUNTER — TELEPHONE (OUTPATIENT)
Facility: CLINIC | Age: 63
End: 2023-10-26

## 2023-10-26 DIAGNOSIS — Z13.820 SCREENING FOR OSTEOPOROSIS: Primary | ICD-10-CM

## 2023-10-26 DIAGNOSIS — Z96.652 HISTORY OF TOTAL KNEE REPLACEMENT, LEFT: Primary | ICD-10-CM

## 2023-10-26 DIAGNOSIS — E55.9 VITAMIN D DEFICIENCY: Primary | ICD-10-CM

## 2023-10-26 DIAGNOSIS — Z12.31 SCREENING MAMMOGRAM, ENCOUNTER FOR: ICD-10-CM

## 2023-10-26 PROBLEM — M48.56XA NON-TRAUMATIC COMPRESSION FRACTURE OF FIRST LUMBAR VERTEBRA (HCC): Status: ACTIVE | Noted: 2023-10-26

## 2023-10-26 PROBLEM — S22.070A COMPRESSION FRACTURE OF T9 VERTEBRA (HCC): Status: ACTIVE | Noted: 2023-10-26

## 2023-10-26 NOTE — TELEPHONE ENCOUNTER
Patient calling in requesting an order for a cane and a shower chair. States she will need both do to her knee surgery on 11/10. They would like the order to be sent to the following (see below). Patient states they need a picture of both sides of the insurance card as well. She would like a call back when it is sent into the pharmacy.     [address]   Fax [phone]  Ph: [phone]

## 2023-10-26 NOTE — TELEPHONE ENCOUNTER
Pt is requesting a Bone Density scan for pt. Per scheduling they need a new order. Patient is requesting this to be done as soon as possible as she is trying to schedule and complete before she goes into surgery on 11/10. They are requesting a call when the order is in.

## 2023-11-01 ENCOUNTER — OFFICE VISIT (OUTPATIENT)
Dept: ORTHOPEDICS CLINIC | Facility: CLINIC | Age: 63
End: 2023-11-01

## 2023-11-01 DIAGNOSIS — M17.12 PRIMARY OSTEOARTHRITIS OF LEFT KNEE: Primary | ICD-10-CM

## 2023-11-01 PROCEDURE — 99213 OFFICE O/P EST LOW 20 MIN: CPT | Performed by: PHYSICIAN ASSISTANT

## 2023-11-01 NOTE — PROGRESS NOTES
NURSING INTAKE COMMENTS: Patient presents with: Follow - Up: F/u presurgical visit left knee 8/10, Sx schedule 11/10/23      HPI: This 61year old female presents today for preoperative consultation. She is scheduled for a left total knee arthroplasty next week. She is anxious to proceed with surgery. She has a lot of pain in the knee. It is making it difficult for her to sleep and ambulate short distances. Past Medical History:   Diagnosis Date    Back pain     medical management of pain    Constipation     Depression     Diverticulosis     Gastritis     GERD (gastroesophageal reflux disease)     Headache 2012    Hiatal hernia     History of Helicobacter pylori infection 10/6/2015     Past Surgical History:   Procedure Laterality Date    APPENDECTOMY      BONE REPLACEMENT GRAFT            x2    COLONOSCOPY  2012    D & C  2000    EGD      HYSTERECTOMY      TVH    HYSTEROSCOPY      CARMINE LOCALIZATION WIRE 1 SITE RIGHT (CPT=19281)      OTHER SURGICAL HISTORY      cancer mole removed    OTHER SURGICAL HISTORY      removed ovarires     Current Outpatient Medications   Medication Sig Dispense Refill    Cholecalciferol (VITAMIN D3) 1.25 MG (68886 UT) Oral Tab Take 1 tablet by mouth once a week for 12 doses. 12 tablet 0    Meloxicam 15 MG Oral Tab Take 1 tablet (15 mg total) by mouth daily. 30 tablet 0    Methylcobalamin 1000 MCG Sublingual SL Tab Place 1 tablet under the tongue daily. 90 tablet 1    methocarbamol 750 MG Oral Tab Take 1 tablet (750 mg total) by mouth 3 (three) times daily. 90 tablet 0    Lidocaine Viscous HCl 2 % Mouth/Throat Solution Take 5 mL by mouth every 3 (three) hours as needed for Pain. 100 mL 1    Acetaminophen 500 MG Oral Cap Take 1-2 capsules (500-1,000 mg total) by mouth every 6 (six) hours as needed for Pain. 30 capsule 0    lamoTRIgine 200 MG Oral Tab Take 1 tablet (200 mg total) by mouth daily.       STIMULANT LAXATIVE 8.6-50 MG Oral Tab Take 1 tablet by mouth 2 (two) times daily.       LATUDA 60 MG Oral Tab TK 1 T PO QHS  2    ClonazePAM 2 MG Oral Tab Take 1 tablet 3 x a day       No Known Allergies  Family History   Problem Relation Age of Onset    Cancer Father 28        lung ca    Breast Cancer Maternal Grandmother 48        d.55    Cancer Maternal Grandfather 76        colon ca    Cancer Mother 61        non-Hodgkins lymphoma (cause of death)    Colon Cancer Maternal Uncle 61        (cause of death)    Cancer Maternal Uncle 61        Hodgkins lymphoma    Cancer Paternal Aunt         liver (cause of death)    Breast Cancer Paternal Grandmother 70    Cancer Paternal Grandfather 76        colon ca    Ovarian Cancer Paternal Aunt     Breast Cancer Paternal Aunt     Breast Cancer Paternal Cousin Female 39        d.50    Ovarian Cancer Paternal Cousin Female 45        d.45    Cancer Paternal Uncle         colon ca    Cancer Paternal Uncle         brain ca    Cancer Brother 64        Hodgkins    Diabetes Neg     Glaucoma Neg     Macular degeneration Neg        Social History    Occupational History      Not on file    Tobacco Use      Smoking status: Former        Types: Cigarettes        Quit date: 2010        Years since quittin.8      Smokeless tobacco: Never    Vaping Use      Vaping Use: Never used    Substance and Sexual Activity      Alcohol use: No        Alcohol/week: 0.0 standard drinks of alcohol      Drug use: No      Sexual activity: Not on file       Review of Systems:  GENERAL: feels generally well, no significant weight loss or weight gain  SKIN: no ulcerated or worrisome skin lesions  EYES:denies blurred vision or double vision  HEENT: denies new nasal congestion, sinus pain or ST  LUNGS: denies shortness of breath  CARDIOVASCULAR: denies chest pain  GI: no hematemesis, no worsening heartburn, no diarrhea  : no dysuria, no blood in urine, no difficulty urinating, no incontinence  MUSCULOSKELETAL: no other musculoskeletal complaints other than in HPI  NEURO: no numbness or tingling, no weakness or balance disorder  PSYCHE: no depression or anxiety  HEMATOLOGIC: no hx of blood dyscrasia  ENDOCRINE: no thyroid or diabetes issues  ALL/ASTHMA: no new hx of severe allergy or asthma    Physical Examination:    There were no vitals taken for this visit. Constitutional: appears well hydrated, alert and responsive, no acute distress noted  Extremities: Her exam is unchanged. Musculoskeletal:  Large effusion in the left knee. Crepitus and pain with range of motion. No instability. Lab Results   Component Value Date    WBC 9.3 10/18/2023    HGB 13.5 10/18/2023    .0 10/18/2023      Lab Results   Component Value Date    GLU 90 10/18/2023    BUN 16 10/18/2023    CREATSERUM 0.77 10/18/2023    GFRNAA 65 01/27/2020    GFRAA 75 01/27/2020        Assessment and Plan:  Diagnoses and all orders for this visit:    Primary osteoarthritis of left knee          Plan: Ms. Joe Roberts is scheduled for a left total knee arthroplasty next week. We discussed the surgery and the expected risks and benefits. I answered all of her questions. We discussed the expected recovery time. She has been cleared medically. We will proceed with surgery as scheduled. I had her make a 3-week postop visit. Advised her to call if any questions or problems arise in the meantime. The above note was creating using Dragon speech recognition technology. Please excuse any typos. This visit was performed under the supervision of Dr. Chang [facility] who formulated the treatment plan and decision making.

## 2023-11-08 RX ORDER — ASPIRIN 81 MG/1
81 TABLET ORAL DAILY
COMMUNITY
End: 2023-11-11

## 2023-11-09 NOTE — H&P
[address] Patient Status:  Outpatient in a Bed    1960 MRN [id number]   Location [place] [address] Attending Juana Baer, *   Hosp Day # 0 PCP Humberto Estimable, DO     Date:  2023  Date of Admission:  (Not on file)    History provided by:patient  Chief Complaint:   Left knee pain    HPI:   Isauro Mendoza is a(n) 61year old female. She presents with complaints of severe left knee pain that has not improved with conservative management. She reports that her last cortisone injection actually made the pain worse.   She has had her infected dental implants removed and wants to schedule knee replacement     History     Past Medical History:   Diagnosis Date    Anxiety state     Back pain     medical management of pain    Back problem     Cataract     Constipation     Depression     Diverticulosis     Gastritis     GERD (gastroesophageal reflux disease)     Headache 2012    Hiatal hernia     History of Helicobacter pylori infection 10/06/2015    Migraines     Osteoarthritis     Visual impairment     glasses     Past Surgical History:   Procedure Laterality Date    APPENDECTOMY      BONE REPLACEMENT GRAFT            x2    COLONOSCOPY      D & C      EGD      HYSTERECTOMY      TVH    HYSTEROSCOPY      CARMINE LOCALIZATION WIRE 1 SITE RIGHT (CPT=19281)      OTHER SURGICAL HISTORY      cancer mole removed    OTHER SURGICAL HISTORY      removed ovarires     Family History   Problem Relation Age of Onset    Cancer Father 28        lung ca    Breast Cancer Maternal Grandmother 48        d.55    Cancer Maternal Grandfather 76        colon ca    Cancer Mother 61        non-Hodgkins lymphoma (cause of death)    Colon Cancer Maternal Uncle 61        (cause of death)    Cancer Maternal Uncle 61        Hodgkins lymphoma    Cancer Paternal Aunt         liver (cause of death)    Breast Cancer Paternal Grandmother 70    Cancer Paternal Grandfather 76        colon ca    Ovarian Cancer Paternal Aunt     Breast Cancer Paternal Aunt     Breast Cancer Paternal Cousin Female 39        d.50    Ovarian Cancer Paternal Cousin Female 45        d.45    Cancer Paternal Uncle         colon ca    Cancer Paternal Uncle         brain ca    Cancer Brother 64        Hodgkins    Diabetes Neg     Glaucoma Neg     Macular degeneration Neg      Social History:  Social History     Socioeconomic History    Marital status: Single   Tobacco Use    Smoking status: Former     Types: Cigarettes     Quit date: 2010     Years since quittin.8    Smokeless tobacco: Never   Vaping Use    Vaping Use: Never used   Substance and Sexual Activity    Alcohol use: No     Alcohol/week: 0.0 standard drinks of alcohol    Drug use: No   Other Topics Concern    Caffeine Concern Yes     Comment: coffee, 3 cups daily    Exercise No    Pt has a pacemaker No    Pt has a defibrillator No    Reaction to local anesthetic No     Allergies/Medications: Allergies: No Known Allergies  No medications prior to admission. Review of Systems:   Pertinent items are noted in HPI. Physical Exam:   Vital Signs:  Height 5' 3\" (1.6 m), weight 150 lb (68 kg). General appearance: alert, appears stated age and cooperative  Extremities: Large effusion in the left knee. Crepitus and pain with range of motion. No instability.        Results:     Lab Results   Component Value Date    WBC 9.3 10/18/2023    HGB 13.5 10/18/2023    HCT 41.7 10/18/2023    .0 10/18/2023    CREATSERUM 0.77 10/18/2023    BUN 16 10/18/2023     10/18/2023    K 4.1 10/18/2023     10/18/2023    CO2 25.0 10/18/2023    GLU 90 10/18/2023    CA 9.0 10/18/2023    ALB 3.4 10/18/2023    ALKPHO 57 10/18/2023    BILT 0.4 10/18/2023    TP 7.0 10/18/2023    AST 11 (L) 10/18/2023    ALT 21 10/18/2023    T4F 1.0 2019    TSH 2.120 2020    CRP <0.5 2015    B12 304 2023     Imaging: Advanced osteoarthritis of the left knee, medial compartment predominant. No results found. Assessment/Plan:       Assessment: She has advanced osteoarthritis of the left knee that has not responded to conservative management. I discussed left total knee replacement. We discussed the risks and indications for surgery as well as the common possible complications. Our discussion included, but was not limited to the potential risks of anesthetic complication, infection, bleeding, DVT or PE, nerve or blood vessel injury, stiffness, the potential need for revision surgery, leg length inequality, as well as the potential risk of unanticipated perioperative medical or orthopedic complications. She would like to proceed. She has received medical clearance.       Lyubov Ratliff PA-C  11/9/2023

## 2023-11-10 ENCOUNTER — APPOINTMENT (OUTPATIENT)
Dept: GENERAL RADIOLOGY | Facility: HOSPITAL | Age: 63
End: 2023-11-10
Attending: PHYSICIAN ASSISTANT
Payer: MEDICAID

## 2023-11-10 ENCOUNTER — HOSPITAL ENCOUNTER (OUTPATIENT)
Facility: HOSPITAL | Age: 63
Discharge: HOME HEALTH | End: 2023-11-11
Attending: ORTHOPAEDIC SURGERY | Admitting: ORTHOPAEDIC SURGERY
Payer: MEDICAID

## 2023-11-10 ENCOUNTER — ANESTHESIA (OUTPATIENT)
Dept: SURGERY | Facility: HOSPITAL | Age: 63
End: 2023-11-10
Payer: MEDICAID

## 2023-11-10 ENCOUNTER — ANESTHESIA EVENT (OUTPATIENT)
Dept: SURGERY | Facility: HOSPITAL | Age: 63
End: 2023-11-10
Payer: MEDICAID

## 2023-11-10 DIAGNOSIS — M17.12 PRIMARY OSTEOARTHRITIS OF LEFT KNEE: ICD-10-CM

## 2023-11-10 PROBLEM — F31.9 BIPOLAR AFFECTIVE DISORDER (HCC): Status: ACTIVE | Noted: 2023-11-10

## 2023-11-10 LAB
HCT VFR BLD AUTO: 36.4 %
HGB BLD-MCNC: 11.5 G/DL

## 2023-11-10 PROCEDURE — 99222 1ST HOSP IP/OBS MODERATE 55: CPT | Performed by: HOSPITALIST

## 2023-11-10 PROCEDURE — 0SRD0J9 REPLACEMENT OF LEFT KNEE JOINT WITH SYNTHETIC SUBSTITUTE, CEMENTED, OPEN APPROACH: ICD-10-PCS | Performed by: ORTHOPAEDIC SURGERY

## 2023-11-10 PROCEDURE — 3078F DIAST BP <80 MM HG: CPT | Performed by: HOSPITALIST

## 2023-11-10 PROCEDURE — 3008F BODY MASS INDEX DOCD: CPT | Performed by: HOSPITALIST

## 2023-11-10 PROCEDURE — 3074F SYST BP LT 130 MM HG: CPT | Performed by: HOSPITALIST

## 2023-11-10 PROCEDURE — 73560 X-RAY EXAM OF KNEE 1 OR 2: CPT | Performed by: PHYSICIAN ASSISTANT

## 2023-11-10 DEVICE — MY KNEE: Type: IMPLANTABLE DEVICE

## 2023-11-10 DEVICE — GMK SPHERE KNEE: Type: IMPLANTABLE DEVICE | Site: KNEE

## 2023-11-10 DEVICE — TIBIAL TRAY FIXED CEMENTED SIZE T3-I4 LEFT
Type: IMPLANTABLE DEVICE | Site: KNEE | Status: FUNCTIONAL
Brand: GMK SPHERE TOTAL KNEE SYSTEM

## 2023-11-10 DEVICE — RESURFACING PATELLA SIZE 2
Type: IMPLANTABLE DEVICE | Site: KNEE | Status: FUNCTIONAL
Brand: GMK PRIMARY TOTAL KNEE SYSTEM

## 2023-11-10 DEVICE — FEMUR SPHERE CEMENTED LEFT, SIZE 4
Type: IMPLANTABLE DEVICE | Site: KNEE | Status: FUNCTIONAL
Brand: GMK SPHERE TOTAL KNEE SYSTEM

## 2023-11-10 DEVICE — TIBIAL INSERT FIXED SPHERE FLEX   #4/10 MM L E-CROSS
Type: IMPLANTABLE DEVICE | Site: KNEE | Status: FUNCTIONAL
Brand: GMK SPHERE TOTAL KNEE SYSTEM

## 2023-11-10 DEVICE — IMPLANTABLE DEVICE
Type: IMPLANTABLE DEVICE | Site: KNEE | Status: FUNCTIONAL
Brand: REFOBACIN® BONE CEMENT R

## 2023-11-10 RX ORDER — MORPHINE SULFATE 4 MG/ML
4 INJECTION, SOLUTION INTRAMUSCULAR; INTRAVENOUS EVERY 10 MIN PRN
Status: DISCONTINUED | OUTPATIENT
Start: 2023-11-10 | End: 2023-11-10 | Stop reason: HOSPADM

## 2023-11-10 RX ORDER — ONDANSETRON 2 MG/ML
4 INJECTION INTRAMUSCULAR; INTRAVENOUS EVERY 6 HOURS PRN
Status: DISCONTINUED | OUTPATIENT
Start: 2023-11-10 | End: 2023-11-11

## 2023-11-10 RX ORDER — LURASIDONE HYDROCHLORIDE 20 MG/1
60 TABLET, FILM COATED ORAL NIGHTLY
Status: DISCONTINUED | OUTPATIENT
Start: 2023-11-11 | End: 2023-11-10

## 2023-11-10 RX ORDER — DOCUSATE SODIUM 100 MG/1
100 CAPSULE, LIQUID FILLED ORAL 2 TIMES DAILY
Status: DISCONTINUED | OUTPATIENT
Start: 2023-11-10 | End: 2023-11-11

## 2023-11-10 RX ORDER — METHOCARBAMOL 750 MG/1
750 TABLET, FILM COATED ORAL 3 TIMES DAILY PRN
Status: DISCONTINUED | OUTPATIENT
Start: 2023-11-10 | End: 2023-11-11

## 2023-11-10 RX ORDER — HYDROMORPHONE HYDROCHLORIDE 1 MG/ML
0.4 INJECTION, SOLUTION INTRAMUSCULAR; INTRAVENOUS; SUBCUTANEOUS EVERY 2 HOUR PRN
Status: DISCONTINUED | OUTPATIENT
Start: 2023-11-10 | End: 2023-11-11

## 2023-11-10 RX ORDER — ASPIRIN 325 MG
325 TABLET, DELAYED RELEASE (ENTERIC COATED) ORAL 2 TIMES DAILY
Status: DISCONTINUED | OUTPATIENT
Start: 2023-11-10 | End: 2023-11-11

## 2023-11-10 RX ORDER — LAMOTRIGINE 200 MG/1
200 TABLET ORAL DAILY
Status: DISCONTINUED | OUTPATIENT
Start: 2023-11-10 | End: 2023-11-10

## 2023-11-10 RX ORDER — CEFAZOLIN SODIUM/WATER 2 G/20 ML
2 SYRINGE (ML) INTRAVENOUS ONCE
Status: COMPLETED | OUTPATIENT
Start: 2023-11-10 | End: 2023-11-10

## 2023-11-10 RX ORDER — BISACODYL 10 MG
10 SUPPOSITORY, RECTAL RECTAL
Status: DISCONTINUED | OUTPATIENT
Start: 2023-11-10 | End: 2023-11-11

## 2023-11-10 RX ORDER — BUPIVACAINE HYDROCHLORIDE AND EPINEPHRINE 5; 5 MG/ML; UG/ML
INJECTION, SOLUTION PERINEURAL AS NEEDED
Status: DISCONTINUED | OUTPATIENT
Start: 2023-11-10 | End: 2023-11-10 | Stop reason: HOSPADM

## 2023-11-10 RX ORDER — HYDROCODONE BITARTRATE AND ACETAMINOPHEN 7.5; 325 MG/1; MG/1
1 TABLET ORAL EVERY 6 HOURS PRN
Status: DISCONTINUED | OUTPATIENT
Start: 2023-11-10 | End: 2023-11-10

## 2023-11-10 RX ORDER — DEXAMETHASONE SODIUM PHOSPHATE 10 MG/ML
INJECTION, SOLUTION INTRAMUSCULAR; INTRAVENOUS
Status: DISCONTINUED | OUTPATIENT
Start: 2023-11-10 | End: 2023-11-10 | Stop reason: SURG

## 2023-11-10 RX ORDER — HYDROMORPHONE HYDROCHLORIDE 1 MG/ML
0.6 INJECTION, SOLUTION INTRAMUSCULAR; INTRAVENOUS; SUBCUTANEOUS EVERY 5 MIN PRN
Status: DISCONTINUED | OUTPATIENT
Start: 2023-11-10 | End: 2023-11-10 | Stop reason: HOSPADM

## 2023-11-10 RX ORDER — DIPHENHYDRAMINE HYDROCHLORIDE 50 MG/ML
12.5 INJECTION INTRAMUSCULAR; INTRAVENOUS EVERY 4 HOURS PRN
Status: DISCONTINUED | OUTPATIENT
Start: 2023-11-10 | End: 2023-11-11

## 2023-11-10 RX ORDER — MELATONIN
325
Status: DISCONTINUED | OUTPATIENT
Start: 2023-11-10 | End: 2023-11-11

## 2023-11-10 RX ORDER — DIPHENHYDRAMINE HYDROCHLORIDE 50 MG/ML
25 INJECTION INTRAMUSCULAR; INTRAVENOUS ONCE AS NEEDED
Status: ACTIVE | OUTPATIENT
Start: 2023-11-10 | End: 2023-11-10

## 2023-11-10 RX ORDER — SENNOSIDES 8.6 MG
17.2 TABLET ORAL NIGHTLY
Status: DISCONTINUED | OUTPATIENT
Start: 2023-11-10 | End: 2023-11-11

## 2023-11-10 RX ORDER — ONDANSETRON 2 MG/ML
4 INJECTION INTRAMUSCULAR; INTRAVENOUS EVERY 6 HOURS PRN
Status: DISCONTINUED | OUTPATIENT
Start: 2023-11-10 | End: 2023-11-10 | Stop reason: HOSPADM

## 2023-11-10 RX ORDER — HYDROMORPHONE HYDROCHLORIDE 1 MG/ML
0.2 INJECTION, SOLUTION INTRAMUSCULAR; INTRAVENOUS; SUBCUTANEOUS EVERY 5 MIN PRN
Status: DISCONTINUED | OUTPATIENT
Start: 2023-11-10 | End: 2023-11-10 | Stop reason: HOSPADM

## 2023-11-10 RX ORDER — SODIUM CHLORIDE, SODIUM LACTATE, POTASSIUM CHLORIDE, CALCIUM CHLORIDE 600; 310; 30; 20 MG/100ML; MG/100ML; MG/100ML; MG/100ML
INJECTION, SOLUTION INTRAVENOUS CONTINUOUS
Status: DISCONTINUED | OUTPATIENT
Start: 2023-11-10 | End: 2023-11-10 | Stop reason: HOSPADM

## 2023-11-10 RX ORDER — MIDAZOLAM HYDROCHLORIDE 1 MG/ML
INJECTION INTRAMUSCULAR; INTRAVENOUS AS NEEDED
Status: DISCONTINUED | OUTPATIENT
Start: 2023-11-10 | End: 2023-11-10 | Stop reason: SURG

## 2023-11-10 RX ORDER — NALOXONE HYDROCHLORIDE 0.4 MG/ML
0.08 INJECTION, SOLUTION INTRAMUSCULAR; INTRAVENOUS; SUBCUTANEOUS AS NEEDED
Status: DISCONTINUED | OUTPATIENT
Start: 2023-11-10 | End: 2023-11-10 | Stop reason: HOSPADM

## 2023-11-10 RX ORDER — ACETAMINOPHEN 500 MG
1000 TABLET ORAL ONCE
Status: COMPLETED | OUTPATIENT
Start: 2023-11-10 | End: 2023-11-10

## 2023-11-10 RX ORDER — DEXAMETHASONE SODIUM PHOSPHATE 4 MG/ML
VIAL (ML) INJECTION AS NEEDED
Status: DISCONTINUED | OUTPATIENT
Start: 2023-11-10 | End: 2023-11-10 | Stop reason: SURG

## 2023-11-10 RX ORDER — PSEUDOEPHEDRINE HCL 30 MG
100 TABLET ORAL 2 TIMES DAILY
Qty: 20 CAPSULE | Refills: 0 | Status: SHIPPED | OUTPATIENT
Start: 2023-11-10

## 2023-11-10 RX ORDER — SODIUM CHLORIDE, SODIUM LACTATE, POTASSIUM CHLORIDE, CALCIUM CHLORIDE 600; 310; 30; 20 MG/100ML; MG/100ML; MG/100ML; MG/100ML
INJECTION, SOLUTION INTRAVENOUS CONTINUOUS
Status: DISCONTINUED | OUTPATIENT
Start: 2023-11-10 | End: 2023-11-11

## 2023-11-10 RX ORDER — MORPHINE SULFATE 10 MG/ML
6 INJECTION, SOLUTION INTRAMUSCULAR; INTRAVENOUS EVERY 10 MIN PRN
Status: DISCONTINUED | OUTPATIENT
Start: 2023-11-10 | End: 2023-11-10 | Stop reason: HOSPADM

## 2023-11-10 RX ORDER — HYDROMORPHONE HYDROCHLORIDE 1 MG/ML
0.4 INJECTION, SOLUTION INTRAMUSCULAR; INTRAVENOUS; SUBCUTANEOUS EVERY 5 MIN PRN
Status: DISCONTINUED | OUTPATIENT
Start: 2023-11-10 | End: 2023-11-10 | Stop reason: HOSPADM

## 2023-11-10 RX ORDER — DIPHENHYDRAMINE HCL 25 MG
25 CAPSULE ORAL EVERY 4 HOURS PRN
Status: DISCONTINUED | OUTPATIENT
Start: 2023-11-10 | End: 2023-11-11

## 2023-11-10 RX ORDER — FAMOTIDINE 20 MG/1
20 TABLET, FILM COATED ORAL 2 TIMES DAILY
Status: DISCONTINUED | OUTPATIENT
Start: 2023-11-10 | End: 2023-11-11

## 2023-11-10 RX ORDER — ENEMA 19; 7 G/133ML; G/133ML
1 ENEMA RECTAL ONCE AS NEEDED
Status: DISCONTINUED | OUTPATIENT
Start: 2023-11-10 | End: 2023-11-11

## 2023-11-10 RX ORDER — NALOXONE HYDROCHLORIDE 0.4 MG/ML
80 INJECTION, SOLUTION INTRAMUSCULAR; INTRAVENOUS; SUBCUTANEOUS AS NEEDED
Status: DISCONTINUED | OUTPATIENT
Start: 2023-11-10 | End: 2023-11-10 | Stop reason: HOSPADM

## 2023-11-10 RX ORDER — CLONAZEPAM 1 MG/1
2 TABLET ORAL 3 TIMES DAILY PRN
Status: DISCONTINUED | OUTPATIENT
Start: 2023-11-10 | End: 2023-11-11

## 2023-11-10 RX ORDER — CELECOXIB 200 MG/1
400 CAPSULE ORAL ONCE
Status: COMPLETED | OUTPATIENT
Start: 2023-11-10 | End: 2023-11-10

## 2023-11-10 RX ORDER — CEFAZOLIN SODIUM/WATER 2 G/20 ML
2 SYRINGE (ML) INTRAVENOUS EVERY 8 HOURS
Qty: 40 ML | Refills: 0 | Status: COMPLETED | OUTPATIENT
Start: 2023-11-10 | End: 2023-11-11

## 2023-11-10 RX ORDER — HYDROMORPHONE HYDROCHLORIDE 1 MG/ML
0.8 INJECTION, SOLUTION INTRAMUSCULAR; INTRAVENOUS; SUBCUTANEOUS EVERY 2 HOUR PRN
Status: DISCONTINUED | OUTPATIENT
Start: 2023-11-10 | End: 2023-11-11

## 2023-11-10 RX ORDER — LIDOCAINE HYDROCHLORIDE 10 MG/ML
INJECTION, SOLUTION EPIDURAL; INFILTRATION; INTRACAUDAL; PERINEURAL AS NEEDED
Status: DISCONTINUED | OUTPATIENT
Start: 2023-11-10 | End: 2023-11-10 | Stop reason: SURG

## 2023-11-10 RX ORDER — PROCHLORPERAZINE EDISYLATE 5 MG/ML
5 INJECTION INTRAMUSCULAR; INTRAVENOUS EVERY 8 HOURS PRN
Status: DISCONTINUED | OUTPATIENT
Start: 2023-11-10 | End: 2023-11-11

## 2023-11-10 RX ORDER — SODIUM CHLORIDE 9 MG/ML
INJECTION, SOLUTION INTRAVENOUS CONTINUOUS
Status: DISCONTINUED | OUTPATIENT
Start: 2023-11-10 | End: 2023-11-11

## 2023-11-10 RX ORDER — ASPIRIN 325 MG
325 TABLET, DELAYED RELEASE (ENTERIC COATED) ORAL 2 TIMES DAILY
Qty: 60 TABLET | Refills: 0 | Status: SHIPPED | OUTPATIENT
Start: 2023-11-10

## 2023-11-10 RX ORDER — GLYCOPYRROLATE 0.2 MG/ML
INJECTION, SOLUTION INTRAMUSCULAR; INTRAVENOUS AS NEEDED
Status: DISCONTINUED | OUTPATIENT
Start: 2023-11-10 | End: 2023-11-10 | Stop reason: SURG

## 2023-11-10 RX ORDER — HYDROCODONE BITARTRATE AND ACETAMINOPHEN 7.5; 325 MG/1; MG/1
1 TABLET ORAL EVERY 6 HOURS PRN
Status: DISCONTINUED | OUTPATIENT
Start: 2023-11-10 | End: 2023-11-11

## 2023-11-10 RX ORDER — MORPHINE SULFATE 4 MG/ML
2 INJECTION, SOLUTION INTRAMUSCULAR; INTRAVENOUS EVERY 10 MIN PRN
Status: DISCONTINUED | OUTPATIENT
Start: 2023-11-10 | End: 2023-11-10 | Stop reason: HOSPADM

## 2023-11-10 RX ORDER — ROPIVACAINE HYDROCHLORIDE 5 MG/ML
INJECTION, SOLUTION EPIDURAL; INFILTRATION; PERINEURAL
Status: DISCONTINUED | OUTPATIENT
Start: 2023-11-10 | End: 2023-11-10 | Stop reason: SURG

## 2023-11-10 RX ORDER — TRANEXAMIC ACID 10 MG/ML
INJECTION, SOLUTION INTRAVENOUS AS NEEDED
Status: DISCONTINUED | OUTPATIENT
Start: 2023-11-10 | End: 2023-11-10 | Stop reason: SURG

## 2023-11-10 RX ORDER — LIDOCAINE HYDROCHLORIDE 10 MG/ML
INJECTION, SOLUTION INFILTRATION; PERINEURAL
Status: DISCONTINUED | OUTPATIENT
Start: 2023-11-10 | End: 2023-11-10 | Stop reason: SURG

## 2023-11-10 RX ORDER — EPHEDRINE SULFATE 50 MG/ML
INJECTION, SOLUTION INTRAVENOUS AS NEEDED
Status: DISCONTINUED | OUTPATIENT
Start: 2023-11-10 | End: 2023-11-10 | Stop reason: SURG

## 2023-11-10 RX ORDER — LURASIDONE HYDROCHLORIDE 20 MG/1
60 TABLET, FILM COATED ORAL
Status: DISCONTINUED | OUTPATIENT
Start: 2023-11-10 | End: 2023-11-11

## 2023-11-10 RX ORDER — HYDROCODONE BITARTRATE AND ACETAMINOPHEN 7.5; 325 MG/1; MG/1
1 TABLET ORAL EVERY 6 HOURS PRN
Qty: 40 TABLET | Refills: 0 | Status: SHIPPED | OUTPATIENT
Start: 2023-11-10

## 2023-11-10 RX ORDER — ONDANSETRON 2 MG/ML
INJECTION INTRAMUSCULAR; INTRAVENOUS AS NEEDED
Status: DISCONTINUED | OUTPATIENT
Start: 2023-11-10 | End: 2023-11-10 | Stop reason: SURG

## 2023-11-10 RX ORDER — PROCHLORPERAZINE EDISYLATE 5 MG/ML
5 INJECTION INTRAMUSCULAR; INTRAVENOUS EVERY 8 HOURS PRN
Status: DISCONTINUED | OUTPATIENT
Start: 2023-11-10 | End: 2023-11-10 | Stop reason: HOSPADM

## 2023-11-10 RX ORDER — POLYETHYLENE GLYCOL 3350 17 G/17G
17 POWDER, FOR SOLUTION ORAL DAILY PRN
Status: DISCONTINUED | OUTPATIENT
Start: 2023-11-10 | End: 2023-11-11

## 2023-11-10 RX ORDER — FAMOTIDINE 10 MG/ML
20 INJECTION, SOLUTION INTRAVENOUS 2 TIMES DAILY
Status: DISCONTINUED | OUTPATIENT
Start: 2023-11-10 | End: 2023-11-11

## 2023-11-10 RX ORDER — LABETALOL HYDROCHLORIDE 5 MG/ML
INJECTION, SOLUTION INTRAVENOUS AS NEEDED
Status: DISCONTINUED | OUTPATIENT
Start: 2023-11-10 | End: 2023-11-10 | Stop reason: SURG

## 2023-11-10 RX ORDER — LAMOTRIGINE 200 MG/1
200 TABLET ORAL
Status: DISCONTINUED | OUTPATIENT
Start: 2023-11-10 | End: 2023-11-11

## 2023-11-10 RX ORDER — MELATONIN
325
Qty: 20 TABLET | Refills: 0 | Status: SHIPPED | OUTPATIENT
Start: 2023-11-11

## 2023-11-10 RX ADMIN — DEXAMETHASONE SODIUM PHOSPHATE 4 MG: 4 MG/ML VIAL (ML) INJECTION at 07:47:00

## 2023-11-10 RX ADMIN — ROPIVACAINE HYDROCHLORIDE 30 ML: 5 INJECTION, SOLUTION EPIDURAL; INFILTRATION; PERINEURAL at 10:21:00

## 2023-11-10 RX ADMIN — GLYCOPYRROLATE 0.2 MG: 0.2 INJECTION, SOLUTION INTRAMUSCULAR; INTRAVENOUS at 07:47:00

## 2023-11-10 RX ADMIN — LIDOCAINE HYDROCHLORIDE 50 MG: 10 INJECTION, SOLUTION EPIDURAL; INFILTRATION; INTRACAUDAL; PERINEURAL at 07:47:00

## 2023-11-10 RX ADMIN — EPHEDRINE SULFATE 5 MG: 50 INJECTION, SOLUTION INTRAVENOUS at 08:04:00

## 2023-11-10 RX ADMIN — LIDOCAINE HYDROCHLORIDE 5 ML: 10 INJECTION, SOLUTION INFILTRATION; PERINEURAL at 10:21:00

## 2023-11-10 RX ADMIN — ONDANSETRON 4 MG: 2 INJECTION INTRAMUSCULAR; INTRAVENOUS at 07:47:00

## 2023-11-10 RX ADMIN — CEFAZOLIN SODIUM/WATER 2 G: 2 G/20 ML SYRINGE (ML) INTRAVENOUS at 07:47:00

## 2023-11-10 RX ADMIN — SODIUM CHLORIDE, SODIUM LACTATE, POTASSIUM CHLORIDE, CALCIUM CHLORIDE: 600; 310; 30; 20 INJECTION, SOLUTION INTRAVENOUS at 08:58:00

## 2023-11-10 RX ADMIN — MIDAZOLAM HYDROCHLORIDE 2 MG: 1 INJECTION INTRAMUSCULAR; INTRAVENOUS at 07:35:00

## 2023-11-10 RX ADMIN — TRANEXAMIC ACID 1000 MG: 10 INJECTION, SOLUTION INTRAVENOUS at 07:47:00

## 2023-11-10 RX ADMIN — DEXAMETHASONE SODIUM PHOSPHATE 10 MG: 10 INJECTION, SOLUTION INTRAMUSCULAR; INTRAVENOUS at 10:21:00

## 2023-11-10 RX ADMIN — LABETALOL HYDROCHLORIDE 10 MG: 5 INJECTION, SOLUTION INTRAVENOUS at 08:29:00

## 2023-11-10 RX ADMIN — EPHEDRINE SULFATE 10 MG: 50 INJECTION, SOLUTION INTRAVENOUS at 07:55:00

## 2023-11-10 NOTE — ANESTHESIA PROCEDURE NOTES
Airway  Date/Time: 11/10/2023 7:48 AM  Urgency: Elective    Airway not difficult    General Information and Staff    Patient location during procedure: OR  Resident/CRNA: Boyd Parks CRNA  Performed: CRNA   Performed by: Boyd Parks CRNA  Authorized by: Winburne Room, MD      Indications and Patient Condition  Indications for airway management: anesthesia  Spontaneous Ventilation: absent  Sedation level: deep  Preoxygenated: yes  Patient position: sniffing  MILS maintained throughout  Mask difficulty assessment: 1 - vent by mask  No planned trial extubation    Final Airway Details  Final airway type: endotracheal airway      Successful airway: ETT  Cuffed: yes   Successful intubation technique: direct laryngoscopy  Facilitating devices/methods: intubating stylet  Endotracheal tube insertion site: oral  Blade: Kiarra  Blade size: #3  ETT size (mm): 7.0    Cormack-Lehane Classification: grade I - full view of glottis  Placement verified by: capnometry   Measured from: gums  ETT to gums (cm): 19  Number of attempts at approach: 1  Number of other approaches attempted: 0

## 2023-11-10 NOTE — PHYSICAL THERAPY NOTE
PHYSICAL THERAPY KNEE EVALUATION - INPATIENT       Room Number: Room 2/Room 2-A  Evaluation Date: 11/10/2023  Type of Evaluation: Initial  Physician Order: PT Eval and Treat    Presenting Problem: s/p left TKA 11/10/23     Reason for Therapy: Mobility Dysfunction and Discharge Planning    PHYSICAL THERAPY ASSESSMENT     Patient is a 61year old female admitted 11/10/2023 for left TKA. Patient's current functional deficits include impaired bed mobility, transfers, ambulation and stair negotiation, which are below the patient's pre-admission status. Patient will benefit from continued inpatient physical therapy to address above issues so that patient may achieve highest functional mobility level. Pt ok to see per rn. Pt recd in supine, friend present. Pt educated in role of PT,  ankle pumps for DVT prevention, goals for session. Pt demonstrating quality left quad set, ability to performed slr and maintain left knee extension, instructed and and performed therex per TKA protocol, issued written HEP. Pt transferred supine to sit with cga, good sitting balance, no dizziness, sitting bp 134/86. Pt provided verbal instruction and demonstration of rw use, stood to rw with min a. Pt able to perform marching in place with mild left knee buckling, instructed in rw use, due to dec strength left knee at this time, decision to transfer bed to chair with rw only as pt motivated to be out of the bed, mod a with use of rw and gait belt to amb bed to chair, pt demonstrating good upper body strength with rw use, made comfortable in recliner. Pt educated about POC, discussed dc recommendations. Pt educated in importance of achieving ROM in a timely fashion. Discussed session with RN, discussed assist need with PCT.      THERAPEUTIC EXERCISES  Lower Extremity Ankle pumps  Heel slides  LAQ  Quad sets  left knee flexion, sitting     Position Sitting and Supine         The patient's Approx Degree of Impairment: 57.7% has been calculated based on documentation in the [facility] '6 clicks' Inpatient Basic Mobility Short Form. Research supports that patients with this level of impairment may benefit from home with home PT and supervision, pt reports her friend/caregiver will be able to provide assist//supervision. DISCHARGE RECOMMENDATIONS  PT Discharge Recommendations: Home with home health PT; Intermittent Supervision    PLAN  PT Treatment Plan: Endurance; Energy conservation;Patient education;Gait training;Range of motion;Strengthening  Rehab Potential : Good  Frequency (Obs): Daily       PHYSICAL THERAPY MEDICAL/SOCIAL HISTORY     Problem List  Principal Problem:    Primary osteoarthritis of left knee  Active Problems:    Bipolar affective disorder ([address]      HOME SITUATION  Home Situation  Type of Home: Apartment  Home Layout: Two level; Able to live on main level (pt reports she will be staying on first level and sleeping in recliner)  Lives With: Friend(s) (pt reports her friend, Shiv Hanson, is her caregiver, Shiv Hanson present for PT evaluation)  Patient Owned Equipment: Rolling walker  Patient Regularly Uses: None     Prior Level of [place]: Pt reports ind pta    SUBJECTIVE  \"I want to get out of this bed\"    PHYSICAL THERAPY EXAMINATION     OBJECTIVE  Precautions: Bed/chair alarm  Fall Risk: High fall risk    WEIGHT BEARING RESTRICTION           L Lower Extremity: Weight Bearing as Tolerated    PAIN ASSESSMENT  Ratin  Location: denies at this time  Management Techniques:  Activity promotion    COGNITION  Overall Cognitive Status:  WFL - within functional limits    RANGE OF MOTION AND STRENGTH ASSESSMENT  Upper extremity ROM and strength are within functional limits   Lower extremity ROM is within functional limits except left knee  Lower extremity strength is within functional limits except left LE    BALANCE  Static Sitting: Good  Dynamic Sitting: Good  Static Standing: Poor +  Dynamic Standing: Poor ACTIVITY TOLERANCE           BP: 134/86  BP Location: Left arm  BP Method: Automatic  Patient Position: Sitting    O2 WALK  Oxygen Therapy  SPO2% on Room Air at Rest: 99    AM-PAC '6-Clicks' [address]  How much difficulty does the patient currently have. .. Patient Difficulty: Turning over in bed (including adjusting bedclothes, sheets and blankets)?: A Little   Patient Difficulty: Sitting down on and standing up from a chair with arms (e.g., wheelchair, bedside commode, etc.): A Little   Patient Difficulty: Moving from lying on back to sitting on the side of the bed?: A Little   How much help from another person does the patient currently need. .. Help from Another: Moving to and from a bed to a chair (including a wheelchair)?: A Lot   Help from Another: Need to walk in hospital room?: A Lot   Help from Another: Climbing 3-5 steps with a railing?: A Lot     AM-PAC Score:  Raw Score: 15   Approx Degree of Impairment: 57.7%   Standardized Score (AM-PAC Scale): 39.45   CMS Modifier (G-Code): CK    FUNCTIONAL ABILITY STATUS  Functional Mobility/Gait Assessment  Gait Assistance: Moderate assistance  Distance (ft): 3  Assistive Device: Rolling walker  Pattern: L Decreased stance time (mild buckling left knee, bed to chair only with gait belt, rw)      Exercise/Education Provided:  Bed mobility  Energy conservation  Functional activity tolerated  Gait training  ROM  Strengthening  Transfer training    Patient End of Session: Up in chair;Needs met;Call light within reach;RN aware of session/findings; All patient questions and concerns addressed; Ice applied; Family present (Rn made aware of assist needed)    CURRENT GOALS    Goals to be met by: 11/11/23  Patient Goal Patient's self-stated goal is: to go home with home PT   Goal #1 Patient is able to demonstrate supine - sit EOB @ level: modified independent     Goal #1   Current Status    Goal #2 Patient is able to demonstrate transfers Sit to/from Stand at assistance level: supervision     Goal #2  Current Status    Goal #3 Patient is able to ambulate 150 feet with assistive device at assistance level: supervision   Goal #3   Current Status    Goal #4 Patient will negotiate 4 stairs/one curb w/ assistive device and supervision   Goal #4   Current Status    Goal #5  AROM 0 degrees extension to 95 degrees flexion     Goal #5   Current Status    Goal #6 Patient independently performs home exercise program for ROM/strengthening per the instructions provided in preparation for discharge.    Goal #6  Current Status        Patient Evaluation Complexity Level:  History Low - no personal factors and/or co-morbidities   Examination of body systems Low - addressing 1-2 elements   Clinical Presentation Low - Stable   Clinical Decision Making Low Complexity     Therapeutic Activity: 25 minutes

## 2023-11-10 NOTE — CM/SW NOTE
MDO for dc planning  Post TKA    PT eval pending. CM req DSC send [facility] ref, f2f done. CM spoke with pt re [facility] therapy. Per pt her PCP, Dr Ashanti Keys, set up [facility] (pt unsure of agency name) Pt was instructed to notify her PCP when she is home to start home visits. Per pt no CM/SW needs at this time. Plan  Home with [facility] (agency unk)    / to remain available for support and/or discharge planning.      Foreign Arboleda, ZHANNA    Ext [phone]

## 2023-11-10 NOTE — CM/SW NOTE
Department  notified of request for [facility], aidin referrals started. Assigned CM/SW to follow up with pt/family on further discharge planning.        Mahamed SAM [facility]

## 2023-11-10 NOTE — INTERVAL H&P NOTE
Pre-op Diagnosis: Primary osteoarthritis of left knee [M17.12]    The above referenced H&P was reviewed by Mikell Najjar, MD on 11/10/2023, the patient was examined and no significant changes have occurred in the patient's condition since the H&P was performed. I discussed with the patient and/or legal representative the potential benefits, risks and side effects of this procedure; the likelihood of the patient achieving goals; and potential problems that might occur during recuperation. I discussed reasonable alternatives to the procedure, including risks, benefits and side effects related to the alternatives and risks related to not receiving this procedure. We will proceed with procedure as planned.

## 2023-11-10 NOTE — ANESTHESIA PROCEDURE NOTES
Peripheral Block    Date/Time: 11/10/2023 10:21 AM    Performed by: Gia Resendiz MD  Authorized by: Gia Resendiz MD      General Information and Staff    Start Time:  11/10/2023 10:21 AM  End Time:  11/10/2023 10:26 AM  Anesthesiologist:  Gia Resendiz MD  Performed by:   Anesthesiologist  Patient Location:  PACU      Site Identification: real time ultrasound guided and image stored and retrievable      Reason for Block: at surgeon's request and post-op pain management    Preanesthetic Checklist: 2 patient identifers, IV checked, site marked, risks and benefits discussed, monitors and equipment checked, pre-op evaluation, timeout performed, anesthesia consent, sterile technique used, no prohibitive neurological deficits and no local skin infection at insertion site      Procedure Details    Patient Position:  Supine  Prep: ChloraPrep    Monitoring:  Cardiac monitor  Block Type:  Saphenous  Laterality:  Left  Injection Technique:  Single-shot    Needle    Needle Type:  Short-bevel  Needle Gauge:  22 G  Needle Length:  90 mm  Needle Localization:  Ultrasound guidance  Reason for Ultrasound Use: appropriate spread of the medication was noted in real time and no ultrasound evidence of intravascular and/or intraneural injection            Assessment    Injection Assessment:  Good spread noted, incremental injection, low pressure, local visualized surrounding nerve on ultrasound, negative aspiration for heme and no pain on injection  Paresthesia Pain:  None  Heart Rate Change: No        Medications  11/10/2023 10:21 AM  lidocaine injection 1% - Intradermal   5 mL - 11/10/2023 10:21:00 AM  ropivacaine (NAROPIN) injection 0.5% - Infiltration   30 mL - 11/10/2023 10:21:00 AM  dexamethasone (DECADRON) PF injection 10 mg/ml - Injection   10 mg - 11/10/2023 10:21:00 AM    Additional Comments

## 2023-11-10 NOTE — DISCHARGE INSTRUCTIONS
Keep incision dry for 2 weeks. Change Mepilex dressing every 3-5 days. Ambulate with walker and assist.    Norco or Tylenol for pain. Aspirin 325mg twice daily for 4 weeks for blood clot prevention. May remove dressing and place new mepilex over incisions in 4 days. Keep incision clean and dry. Ice and elevate knee. Weight bear as tolerated. Use walker/crutches for comfort. Follow up with Dr. Bryn Keller in 3 weeks.

## 2023-11-10 NOTE — ANESTHESIA POSTPROCEDURE EVALUATION
Patient: Niall Kunz    Procedure Summary       Date: 11/10/23 Room / Location: [address] [place] [address] MAIN OR 09 / [address] [place] [address] MAIN OR    Anesthesia Start: 0732 Anesthesia Stop:     Procedure: Left total knee replacement (Left: Knee) Diagnosis:       Primary osteoarthritis of left knee      (Primary osteoarthritis of left knee [M17.12])    Surgeons: Amy Reyna MD Anesthesiologist: Elton Liu MD    Anesthesia Type: spinal ASA Status: 2            Anesthesia Type: spinal    Vitals Value Taken Time   /83 11/10/23 0927   Temp 98 11/10/23 0927   Pulse 84 11/10/23 0925   Resp 15 11/10/23 0925   SpO2 98 % 11/10/23 0925   Vitals shown include unfiled device data.     [address] [place] [address] AN Post Evaluation:   Patient Evaluated in PACU  Patient Participation: complete - patient participated  Level of Consciousness: awake  Pain Score: 0  Pain Management: adequate  Airway Patency:patent  Dental exam unchanged from preop  Yes    Cardiovascular Status: acceptable  Respiratory Status: acceptable  Postoperative Hydration acceptable      Breezy Mckeon CRNA  11/10/2023 9:27 AM

## 2023-11-10 NOTE — CONSULTS
[facility]    PATIENT'S NAME: HEENA Childress   ATTENDING PHYSICIAN: Juana Pena MD   CONSULTING PHYSICIAN: Kimi Taylor MD   PATIENT ACCOUNT#:   [id number]    LOCATION:  1E Room 2 A [facility]  MEDICAL RECORD #:   [id number]       YOB: 1960  ADMISSION DATE:       11/10/2023      CONSULT DATE:  11/10/2023    REPORT OF CONSULTATION      REASON FOR ADMISSION:  Advanced degenerative joint disease, left knee, for left total knee arthroplasty. HISTORY OF PRESENT ILLNESS:  The patient is a 59-year-old  female who had chronic left knee pain, debilitating, with advanced degenerative joint disease. Has exhausted outpatient conservative medical management options and scheduled for total knee arthroplasty by Dr. Monique Lovell. Postprocedure brought into PACU for further management and received adductor canal block. PAST MEDICAL HISTORY:  Osteoarthritis, osteoporosis, migraines, lumbar spondylosis, diverticulosis, hiatal hernia, bipolar affective disorder, and gastroesophageal reflux disease. PAST SURGICAL HISTORY:  Appendectomy, 2 C-sections, total abdominal hysterectomy, bilateral salpingo-oophorectomy, and right breast biopsy. MEDICATIONS:  Please see medication reconciliation list.     ALLERGIES:  No known drug allergies. SOCIAL HISTORY:  Ex-tobacco user. No current tobacco, alcohol, or drug use. Lives with her family. Independent for basic activities of daily living. FAMILY HISTORY:  Father had lung cancer. Mother had non-Hodgkin's lymphoma. REVIEW OF SYSTEMS:  Currently resting in bed. Pain in her left knee is manageable. She denies any chest pain or shortness of breath. Other 12-point review of systems is negative. PHYSICAL EXAMINATION:    GENERAL:  Alert and oriented to time, place, and person. No acute distress.     VITAL SIGNS:  Temperature 98.0, pulse 72, respiratory rate 18, blood pressure 117/92, pulse ox 94% on 2L nasal cannula oxygen. HEENT:  Atraumatic. Oropharynx clear. Dry mucous membranes. Normal hard and soft palate. Eyes:  Anicteric sclerae. NECK:  Supple. No lymphadenopathy. Trachea midline. Full range of motion. LUNGS:  Clear to auscultation bilaterally. Normal respiratory effort. HEART:  Regular rate and rhythm. S1 and S2 auscultated. No murmur. ABDOMEN:  Soft, nondistended. No tenderness. Positive bowel sounds. EXTREMITIES:  No peripheral edema, clubbing, or cyanosis. Left knee dressing. NEUROLOGIC:  Motor and sensory intact. ASSESSMENT AND PLAN:  Advanced primary osteoarthritis, left knee, status post left total knee arthroplasty, postoperative adductor canal block. Pain control. Neurovascular checks. Aspirin for DVT prophylaxis. Physical and occupational therapy.     Dictated By Sophia Brantley MD  d: 11/10/2023 11:26:17  t: 11/10/2023 11:51:20  Job [id number]  FB/

## 2023-11-10 NOTE — OPERATIVE REPORT
[facility]    PATIENT'S NAME: [email or web address]   ATTENDING PHYSICIAN: Juan F Ponce MD   OPERATING PHYSICIAN: Juan F Ponce MD   PATIENT ACCOUNT#:   [de-identified]    LOCATION:  [facility] PACU [address] [place] [address]  MEDICAL RECORD #:   [id number]       YOB: 1960  ADMISSION DATE:       11/10/2023      OPERATION DATE:  11/10/2023    OPERATIVE REPORT    PREOPERATIVE DIAGNOSIS:  Osteoarthritis, left knee. POSTOPERATIVE DIAGNOSIS:  Osteoarthritis, left knee. PROCEDURE:  Left total knee arthroplasty with Medacta MRI-navigated patient-specific instruments. ASSISTANT:  Valeriy Gamez PA-C.    COMPONENTS USED:  Medacta sphere size 4 femur, size T3I4 tibia, size 2 patella, and 10 mm spacer. ESTIMATED BLOOD LOSS:  50 mL. COMPLICATIONS:  None. INDICATIONS:  The patient is a 61-year-old female patient with advanced arthritis of the left knee. It has not responded to appropriate conservative management. After discussion of the options for treatment, the patient requested the above procedure. Our discussion included alternative treatments, and also included, but was not limited to, the potential risk of anesthetic complications, infection, DVT or PE, bleeding complications, periprosthetic fracture or extensor mechanism failure, potential need for revision surgery, nerve or vascular injury, unanticipated perioperative orthopedic or medical complications, etc.  Written consent was obtained. OPERATIVE TECHNIQUE:  The patient was brought to the operating room and given appropriate anesthesia. Prior to making an incision, a time out was performed by the surgical team.  A tourniquet was placed, and the knee was prepped and draped in the usual sterile fashion. After exsanguination with an Esmarch bandage, the tourniquet was inflated with the knee fully flexed. A longitudinal incision was made from just superior to the superomedial corner of the patella to the tibial tubercle.   A midvastus approach to the femur was used. The patellar cut was made first, and a patella protector was placed over the cut surface of the patella which was then subluxed into the lateral gutter. The anterior and posterior cruciate ligaments were excised, and the femoral template was placed over the distal femur and seated well. It was pinned anteriorly, and the distal reference holes were drilled. The distal femoral cutting guide was then attached to the anterior pins, and the distal femoral cut was made. The thickness of the distal femoral bone resection was measured and compared to the templated values. The pins were then translated into the anterior reference holes, and the distal femoral cutting guide was applied. The distal femoral guide was centered appropriately, pinned in place, and the distal femoral finishing cuts were made. Next, the meniscal remnants were excised, and the tibial guide was seated. The extramedullary tibial alignment guide was applied, and alignment was checked. The tibial template was then pinned into place and exchanged for the tibial cutting guide. Alignment of the cutting guide was rechecked with the extramedullary alignment guide. Hohmann retractor was placed behind the tibia to protect the neurovascular structures, and Hohmann retractors were placed medially and laterally to protect the collateral ligaments and the patellar tendon. The tibial cut was made and the tibial bone resection was taken and compared to the templated values. Overall alignment was then checked with the extramedullary alignment guide. The spacer block was used, and the overall alignment, flexion and extension gaps were checked and were excellent. The guide was used to size the patella. The holes were drilled for the patella. The trial components were then inserted. The knee came to full extension and balanced well with varus and valgus stress with symmetrical flexion and extension gaps with the spacer. The tibial guide was then pinned into place and preparations were made for the stemmed portion of the tibial component. The trochlear recess was cut for the femur. The trial components were then removed, and the bone was prepared with pulsatile lavage. All three components were cemented in place using contemporary technique. Excess cement was removed, and trials again were performed. Stability and alignment were the same as with the provisional components. The actual polyethylene spacer was locked into place in the tibial tray. The tourniquet was deflated and hemostasis was achieved. The wound was closed in routine fashion. Sponge and instrument counts were correct at the end of the procedure. Estimated blood loss was 50 mL. Routine specimens were sent to Pathology. There were no complications. The patient was stable under the care of the anesthesiologist at the completion of the procedure.      Dictated By Juana Pena MD  d: 11/10/2023 09:14:47  t: 11/10/2023 09:35:03  Job [id number]  JSM/

## 2023-11-10 NOTE — BRIEF OP NOTE
Pre-Operative Diagnosis: Primary osteoarthritis of left knee [M17.12]     Post-Operative Diagnosis: Primary osteoarthritis of left knee [M17.12]      Procedure Performed:   Left total knee replacement    Surgeon(s) and Role:     * Joanne Cintron MD - Primary    Assistant(s):  Surgical Assistant.: Lainey Senior  PA: Ezra Menjivar PA-C     Surgical Findings: OA     Specimen: path     Estimated Blood Loss: No data recorded    Dictation Number:  [id number]    Dale Strange MD  11/10/2023  9:15 AM

## 2023-11-11 ENCOUNTER — TELEPHONE (OUTPATIENT)
Dept: ORTHOPEDICS CLINIC | Facility: CLINIC | Age: 63
End: 2023-11-11

## 2023-11-11 VITALS
OXYGEN SATURATION: 98 % | BODY MASS INDEX: 25.78 KG/M2 | TEMPERATURE: 98 F | HEIGHT: 64 IN | SYSTOLIC BLOOD PRESSURE: 93 MMHG | RESPIRATION RATE: 16 BRPM | HEART RATE: 68 BPM | WEIGHT: 151 LBS | DIASTOLIC BLOOD PRESSURE: 65 MMHG

## 2023-11-11 LAB
DEPRECATED RDW RBC AUTO: 44.2 FL (ref 35.1–46.3)
ERYTHROCYTE [DISTWIDTH] IN BLOOD BY AUTOMATED COUNT: 13 % (ref 11–15)
HCT VFR BLD AUTO: 30.9 %
HGB BLD-MCNC: 10.1 G/DL
MCH RBC QN AUTO: 30.3 PG (ref 26–34)
MCHC RBC AUTO-ENTMCNC: 32.7 G/DL (ref 31–37)
MCV RBC AUTO: 92.8 FL
PLATELET # BLD AUTO: 245 10(3)UL (ref 150–450)
RBC # BLD AUTO: 3.33 X10(6)UL
WBC # BLD AUTO: 12.5 X10(3) UL (ref 4–11)

## 2023-11-11 PROCEDURE — 99239 HOSP IP/OBS DSCHRG MGMT >30: CPT | Performed by: HOSPITALIST

## 2023-11-11 NOTE — DISCHARGE SUMMARY
[facility]     Discharge Summary    Kevin Winn Patient Status:  Outpatient in a Bed    1960 MRN [id number]   Location [facility] Attending Daniele Deluna MD   Hosp Day # 0 PCP Yasmani Byers DO     Date of Admission: 11/10/2023    Date of Discharge: 2023    Admitting Diagnosis: Primary osteoarthritis of left knee [M17.12]  Primary osteoarthritis of left knee    Discharge Diagnosis:   Patient Active Problem List   Diagnosis    Diverticulosis of colon without hemorrhage    History of Helicobacter pylori infection    Hiatal hernia    Hx of gastroesophageal reflux (GERD)    Family history of malignant neoplasm of ovary    Family history of malignant neoplasm of breast    Meibomian gland dysfunction (MGD), bilateral, both upper and lower lids    Age-related nuclear cataract of both eyes    Hyperopia of both eyes with astigmatism and presbyopia    Lumbar spondylosis    Deviated septum    Snoring    Migraine without status migrainosus, not intractable    Osteoarthritis of lumbar spine    Chronic dental infection    Mixed bipolar I disorder (HCC)    Prolonged Q-T interval on ECG    Non-traumatic compression fracture of first lumbar vertebra (HCC)    Compression fracture of T9 vertebra (HCC)    Primary osteoarthritis of left knee    Bipolar affective disorder ([address]       Reason for Admission:     Primary osteoarthritis of Left knee    Physical Exam:      GENERAL:  Alert and oriented to time, place, and person. No acute distress. VITAL SIGNS:  Temperature 98.0, pulse 72, respiratory rate 18, blood pressure 117/92, pulse ox 94% on 2L nasal cannula oxygen. HEENT:  Atraumatic. Oropharynx clear. Dry mucous membranes. Normal hard and soft palate. Eyes:  Anicteric sclerae. NECK:  Supple. No lymphadenopathy. Trachea midline. Full range of motion. LUNGS:  Clear to auscultation bilaterally. Normal respiratory effort. HEART:  Regular rate and rhythm. S1 and S2 auscultated.   No murmur. ABDOMEN:  Soft, nondistended. No tenderness. Positive bowel sounds. EXTREMITIES:  No peripheral edema, clubbing, or cyanosis. Left knee dressing. NEUROLOGIC:  Motor and sensory intact. Hospital Course:    Advanced primary osteoarthritis, left knee, status post left total knee arthroplasty, postoperative adductor canal block. Pain control. Neurovascular checks. Aspirin for DVT prophylaxis. Physical and occupational therapy. History of Present Illness:     Per admitting attending: The patient is a 78-year-old  female who had chronic left knee pain, debilitating, with advanced degenerative joint disease. Has exhausted outpatient conservative medical management options and scheduled for total knee arthroplasty by Dr. Brunilda Martinez. Postprocedure brought into PACU for further management and received adductor canal block. Disposition: Home or Self Care    Discharge Condition: Stable    Discharge Medications:   Current Discharge Medication List        START taking these medications    Details   docusate sodium 100 MG Oral Cap Take 100 mg by mouth 2 (two) times daily. Qty: 20 capsule, Refills: 0      ferrous sulfate 325 (65 FE) MG Oral Tab EC Take 1 tablet (325 mg total) by mouth daily with breakfast.  Qty: 20 tablet, Refills: 0      HYDROcodone-acetaminophen 7.5-325 MG Oral Tab Take 1 tablet by mouth every 6 (six) hours as needed. Qty: 40 tablet, Refills: 0           CONTINUE these medications which have CHANGED    Details   aspirin 325 MG Oral Tab EC Take 1 tablet (325 mg total) by mouth in the morning and 1 tablet (325 mg total) before bedtime. Qty: 60 tablet, Refills: 0           CONTINUE these medications which have NOT CHANGED    Details   Cholecalciferol (VITAMIN D3) 1.25 MG (35947 UT) Oral Tab Take 1 tablet by mouth once a week for 12 doses. Qty: 12 tablet, Refills: 0      Meloxicam 15 MG Oral Tab Take 1 tablet (15 mg total) by mouth daily.   Qty: 30 tablet, Refills: 0      methocarbamol 750 MG Oral Tab Take 1 tablet (750 mg total) by mouth 3 (three) times daily. Qty: 90 tablet, Refills: 0      Acetaminophen 500 MG Oral Cap Take 1-2 capsules (500-1,000 mg total) by mouth every 6 (six) hours as needed for Pain. Qty: 30 capsule, Refills: 0      lamoTRIgine 200 MG Oral Tab Take 1 tablet (200 mg total) by mouth daily as needed. STIMULANT LAXATIVE 8.6-50 MG Oral Tab Take 1 tablet by mouth 2 (two) times daily. LATUDA 60 MG Oral Tab Take 1 tablet (60 mg total) by mouth daily as needed. Refills: 2      ClonazePAM 2 MG Oral Tab Take 1 tablet 3 x a day             Total dc time > 30 min    Ivan Emanuel MD  11/11/2023  9:37 AM     Hospital Discharge Diagnoses:  Primary osteoarthritis of Left knee    Lace+ Score: 19  59-90 High Risk  29-58 Medium Risk  0-28   Low Risk. TCM Follow-Up Recommendation:  LACE < 29: Low Risk of readmission after discharge from the hospital; Still recommend for TCM follow-up.

## 2023-11-11 NOTE — PHYSICAL THERAPY NOTE
PHYSICAL THERAPY KNEE TREATMENT NOTE - INPATIENT     Room Number: Room 2/Room 2-A             Presenting Problem: s/p left TKA 11/10/23       Problem List  Principal Problem:    Primary osteoarthritis of left knee  Active Problems:    Bipolar affective disorder ([address]      PHYSICAL THERAPY ASSESSMENT   Patient is a 61year old female admitted 11/10/2023 for left TKA. Pt seen for PT for rom, strengthening, gait training on levels and stairs, verbal educated in car transfers. Pt currently at mod ind/supervision level for all fxal mobility and has met all PT goals. The patient's Approx Degree of Impairment: 28.97% has been calculated based on documentation in the [facility] '6 clicks' Inpatient Basic Mobility Short Form. Research supports that patients with this level of impairment may benefit from friend and home PT. Pt has all needed DME for home. Skilled Therapy Provided:  Pt recd in recliner, friend present,  educated in goals for session. Pt was educated in exercises for strength and flexibility via verbal and written instructions. Pt completed exercises as instructed with cues for proper technique and rest breaks as needed to recover from pain and fatigue. Pt instructed in use of towel roll under left heel to encourage knee extension, also instructed in importance of attaining ROM in timely manner. Gait training with emphasis on upright posture, correct sequencing including left heel strike with heel toe pattern, allowing left knee flexion during swing phase of gait. Pt tolerated gait training well, able to demonstrate step through gait pattern safely. Pt instructed in correct sequencing for ambulation on stairs, amb up and down 4 stairs with railing with supervision assist, demonstrating good left quad control.      Pt instructed to cont to ambulate with [facility] staff until discharged from hospital, date of discharge to be determined by MD.    Exercises AM Session   Ankle Pumps 10 reps   Quad Sets 10 reps Heel slides 10 reps   Sitting Knee Flexion 10 reps   Extension stretch  1x     DISCHARGE RECOMMENDATIONS  PT Discharge Recommendations: Home with home health PT; Intermittent Supervision    PLAN  PT Treatment Plan: Endurance; Energy conservation;Patient education;Gait training;Range of motion;Strengthening  Frequency (Obs): Daily      SUBJECTIVE  \"I want to go home\"    OBJECTIVE  Precautions: Bed/chair alarm    WEIGHT BEARING STATUS           L Lower Extremity: Weight Bearing as Tolerated    PAIN ASSESSMENT   Ratin  Location: left tka  Management Techniques: Activity promotion    BALANCE  Static Sitting: Good  Dynamic Sitting: Good  Static Standing: Fair -  Dynamic Standing: Fair -    ACTIVITY TOLERANCE                         O2 WALK       AM-PAC '6-Clicks' INPATIENT SHORT FORM - BASIC MOBILITY  How much difficulty does the patient currently have. .. Patient Difficulty: Turning over in bed (including adjusting bedclothes, sheets and blankets)?: None   Patient Difficulty: Sitting down on and standing up from a chair with arms (e.g., wheelchair, bedside commode, etc.): None   Patient Difficulty: Moving from lying on back to sitting on the side of the bed?: None   How much help from another person does the patient currently need. .. Help from Another: Moving to and from a bed to a chair (including a wheelchair)?: None   Help from Another: Need to walk in hospital room?: A Little   Help from Another: Climbing 3-5 steps with a railing?: A Lot     AM-PAC Score:  Raw Score: 21   Approx Degree of Impairment: 28.97%   Standardized Score (AM-PAC Scale): 50.25   CMS Modifier (G-Code): CJ    FUNCTIONAL ABILITY STATUS  Functional Mobility/Gait Assessment  Gait Assistance:  Moderate assistance  Distance (ft): 150  Assistive Device: Rolling walker  Pattern: Within Functional Limits  Stairs: Stairs  How Many Stairs: 8  Device: 2 Rails  Assist: Supervision    Knee ROM   10-60    Patient End of Session: Up in chair;Needs met;Call light within reach;RN aware of session/findings; All patient questions and concerns addressed; Family present    CURRENT GOALS  Goals to be met by: 11/11/23  Patient Goal Patient's self-stated goal is: to go home with home PT   Goal #1 Patient is able to demonstrate supine - sit EOB @ level: modified independent      Goal #1   Current Status     Goal #2 Patient is able to demonstrate transfers Sit to/from Stand at assistance level: supervision      Goal #2  Current Status     Goal #3 Patient is able to ambulate 150 feet with assistive device at assistance level: supervision   Goal #3   Current Status     Goal #4 Patient will negotiate 4 stairs/one curb w/ assistive device and supervision   Goal #4   Current Status     Goal #5  AROM 0 degrees extension to 95 degrees flexion     Goal #5   Current Status     Goal #6 Patient independently performs home exercise program for ROM/strengthening per the instructions provided in preparation for discharge.      Therapeutic Activity: 45 minutes

## 2023-11-13 ENCOUNTER — TELEPHONE (OUTPATIENT)
Facility: CLINIC | Age: 63
End: 2023-11-13

## 2023-11-13 ENCOUNTER — TELEPHONE (OUTPATIENT)
Dept: ORTHOPEDICS CLINIC | Facility: CLINIC | Age: 63
End: 2023-11-13

## 2023-11-13 NOTE — TELEPHONE ENCOUNTER
López(on HIPAA) indicated that patient had left knee surgery on 11/10/2023 and was discharged home  on 11/11/2023 but told that patient was to have home health physical therapy and that someone would be contacting them. Stated that no one has contacted them. Stated that they called Dr Mayra Samuel office but they did not know anything about it and did not see any orders. Please advise on home health orders.

## 2023-11-13 NOTE — TELEPHONE ENCOUNTER
Has questions about pending lab- tissue specimen in process - and has a question about nerve block during surgery - she has not received any call about after care either

## 2023-11-13 NOTE — TELEPHONE ENCOUNTER
I called Gia in [facility] care  [phone] to clarify HH. care.She stated that Inga has to call the network number on the back of her Medicaid card and find out which HH is approved / or in network . After that she needs to call the office and let them know what facility she can have care by, so Dr. Brown can order care for that  specific HH company. The office may have to FAX that order to that company.

## 2023-11-13 NOTE — TELEPHONE ENCOUNTER
Per Willy he called a few companies for home health and they are not in network. Per Willy he can't do it anymore and states the office needs to take over. Please advise

## 2023-11-13 NOTE — TELEPHONE ENCOUNTER
Spoke to Tiago, (on ALEX, verified patient's name and ). Tiago said that he spoke to ortho and they asked him to call insurance to find out who the home health provider is and them call them back with that information. RN relayed that sounds right. He will work on this and update ortho with the information. He will call back if needed.

## 2023-11-13 NOTE — TELEPHONE ENCOUNTER
Post-op call for Dr. Brown    Date: 11/13/2023      Time: 11:38 AM   Patient: Inga Rizo      number: [id number]   Surgery and surgery date:11/10/2023     Procedure Laterality Anesthesia   Left total knee replacement       Patient unavailable.  Left message on voicemail to call clinic with questions or concerns.  Number was provided./ Spoke to patient and she gave me permission to speak to her friend Willy  Patient's general feeling since discharge:/ still trying to exercise her feet waiting for a nurse to call for HH.  Pain control (0-10):/ 6-7  Pain Medication:  dose/strength/   Medication Quantity Refills Start End   HYDROcodone-acetaminophen 7.5-325 MG Oral Tab         aspirin 325 MG Oral Tab EC 60 tablet 0 11/10/2023    Sig:   Take 1 table       Fever: no  Chills: no  SOB: no  Incision site appearance:  Redness  no  Drainage no  Clean/dry/Intact yes  Calf pain, redness or warmth:no   Bowel Regimen: Yes /very small amount. Encouraged fresh fruit with peeling, prunes, stool softener, and Miralax   If not, what are you taking stool softener/laxative?  Confirmed appointment date for post op:12/6/2023  Other concerns patients may ask: as stated below about HH/ Asking why Willy was kept out of the pre-op? They were doing the block by anesthesiologist. States her leg is swollen. She has not been elevating properly and has her foot on the floor part of the time. We discussed the importance of elevation and the ice regime   Bathing and bandages   Patient needs to keep incision clean and dry for the first week./ DONE  Enforce rest, ice, compression elevation and use their pain medication accordingly./DONE  If the patient needs more pain medication, please put in a communication.

## 2023-11-14 ENCOUNTER — TELEPHONE (OUTPATIENT)
Dept: ORTHOPEDICS CLINIC | Facility: CLINIC | Age: 63
End: 2023-11-14

## 2023-11-14 DIAGNOSIS — Z96.652 S/P TOTAL KNEE ARTHROPLASTY, LEFT: Primary | ICD-10-CM

## 2023-11-14 NOTE — TELEPHONE ENCOUNTER
kavitha from [facility] called. Pt had knee replacement surgery. Question on home health physical therapy ordered. Where is pt being referred to. Pt does not know.   Please call

## 2023-11-14 NOTE — TELEPHONE ENCOUNTER
Asking for [facility] order to be faxed to [facility] service at [phone]    Phone [phone] January Rg , [place]

## 2023-11-15 NOTE — TELEPHONE ENCOUNTER
Spoke with Venus from [facility]. She indicated that she received my fax and requested progress note. Sent discharge summary from hospital as she patient has not been seen in office since.

## 2023-11-15 NOTE — TELEPHONE ENCOUNTER
Patients friend Latasha Abdalla getting upset/inpatient that it has now been 3 days that patient has been without P.T. and her leg is starting to stiffen up. Patient request for this to be seen as a high priority, and for order to be faxed as soon as possible to fax provided on original messaging thread. Patients friend is indicates he would like an update within the next hour, he will call back and escalate this issue to his  as he states that we are being incompetent. Please call as soon as possible at [phone],[facility].

## 2023-11-15 NOTE — TELEPHONE ENCOUNTER
Called [facility] to confirm fax number. They indicated they do not have this patient in their roster. Will Call patient. Per Baby Railing, they are waiting for physician order, will fax now.     Order faexd, confirmation received

## 2023-11-15 NOTE — TELEPHONE ENCOUNTER
[facility]  [facility] calling regarding the notes that was supposed to be faxed over [phone] please advise

## 2023-11-15 NOTE — TELEPHONE ENCOUNTER
Spoke with [facility], indicated they needed demographic information for patient. Advised patient does not have any other progress notes. Was unable to locate formal face sheet but printed basic demographics info to fax to [address] Info faxed.

## 2023-11-16 RX ORDER — MELOXICAM 15 MG/1
15 TABLET ORAL DAILY
Qty: 30 TABLET | Refills: 0 | Status: SHIPPED | OUTPATIENT
Start: 2023-11-16

## 2023-11-16 NOTE — TELEPHONE ENCOUNTER
Refill passed per [facility] protocol.     Requested Prescriptions   Pending Prescriptions Disp Refills    MELOXICAM 15 MG Oral Tab [Pharmacy Med Name: MELOXICAM 15MG TABLETS] 30 tablet 0     Sig: TAKE 1 TABLET(15 MG) BY MOUTH DAILY       Non-Narcotic Pain Medication Protocol Passed - 11/15/2023 10:11 AM        Passed - In person appointment or virtual visit in the past 6 mos or appointment in next 3 mos     Recent Outpatient Visits              2 weeks ago Primary osteoarthritis of left knee    [facility] Sonny Romeo PA-C    Office Visit    4 weeks ago Preop examination    [facility] Sayda Akers DO    Office Visit    1 month ago Ingrown toenail    [facility] Shi Hernandez DPM    [facility] Shi Hernandez DPM    Office Visit    2 months ago Primary osteoarthritis of left knee    [facility] Brown MD    Office Visit          Future Appointments         Provider Department Appt Notes    In 2 weeks Sonny Romeo PA-C [facility] 1st POV Left TKA 11/10/23    In 3 weeks [facility].5T) [facility]     In 3 weeks [facility] (1.5T) [facility]     In 1 month [facility] CARMINE RM1; [facility] DEXA [address] DEXA - [place]                     @[email or web address]@      [unfilled]

## 2023-12-04 DIAGNOSIS — S32.010A CLOSED COMPRESSION FRACTURE OF BODY OF L1 VERTEBRA (HCC): ICD-10-CM

## 2023-12-04 DIAGNOSIS — S22.070D COMPRESSION FRACTURE OF T9 VERTEBRA WITH ROUTINE HEALING, SUBSEQUENT ENCOUNTER: Primary | ICD-10-CM

## 2023-12-05 ENCOUNTER — TELEPHONE (OUTPATIENT)
Dept: ADMINISTRATIVE | Age: 63
End: 2023-12-05

## 2023-12-05 ENCOUNTER — TELEPHONE (OUTPATIENT)
Facility: CLINIC | Age: 63
End: 2023-12-05

## 2023-12-05 NOTE — TELEPHONE ENCOUNTER
Called patient to let her know we received a denial for her upcoming MRI and he needs to come in ASAP to get XRAY done and after that we will try again for MRI

## 2023-12-05 NOTE — TELEPHONE ENCOUNTER
Hello,    Prior authorization for Thoracic spine mri has been denied by payer/[employer].    Denial rationale:     Based on [employer] Guidelines Section(s): Pathological Spinal   Compression Fractures (SP 11.1) and 1.0 General Guidelines, we cannot approve this   request. Your healthcare provider told us that you have spine pain. The request cannot   be approved because:  You must provide results of an x-ray taken after your symptoms started or changed that   show a need for further imaging.    -------------------------------------------------------------------------  Case remains [id number] eligible until end of day 1211  Case remains first level appeal eligible for the next 60 calendar days, appeal information may be found within denial letter.  Yzfx-xl-Mazq offer letter and denial letter PDF's may be found within the Media tab. Please un-check \"Clinical Info Only\" to view.      Please note: A new case may not be submitted to payer for the same or similar CPT code until 2.7.24 or later.      Please advise.  Thank you!      Mariam GENTILE  Referral Specialist  Centralized Prior Authorizations/Referrals  [place]/[facility]

## 2023-12-05 NOTE — TELEPHONE ENCOUNTER
Hello,    Prior authorization for Lumbar Spine Mri has been denied by payer/[employer].    Denial rationale:     Based on [employer] Guidelines Section(s): Pathological Spinal   Compression Fractures (SP 11.1) and 1.0 General Guidelines, we cannot approve this   request. Your healthcare provider told us that you have spine pain. The request cannot   be approved because:  You must provide results of an x-ray taken after your symptoms started or changed that   show a need for further imaging.    -------------------------------------------------------------------------  Case remains [id number] eligible until end of day 12.11  Case remains first level appeal eligible for the next 60 calendar days, appeal information may be found within denial letter.  Itmi-lc-Moyg offer letter and denial letter PDF's may be found within the Media tab. Please un-check \"Clinical Info Only\" to view.      Please note: A new case may not be submitted to payer for the same or similar CPT code until  2.7.24 or later.      Please advise.  Thank you!      Mariam GENTILE  Referral Specialist  Centralized Prior Authorizations/Referrals  [place]/[facility]

## 2023-12-06 ENCOUNTER — TELEPHONE (OUTPATIENT)
Dept: ORTHOPEDICS CLINIC | Facility: CLINIC | Age: 63
End: 2023-12-06

## 2023-12-06 NOTE — TELEPHONE ENCOUNTER
Phea from [facility] calling regarding order to be signed and fax back to them  (plan of care & PT evaluation)  Fax number [phone]

## 2023-12-07 NOTE — TELEPHONE ENCOUNTER
Spoke with Jennifer Beck and indicated we do not have fax sent. She will resend and we will get to MD when received.

## 2023-12-11 ENCOUNTER — OFFICE VISIT (OUTPATIENT)
Dept: ORTHOPEDICS CLINIC | Facility: CLINIC | Age: 63
End: 2023-12-11

## 2023-12-11 ENCOUNTER — HOSPITAL ENCOUNTER (OUTPATIENT)
Dept: GENERAL RADIOLOGY | Facility: HOSPITAL | Age: 63
Discharge: HOME | End: 2023-12-11
Attending: PHYSICIAN ASSISTANT
Payer: MEDICAID

## 2023-12-11 DIAGNOSIS — Z47.89 ORTHOPEDIC AFTERCARE: Primary | ICD-10-CM

## 2023-12-11 DIAGNOSIS — Z47.89 ORTHOPEDIC AFTERCARE: ICD-10-CM

## 2023-12-11 PROCEDURE — 73562 X-RAY EXAM OF KNEE 3: CPT | Performed by: PHYSICIAN ASSISTANT

## 2023-12-11 PROCEDURE — 99024 POSTOP FOLLOW-UP VISIT: CPT | Performed by: PHYSICIAN ASSISTANT

## 2023-12-11 NOTE — PROGRESS NOTES
NURSING INTAKE COMMENTS:   Chief Complaint   Patient presents with    Post-Op     1st POV Left TKA 11/10/23. Pain 5/10, numbness. HPI: This 61year old female presents today for follow-up on her left knee. She is 4 weeks status post left total knee arthroplasty. Overall she is feeling well. She still getting therapy at home because she does not have transportation. She is ambulating with a cane. Past Medical History:   Diagnosis Date    Anxiety state     Back pain     medical management of pain    Back problem     Cataract     Constipation     Depression     Diverticulosis     Gastritis     GERD (gastroesophageal reflux disease)     Headache 2012    Hiatal hernia     History of Helicobacter pylori infection 10/06/2015    Migraines     Osteoarthritis     Visual impairment     glasses     Past Surgical History:   Procedure Laterality Date    APPENDECTOMY      BONE REPLACEMENT GRAFT            x2    COLONOSCOPY      D & C  2000    EGD      HYSTERECTOMY      TVH    HYSTEROSCOPY      CARMINE LOCALIZATION WIRE 1 SITE RIGHT (CPT=19281)      OTHER SURGICAL HISTORY      cancer mole removed    OTHER SURGICAL HISTORY      removed ovarires     Current Outpatient Medications   Medication Sig Dispense Refill    Meloxicam 15 MG Oral Tab Take 1 tablet (15 mg total) by mouth daily. 30 tablet 0    docusate sodium 100 MG Oral Cap Take 100 mg by mouth 2 (two) times daily. 20 capsule 0    ferrous sulfate 325 (65 FE) MG Oral Tab EC Take 1 tablet (325 mg total) by mouth daily with breakfast. 20 tablet 0    Cholecalciferol (VITAMIN D3) 1.25 MG (76757 UT) Oral Tab Take 1 tablet by mouth once a week for 12 doses. 12 tablet 0    methocarbamol 750 MG Oral Tab Take 1 tablet (750 mg total) by mouth 3 (three) times daily. 90 tablet 0    Acetaminophen 500 MG Oral Cap Take 1-2 capsules (500-1,000 mg total) by mouth every 6 (six) hours as needed for Pain.  30 capsule 0    lamoTRIgine 200 MG Oral Tab Take 1 tablet (200 mg total) by mouth daily as needed. STIMULANT LAXATIVE 8.6-50 MG Oral Tab Take 1 tablet by mouth 2 (two) times daily. LATUDA 60 MG Oral Tab Take 1 tablet (60 mg total) by mouth daily as needed.   2    ClonazePAM 2 MG Oral Tab Take 1 tablet 3 x a day       No Known Allergies  Family History   Problem Relation Age of Onset    Cancer Father 28        lung ca    Breast Cancer Maternal Grandmother 48        d.55    Cancer Maternal Grandfather 76        colon ca    Cancer Mother 61        non-Hodgkins lymphoma (cause of death)    Colon Cancer Maternal Uncle 61        (cause of death)    Cancer Maternal Uncle 61        Hodgkins lymphoma    Cancer Paternal Aunt         liver (cause of death)    Breast Cancer Paternal Grandmother 70    Cancer Paternal Grandfather 76        colon ca    Ovarian Cancer Paternal Aunt     Breast Cancer Paternal Aunt     Breast Cancer Paternal Cousin Female 39        d.50    Ovarian Cancer Paternal Cousin Female 45        d.45    Cancer Paternal Uncle         colon ca    Cancer Paternal Uncle         brain ca    Cancer Brother 64        Hodgkins    Diabetes Neg     Glaucoma Neg     Macular degeneration Neg        Social History     Occupational History    Not on file   Tobacco Use    Smoking status: Former     Types: Cigarettes     Quit date: 2010     Years since quittin.9    Smokeless tobacco: Never   Vaping Use    Vaping Use: Never used   Substance and Sexual Activity    Alcohol use: No     Alcohol/week: 0.0 standard drinks of alcohol    Drug use: No    Sexual activity: Not on file        Review of Systems:  GENERAL: feels generally well, no significant weight loss or weight gain  SKIN: no ulcerated or worrisome skin lesions  EYES:denies blurred vision or double vision  HEENT: denies new nasal congestion, sinus pain or ST  LUNGS: denies shortness of breath  CARDIOVASCULAR: denies chest pain  GI: no hematemesis, no worsening heartburn, no diarrhea  : no dysuria, no blood in urine, no difficulty urinating, no incontinence  MUSCULOSKELETAL: no other musculoskeletal complaints other than in HPI  NEURO: no numbness or tingling, no weakness or balance disorder  PSYCHE: no depression or anxiety  HEMATOLOGIC: no hx of blood dyscrasia  ENDOCRINE: no thyroid or diabetes issues  ALL/ASTHMA: no new hx of severe allergy or asthma    Physical Examination:    There were no vitals taken for this visit. Constitutional: appears well hydrated, alert and responsive, no acute distress noted  Extremities: The incision is healing well. No redness or significant swelling. She lacks about 5 degrees of extension and has 90 degrees of flexion. Her calf is soft and nontender      Imaging: Standing AP both knees lateral and skyline views left knee demonstrate stable appearance following left total knee arthroplasty. Lab Results   Component Value Date    WBC 12.5 (H) 11/11/2023    HGB 10.1 (L) 11/11/2023    .0 11/11/2023      Lab Results   Component Value Date    GLU 90 10/18/2023    BUN 16 10/18/2023    CREATSERUM 0.77 10/18/2023    GFRNAA 65 01/27/2020    GFRAA 75 01/27/2020        Assessment and Plan:  Diagnoses and all orders for this visit:    Orthopedic aftercare  Comments:  Status post left total knee arthroplasty  Orders:  -     XR KNEE (3 VIEWS), LEFT (CPT=73562); Future  -     Home Health Referral - External        Assessment: Status post left total knee arthroplasty    Plan: She is progressing well following her surgery. She wants to continue with home therapy for the next few weeks. She does not own a car and has trouble with transportation. I told her that there are facilities that will provide transportation. She is can investigate places near her home. She will call us if she needs an order prior to her next visit. Will see her back in 4 weeks. Advised her to call if any questions or problems arise in the meantime.     The above note was creating using Dragon speech recognition technology. Please excuse any typos. This visit was performed under the supervision of Dr. Eli Gallegos who formulated the treatment plan and decision making.

## 2023-12-11 NOTE — TELEPHONE ENCOUNTER
Per Gladies Fergusson calling to confirm the fax was received and they need it signed and faxed back to [phone].  Please advise

## 2023-12-11 NOTE — TELEPHONE ENCOUNTER
Spoke with Stefanie Bingham and let her know we have received the fax and the MD has it to fill out and sign.

## 2023-12-12 ENCOUNTER — TELEPHONE (OUTPATIENT)
Facility: CLINIC | Age: 63
End: 2023-12-12

## 2023-12-12 NOTE — TELEPHONE ENCOUNTER
The patient called and wanted her X-Ray done at her house but the doctor has to make an order for them to come to the house instead of at the office. The doctor needs to tell them which x-ray machine to bring out and fax the order.  The place that can do it at home is :[place] [address]  [address] [address]    Phone #[phone]    Fax # [phone]

## 2023-12-12 NOTE — TELEPHONE ENCOUNTER
Patient contacted. She states she uses [facility] for PT. They told her they can do xray at home. I told her the xrays will need to be approved by insurance to use [facility]. She would most likely have to do at [facility] for coverage. Patient decided she will get xray (lumbar and thoracic spine) at our [facility] facility.      Provided her with central scheduling ph# [phone]; once she finds out which facility is closer to her home ([facility], [place], )

## 2023-12-15 ENCOUNTER — HOSPITAL ENCOUNTER (OUTPATIENT)
Dept: GENERAL RADIOLOGY | Age: 63
Discharge: HOME | End: 2023-12-15
Attending: FAMILY MEDICINE
Payer: MEDICAID

## 2023-12-15 DIAGNOSIS — S32.010D COMPRESSION FRACTURE OF L1 VERTEBRA WITH ROUTINE HEALING, SUBSEQUENT ENCOUNTER: Primary | ICD-10-CM

## 2023-12-15 DIAGNOSIS — S32.010A CLOSED COMPRESSION FRACTURE OF BODY OF L1 VERTEBRA (HCC): ICD-10-CM

## 2023-12-15 DIAGNOSIS — S22.070D COMPRESSION FRACTURE OF T9 VERTEBRA WITH ROUTINE HEALING, SUBSEQUENT ENCOUNTER: ICD-10-CM

## 2023-12-15 PROCEDURE — 72070 X-RAY EXAM THORAC SPINE 2VWS: CPT | Performed by: FAMILY MEDICINE

## 2023-12-15 PROCEDURE — 72100 X-RAY EXAM L-S SPINE 2/3 VWS: CPT | Performed by: FAMILY MEDICINE

## 2023-12-19 ENCOUNTER — TELEPHONE (OUTPATIENT)
Facility: CLINIC | Age: 63
End: 2023-12-19

## 2023-12-19 ENCOUNTER — TELEPHONE (OUTPATIENT)
Dept: ORTHOPEDICS CLINIC | Facility: CLINIC | Age: 63
End: 2023-12-19

## 2023-12-19 NOTE — TELEPHONE ENCOUNTER
Ari Edwards from [address] calling to request for Physical therapist evaluation to be signed and be fax back to them.   Fax number [phone]

## 2023-12-19 NOTE — TELEPHONE ENCOUNTER
Spoke with Ronni Harrison, unable to confirm or deny if we even received this document. Could not find in chart or in MD folder and they indicated have not received. Asked them to refax. Verbalized understanding.

## 2023-12-19 NOTE — TELEPHONE ENCOUNTER
Patient would like to know if it is ok to do the MRI lumbar spine even though she has the titanium  left knee (knee replacement ). Per [employer]  ;  \"Titanium knee replacements are compatible with MRI scans because of titanium's nonmagnetic properties. Patients with titanium implants can receive MRI scans without any problems\"        RN called MRI dept x [phone], spoke with Brittany Zavaleta, who confirmed that  TITANIUM is ok, orthopedic metal is fine.         Please reply to pool: ALESSANDRO Zamora

## 2023-12-19 NOTE — TELEPHONE ENCOUNTER
Spoke with patient and advised of Dr. Allison Starch message below  Patient verbalized understanding and had no further questions or concerns at this time

## 2023-12-30 ENCOUNTER — TELEPHONE (OUTPATIENT)
Facility: CLINIC | Age: 63
End: 2023-12-30

## 2023-12-30 DIAGNOSIS — U07.1 COVID: Primary | ICD-10-CM

## 2023-12-30 NOTE — TELEPHONE ENCOUNTER
Patient paged provider. Tested positive for covid. Feeling symptomatic since yesterday. Cough, congestion fever, etc. Is not currently taking latuta or lamical. States does not take anything on med list except occasional clonazepam. Counseled patient to try and not take clonazepam while taking paxlovid due to interaction. Patient verbalized understanding and agreement with the plan. The patient educated on disease process, medication use and side effects, and when to seek emergency care versus PCP follow up.       Lisa Duarte MD, 12/30/23, 5:22 PM

## 2024-01-11 ENCOUNTER — MED REC SCAN ONLY (OUTPATIENT)
Facility: CLINIC | Age: 64
End: 2024-01-11

## 2024-02-15 ENCOUNTER — TELEPHONE (OUTPATIENT)
Facility: CLINIC | Age: 64
End: 2024-02-15

## 2024-02-15 NOTE — TELEPHONE ENCOUNTER
Advised patient of Dr. Mueller's note. Patient verbalized understanding and declined appointment for today or tomorrow due to transportation. States she doesn't drive and would have to call her insurance 2 days prior to get a ride for appointment. Wanted to know if can be added on Tuesday at 9:30am? Please advise.

## 2024-02-15 NOTE — TELEPHONE ENCOUNTER
Future Appointments   Date Time Provider Department Center   2/20/2024  9:30 AM Mounika Mueller MD [facility]

## 2024-02-15 NOTE — TELEPHONE ENCOUNTER
If patient is willing, I would be willing to see her today or tomorrow and very likely order labs. Can double book any slot today except for the 6:30 slot. Can double book any slot tomorrow except 12:30 slot    Mounika Mueller MD, 02/15/24, 12:14 PM

## 2024-02-15 NOTE — TELEPHONE ENCOUNTER
Advised patient of Dr. Mueller's note. Patient verbalized understanding and will be there. She will call her insurance for the ride.    : please add on patient on 2/20/2024 at 9:30am with Dr Mueller. Not allowing me to book.

## 2024-02-20 ENCOUNTER — OFFICE VISIT (OUTPATIENT)
Facility: CLINIC | Age: 64
End: 2024-02-20
Payer: MEDICAID

## 2024-02-20 ENCOUNTER — LAB ENCOUNTER (OUTPATIENT)
Dept: LAB | Facility: REFERENCE LAB | Age: 64
End: 2024-02-20
Attending: FAMILY MEDICINE
Payer: MEDICAID

## 2024-02-20 ENCOUNTER — TELEPHONE (OUTPATIENT)
Facility: CLINIC | Age: 64
End: 2024-02-20

## 2024-02-20 VITALS
WEIGHT: 158.5 LBS | SYSTOLIC BLOOD PRESSURE: 110 MMHG | HEART RATE: 68 BPM | BODY MASS INDEX: 27 KG/M2 | OXYGEN SATURATION: 97 % | DIASTOLIC BLOOD PRESSURE: 70 MMHG

## 2024-02-20 DIAGNOSIS — R53.83 OTHER FATIGUE: ICD-10-CM

## 2024-02-20 DIAGNOSIS — M47.816 LUMBAR SPONDYLOSIS: ICD-10-CM

## 2024-02-20 DIAGNOSIS — Z12.11 ENCOUNTER FOR COLORECTAL CANCER SCREENING: ICD-10-CM

## 2024-02-20 DIAGNOSIS — E55.9 VITAMIN D DEFICIENCY: ICD-10-CM

## 2024-02-20 DIAGNOSIS — Z22.7 TB LUNG, LATENT: ICD-10-CM

## 2024-02-20 DIAGNOSIS — S22.070S COMPRESSION FRACTURE OF T9 VERTEBRA, SEQUELA: ICD-10-CM

## 2024-02-20 DIAGNOSIS — Z12.12 ENCOUNTER FOR COLORECTAL CANCER SCREENING: ICD-10-CM

## 2024-02-20 DIAGNOSIS — R79.89 LOW VITAMIN B12 LEVEL: ICD-10-CM

## 2024-02-20 DIAGNOSIS — M48.56XS NONTRAUMATIC COMPRESSION FRACTURE OF L1 VERTEBRA, SEQUELA: ICD-10-CM

## 2024-02-20 DIAGNOSIS — R53.83 OTHER FATIGUE: Primary | ICD-10-CM

## 2024-02-20 DIAGNOSIS — E55.9 VITAMIN D DEFICIENCY: Primary | ICD-10-CM

## 2024-02-20 DIAGNOSIS — D50.9 IRON DEFICIENCY ANEMIA, UNSPECIFIED IRON DEFICIENCY ANEMIA TYPE: ICD-10-CM

## 2024-02-20 DIAGNOSIS — R05.3 CHRONIC COUGH: ICD-10-CM

## 2024-02-20 LAB
BASOPHILS # BLD AUTO: 0.05 X10(3) UL (ref 0–0.2)
BASOPHILS NFR BLD AUTO: 0.9 %
DEPRECATED HBV CORE AB SER IA-ACNC: 47.9 NG/ML
DEPRECATED RDW RBC AUTO: 47 FL (ref 35.1–46.3)
EOSINOPHIL # BLD AUTO: 0.2 X10(3) UL (ref 0–0.7)
EOSINOPHIL NFR BLD AUTO: 3.7 %
ERYTHROCYTE [DISTWIDTH] IN BLOOD BY AUTOMATED COUNT: 13.9 % (ref 11–15)
HCT VFR BLD AUTO: 39 %
HGB BLD-MCNC: 12.8 G/DL
IMM GRANULOCYTES # BLD AUTO: 0.01 X10(3) UL (ref 0–1)
IMM GRANULOCYTES NFR BLD: 0.2 %
IRON SATN MFR SERPL: 22 %
IRON SERPL-MCNC: 66 UG/DL
LYMPHOCYTES # BLD AUTO: 1.62 X10(3) UL (ref 1–4)
LYMPHOCYTES NFR BLD AUTO: 29.9 %
MCH RBC QN AUTO: 30.2 PG (ref 26–34)
MCHC RBC AUTO-ENTMCNC: 32.8 G/DL (ref 31–37)
MCV RBC AUTO: 92 FL
MONOCYTES # BLD AUTO: 0.45 X10(3) UL (ref 0.1–1)
MONOCYTES NFR BLD AUTO: 8.3 %
NEUTROPHILS # BLD AUTO: 3.08 X10 (3) UL (ref 1.5–7.7)
NEUTROPHILS # BLD AUTO: 3.08 X10(3) UL (ref 1.5–7.7)
NEUTROPHILS NFR BLD AUTO: 57 %
PLATELET # BLD AUTO: 297 10(3)UL (ref 150–450)
RBC # BLD AUTO: 4.24 X10(6)UL
TIBC SERPL-MCNC: 305 UG/DL (ref 250–425)
TRANSFERRIN SERPL-MCNC: 205 MG/DL (ref 250–380)
TSI SER-ACNC: 1.97 MIU/ML (ref 0.55–4.78)
VIT B12 SERPL-MCNC: 296 PG/ML (ref 211–911)
VIT D+METAB SERPL-MCNC: 40.3 NG/ML (ref 30–100)
WBC # BLD AUTO: 5.4 X10(3) UL (ref 4–11)

## 2024-02-20 PROCEDURE — 36415 COLL VENOUS BLD VENIPUNCTURE: CPT

## 2024-02-20 PROCEDURE — 82607 VITAMIN B-12: CPT

## 2024-02-20 PROCEDURE — 99214 OFFICE O/P EST MOD 30 MIN: CPT | Performed by: FAMILY MEDICINE

## 2024-02-20 PROCEDURE — 84443 ASSAY THYROID STIM HORMONE: CPT

## 2024-02-20 PROCEDURE — 83540 ASSAY OF IRON: CPT

## 2024-02-20 PROCEDURE — 86480 TB TEST CELL IMMUN MEASURE: CPT

## 2024-02-20 PROCEDURE — 82306 VITAMIN D 25 HYDROXY: CPT

## 2024-02-20 PROCEDURE — 84466 ASSAY OF TRANSFERRIN: CPT

## 2024-02-20 PROCEDURE — 85025 COMPLETE CBC W/AUTO DIFF WBC: CPT

## 2024-02-20 PROCEDURE — 82728 ASSAY OF FERRITIN: CPT

## 2024-02-20 RX ORDER — MELOXICAM 15 MG/1
15 TABLET ORAL DAILY
Qty: 30 TABLET | Refills: 0 | Status: SHIPPED | OUTPATIENT
Start: 2024-02-20

## 2024-02-20 RX ORDER — CLONAZEPAM 2 MG/1
TABLET ORAL
Refills: 0 | Status: CANCELLED | OUTPATIENT
Start: 2024-02-20

## 2024-02-20 RX ORDER — MELATONIN
325
Qty: 20 TABLET | Refills: 0 | Status: SHIPPED | OUTPATIENT
Start: 2024-02-20

## 2024-02-20 RX ORDER — METHOCARBAMOL 750 MG/1
750 TABLET, FILM COATED ORAL 3 TIMES DAILY
Qty: 90 TABLET | Refills: 0 | Status: SHIPPED | OUTPATIENT
Start: 2024-02-20

## 2024-02-20 NOTE — PROGRESS NOTES
Subjective:   Inga Rizo is a 63 year old female who presents for Fatigue (Patient complains of feeling tired, dry cough, and weight gain x 6 months.  )     Patient presents for fatigue and dry cough for 6 months. Smokes one cigarette per month. Does also have history of latent TB but was treated in the past. Has also noted weight gain. There is a family history of hypothyroid. Has also has knee total knee replacement and issue with spinal stenosis and fractured vertebra which limits exercise. Get plenty of sleep. Patient wakes up fatigued. Does not wake up with headaches. Does note more snoring as has gained weight. No apnea.     History/Other:    Chief Complaint Reviewed and Verified  Nursing Notes Reviewed and   Verified  Tobacco Reviewed  Allergies Reviewed  Medications Reviewed    Medical History Reviewed  Surgical History Reviewed  Family History   Reviewed  Social History Reviewed         Tobacco:  She smoked tobacco in the past but quit greater than 12 months ago.  Social History    Tobacco Use      Smoking status: Former        Types: Cigarettes        Quit date: 2010        Years since quittin.1      Smokeless tobacco: Never       Current Outpatient Medications   Medication Sig Dispense Refill    ferrous sulfate 325 (65 FE) MG Oral Tab EC Take 1 tablet (325 mg total) by mouth daily with breakfast. 20 tablet 0    methocarbamol 750 MG Oral Tab Take 1 tablet (750 mg total) by mouth 3 (three) times daily. 90 tablet 0    Meloxicam 15 MG Oral Tab Take 1 tablet (15 mg total) by mouth daily. 30 tablet 0    docusate sodium 100 MG Oral Cap Take 100 mg by mouth 2 (two) times daily. 20 capsule 0    Acetaminophen 500 MG Oral Cap Take 1-2 capsules (500-1,000 mg total) by mouth every 6 (six) hours as needed for Pain. 30 capsule 0    lamoTRIgine 200 MG Oral Tab Take 1 tablet (200 mg total) by mouth daily as needed.      STIMULANT LAXATIVE 8.6-50 MG Oral Tab Take 1 tablet by mouth 2 (two) times daily.       LATUDA 60 MG Oral Tab Take 1 tablet (60 mg total) by mouth daily as needed.  2    ClonazePAM 2 MG Oral Tab Take 1 tablet 3 x a day           Review of Systems:  Review of Systems   Constitutional:  Positive for fatigue.   Respiratory:  Positive for cough.    Cardiovascular: Negative.    Gastrointestinal: Negative.    Skin: Negative.    Neurological: Negative.        Objective:   /70 (BP Location: Left arm, Patient Position: Sitting, Cuff Size: adult)   Pulse 68   Wt 158 lb 8 oz (71.9 kg)   SpO2 97%   BMI 27.21 kg/m²  Estimated body mass index is 27.21 kg/m² as calculated from the following:    Height as of 11/10/23: 5' 4\" (1.626 m).    Weight as of this encounter: 158 lb 8 oz (71.9 kg).  Wt Readings from Last 4 Encounters:   02/20/24 158 lb 8 oz (71.9 kg)   11/10/23 151 lb (68.5 kg)   10/18/23 145 lb (65.8 kg)   08/28/23 140 lb 9.6 oz (63.8 kg)       Physical Exam  Vitals and nursing note reviewed.   Constitutional:       General: She is not in acute distress.     Appearance: Normal appearance.   HENT:      Head: Normocephalic and atraumatic.   Eyes:      General:         Right eye: No discharge.         Left eye: No discharge.      Comments: Extraocular eye movements grossly intact   Cardiovascular:      Rate and Rhythm: Normal rate and regular rhythm.      Pulses: Normal pulses.      Heart sounds: Normal heart sounds. No murmur heard.     No friction rub. No gallop.   Pulmonary:      Effort: Pulmonary effort is normal. No respiratory distress.      Breath sounds: Normal breath sounds. No stridor. No wheezing, rhonchi or rales.   Musculoskeletal:      Right lower leg: No edema.      Left lower leg: No edema.      Comments: Normal gait   Skin:     Findings: No rash.   Neurological:      General: No focal deficit present.      Mental Status: She is alert. Mental status is at baseline.   Psychiatric:         Mood and Affect: Mood normal.         Behavior: Behavior normal.         Assessment & Plan:   1.  Other fatigue (Primary)  -     CBC With Differential With Platelet; Future; Expected date: 02/20/2024  -     Iron And Tibc; Future; Expected date: 02/20/2024  -     Ferritin; Future; Expected date: 02/20/2024  -     Vitamin B12; Future; Expected date: 02/20/2024  -     TSH W Reflex To Free T4; Future; Expected date: 02/20/2024    -will begin workup with labs as ordered. If labs normal given history of snoring will order sleep study.     2. Encounter for colorectal cancer screening  -     [facility] GI Telephone Colon Screen; Future; Expected date: 02/27/2024    -strongly encouraged patient to get screening done this year     3. TB lung, latent, 7. Chronic cough  -     Quantiferon TB Plus; Future; Expected date: 02/20/2024    -given chronic cough and fatigue ordered quant gold. If positive will proceed with CXR. Patient was treated for latent TB remotely per patient report    4. Nontraumatic compression fracture of L1 vertebra, sequela,5. Compression fracture of T9 vertebra, sequela, 6. Lumbar spondylosis  -     Methocarbamol; Take 1 tablet (750 mg total) by mouth 3 (three) times daily.  Dispense: 90 tablet; Refill: 0  -     Meloxicam; Take 1 tablet (15 mg total) by mouth daily.  Dispense: 30 tablet; Refill: 0    -stable. Pain well controlled on current regimen. Refilled      8. Iron deficiency anemia, unspecified iron deficiency anemia type  -     Ferrous Sulfate; Take 1 tablet (325 mg total) by mouth daily with breakfast.  Dispense: 20 tablet; Refill: 0    Patient does have history of anemia. Refilled iron. Ordered updated CBC and iron levels. Again strongly encouraged patient to get colonoscopy to rule out other malignant reasons for anemia      Return pending results.    Mounika Mueller MD, 2/20/2024, 9:39 AM

## 2024-02-20 NOTE — TELEPHONE ENCOUNTER
Patient labs so far only notable for borderline low b12 just under 300. Otherwise no anemia. Normal iron levels. Normal vitamin D. TB testing pending. Will call when Quant gold results    Mounika Mueller MD, 02/20/24, 2:45 PM    TB testing normal. Called patient. Informed her of plan to trial b12 and recheck levels in 1 month. Patient verbalized understanding of plan    Mounika Mueller MD, 02/22/24, 3:28 PM

## 2024-02-22 LAB
M TB IFN-G CD4+ T-CELLS BLD-ACNC: 0 IU/ML
M TB TUBERC IFN-G BLD QL: NEGATIVE
M TB TUBERC IGNF/MITOGEN IGNF CONTROL: 8.7 IU/ML
QFT TB1 AG MINUS NIL: 0.07 IU/ML
QFT TB2 AG MINUS NIL: 0.04 IU/ML

## 2024-03-04 ENCOUNTER — TELEPHONE (OUTPATIENT)
Facility: CLINIC | Age: 64
End: 2024-03-04

## 2024-03-04 NOTE — TELEPHONE ENCOUNTER
Dr Akers:   Per patient,  psych Dr. España wants a letter Faxed over so she can get her psych meds.     Patient stating  she cannot sit for prolong period of time. \"Longer than 10 minutes.\" From [facility] to [facility]. (Cannot go drive to see her Psych who is in [facility])  Patient asking if letter can state medical conditions: Spinal stenosis, knee replacement. Hx of fracture thoracic vertebra; and would like letter to state she is bed ridden with no means of transportation.     Patient states she cannot drive from [facility] to [facility] because of her medical conditions. She is in a lot of pain and cannot sit long period of time.     Hx of bipolar  Dr España #Fax [phone]  Patient also would want a copy of letter mailed to her home address.        (Patient is in process in getting MRI scheduled)

## 2024-03-21 ENCOUNTER — TELEPHONE (OUTPATIENT)
Facility: CLINIC | Age: 64
End: 2024-03-21

## 2024-03-21 DIAGNOSIS — H25.13 AGE-RELATED NUCLEAR CATARACT OF BOTH EYES: Primary | ICD-10-CM

## 2024-03-21 DIAGNOSIS — H52.4 HYPEROPIA OF BOTH EYES WITH ASTIGMATISM AND PRESBYOPIA: ICD-10-CM

## 2024-03-21 DIAGNOSIS — H52.03 HYPEROPIA OF BOTH EYES WITH ASTIGMATISM AND PRESBYOPIA: ICD-10-CM

## 2024-03-21 DIAGNOSIS — H52.203 HYPEROPIA OF BOTH EYES WITH ASTIGMATISM AND PRESBYOPIA: ICD-10-CM

## 2024-03-21 NOTE — TELEPHONE ENCOUNTER
Dr. Akers-Patient is requesting a referral for an ophthalmologist that can perform a laser surgery for cataract removal and \"not a scalpel.\" Patient states that you find the best doctors. Please advise and thank you.      Patient went to [facility] on Monday 3/18  Was advised has \"very bad cataracts\" and will go blind in 2 years  Will be having the report faxed to the office      Patient notified that PCP is out of the office; verbalized understanding and requested for this encounter to go to PCP.

## 2024-03-22 NOTE — TELEPHONE ENCOUNTER
Called patient to discuss repeating B12 level. Had not seen this request when speaking with patient and patient did not bring up request. Apologies. Can try   Cheryl Winn MD    [facility], [address] [address] [phone]    Does laser.    Dr Mueller

## 2024-03-23 NOTE — TELEPHONE ENCOUNTER
Signed referral thank you for pending please inform patient    Mounika Mueller MD, 03/23/24, 11:55 AM

## 2024-05-09 NOTE — H&P
[facility] - Gastroenterology                                                                                                  Clinic History and Physical     Chief Complaint   Patient presents with    Constipation    Colonoscopy Screening       Requesting physician or provider: Sayda Akers DO    HPI:   Inga Rizo is a 64 year old year-old female with active diagnoses including bipolar affective disorder, anxiety, compression fracture, migraine, arthritis, diverticulosis, hiatal hernia, GERD, chronic constipation. latent TB, MGD. Prior medical/surgical hx in table below. The patient presents for abd pain and colon cancer screening evaluation.    #abd pain  #constipation  -bloating  -reports chronic constipation worsening 3 years. Abdomen is constantly distended, bloating and with generalized pain. Bowel movement 1x weekly of hard thin pieces of stoolShe drinks a lot of water and eats fruit, veggies, yogurt. She takes plant based herbal supplement for constipation, dulcolax every day.   -over the past 3 years the patient has tried many over-the-counter therapies including stool softener such as Colace, fiber supplements, MiraLAX, magnesium products such as milk of magnesia and magnesium citrate, Senokot/bisacodyl.      Prior GI visits   -April 2019 Dr Jerome for constipation, abd pain, bloating  -October 2015 Dr. Harvey for GERD, hiatal hernia, hx H. Pylori, diverticulosis  Patient is here today as a referral from her PCP for evaluation prior to undergoing colonoscopy for CRC screening. Patient denies any GI symptoms of nausea, vomiting, heartburn, dyspepsia, dysphagia, hematemesis, abdominal pain, change in bowel habits, constipation, diarrhea, hematochezia, or melena.  Additionally there is no unintentional weight loss.    Pt is due for CRC screening. We discussed the available screening options for CRC such as FIT and cologuard. They are electing to pursue colonoscopy at this time.     Last colonoscopy:  2012 Dr. Harvey - 10 year recall  -diverticulosis  -hemorrhoids  Last EGD:  Dr. Harvey  +H. Pylori gastritis  IMPRESSION:  1. Mild gastritis.  2. Hiatal hernia.    NSAIDS: none   Tobacco: former  Alcohol: none   Marijuana: none   Illicit drugs: none     FH GI malignancy: maternal & paternal grandpas - colon cancer, paternal uncle - colon cancer, paternal aunt - liver CA    No history of adverse reaction to sedation  No DANILO  No anticoagulants/antiplatelet  No pacemaker/defibrillator  No pain medications and/or sleep aides    Wt Readings from Last 6 Encounters:   05/10/24 163 lb (73.9 kg)   24 158 lb 8 oz (71.9 kg)   11/10/23 151 lb (68.5 kg)   10/18/23 145 lb (65.8 kg)   23 140 lb 9.6 oz (63.8 kg)   22 140 lb (63.5 kg)          History, Medications, Allergies, ROS:      Past Medical History:    Anxiety state    Back pain    medical management of pain    Back problem    Cataract    Constipation    Depression    Diverticulosis    Gastritis    GERD (gastroesophageal reflux disease)    Headache    Hiatal hernia    History of Helicobacter pylori infection    Migraines    Osteoarthritis    Visual impairment    glasses      Past Surgical History:   Procedure Laterality Date    Appendectomy      Bone replacement graft            x2    Colonoscopy  2012    D & c  2000    Egd  2012    Hysterectomy      TVH    Hysteroscopy      Michelle localization wire 1 site right (cpt=19281)      Other surgical history      cancer mole removed    Other surgical history      removed ovarires      Family Hx:   Family History   Problem Relation Age of Onset    Cancer Father 35        lung ca    Breast Cancer Maternal Grandmother 50        d.55    Cancer Maternal Grandfather 68        colon ca    Cancer Mother 59        non-Hodgkins lymphoma (cause of death)    Colon Cancer Maternal Uncle 59        (cause of death)    Cancer Maternal Uncle 60        Hodgkins lymphoma    Cancer Paternal Aunt         liver (cause  of death)    Breast Cancer Paternal Grandmother 71    Cancer Paternal Grandfather 68        colon ca    Ovarian Cancer Paternal Aunt     Breast Cancer Paternal Aunt     Breast Cancer Paternal Cousin Female 41        d.50    Ovarian Cancer Paternal Cousin Female 38        d.45    Cancer Paternal Uncle         colon ca    Cancer Paternal Uncle         brain ca    Cancer Brother 61        Hodgkins    Diabetes Neg     Glaucoma Neg     Macular degeneration Neg       Social History:   Social History     Socioeconomic History    Marital status: Single   Tobacco Use    Smoking status: Former     Current packs/day: 0.00     Types: Cigarettes     Quit date: 2010     Years since quittin.3    Smokeless tobacco: Never   Vaping Use    Vaping status: Never Used   Substance and Sexual Activity    Alcohol use: No     Alcohol/week: 0.0 standard drinks of alcohol    Drug use: No   Other Topics Concern    Caffeine Concern Yes     Comment: coffee, 3 cups daily    Exercise No    Pt has a pacemaker No    Pt has a defibrillator No    Reaction to local anesthetic No        Medications (Active prior to today's visit):  Current Outpatient Medications   Medication Sig Dispense Refill    linaCLOtide (LINZESS) 145 MCG Oral Cap Take 145 mcg by mouth daily. 90 capsule 0    polyethylene glycol, PEG 3350-KCl-NaBcb-NaCl-NaSulf, 236 g Oral Recon Soln Take 4,000 mL by mouth As Directed. Take 2,000 mL the night before your procedure and 2,000 mL the morning of your procedure. Take as directed by GI clinic. Okay to substitute for generic. 1 each 0    Cyanocobalamin 1000 MCG Oral Cap Take 1 capsule by mouth daily. 90 capsule 1    cholecalciferol 125 MCG (5000 UT) Oral Cap Take 1 capsule (5,000 Units total) by mouth daily. 90 capsule 3    ferrous sulfate 325 (65 FE) MG Oral Tab EC Take 1 tablet (325 mg total) by mouth daily with breakfast. 20 tablet 0    methocarbamol 750 MG Oral Tab Take 1 tablet (750 mg total) by mouth 3 (three) times daily. 90  tablet 0    Meloxicam 15 MG Oral Tab Take 1 tablet (15 mg total) by mouth daily. 30 tablet 0    docusate sodium 100 MG Oral Cap Take 100 mg by mouth 2 (two) times daily. 20 capsule 0    Acetaminophen 500 MG Oral Cap Take 1-2 capsules (500-1,000 mg total) by mouth every 6 (six) hours as needed for Pain. 30 capsule 0    lamoTRIgine 200 MG Oral Tab Take 1 tablet (200 mg total) by mouth daily as needed.      STIMULANT LAXATIVE 8.6-50 MG Oral Tab Take 1 tablet by mouth 2 (two) times daily.      LATUDA 60 MG Oral Tab Take 1 tablet (60 mg total) by mouth daily as needed.  2    ClonazePAM 2 MG Oral Tab Take 1 tablet 3 x a day         Allergies:  No Known Allergies    ROS:   CONSTITUTIONAL: negative for fevers, chills, sweats  EYES Negative for scleral icterus or redness, and diplopia  HEENT: Negative for hoarseness  RESPIRATORY: Negative for cough and severe shortness of breath  CARDIOVASCULAR: Negative for crushing sub-sternal chest pain  GASTROINTESTINAL: See HPI  GENITOURINARY: Negative for dysuria  MUSCULOSKELETAL: Negative for arthralgias and myalgias  SKIN: Negative for jaundice, rash or pruritus  NEUROLOGICAL: Negative for dizziness and headaches  BEHAVIOR/PSYCH: Negative for psychotic behavior      PHYSICAL EXAM:   Blood pressure 112/72, pulse 63, height 5' 4\" (1.626 m), weight 163 lb (73.9 kg).    GEN: Alert, no acute distress, well-nourished   HEENT: anicteric sclera, neck supple, trachea midline, MMM, no palpable or tender neck or supraclavicular lymph nodes  CV: RRR, the extremities are warm and well perfused   LUNGS: No increased work of breathing, CTAB  ABDOMEN: +generalized tenderness with moderate distention. Soft, symmetrical,  without guarding. No lesions. Aorta is without bruit or visible pulsation. Umbilicus is midline without herniation. Normoactive bowel sounds are present, No masses, hepatomegaly or splenomegaly noted.  MSK: No erythema, no warmth, no swelling of joints  SKIN: No jaundice, no  erythema, no rashes, no lesions  HEMATOLOGIC: No bleeding, no bruising  NEURO: Alert and interactive, VILLANUEVA  PSYCH: appropriate mood & affect    Labs/Imaging:     Patient's labs and imaging were reviewed and discussed with patient today.    .  ASSESSMENT/PLAN:   Inga Rizo is a 64 year old year-old female with active diagnoses including bipolar affective disorder, anxiety, compression fracture, migraine, arthritis, diverticulosis, hiatal hernia, GERD, chronic constipation. latent TB, MGD. Prior medical/surgical hx in table below. The patient presents for abd pain and colon cancer screening evaluation.    #abd pain  #constipation  -bloating  -reports chronic constipation worsening 3 years. Abdomen is constantly distended, bloating and with generalized pain. Bowel movement 1x weekly of hard thin pieces of stoolShe drinks a lot of water and eats fruit, veggies, yogurt. She takes plant based herbal supplement for constipation, dulcolax every day.   -over the past 3 years the patient has tried many over-the-counter therapies including stool softener such as Colace, fiber supplements, MiraLAX, magnesium products such as milk of magnesia and magnesium citrate, Senokot/bisacodyl.      -symptoms likely functional irritable bowel syndrome with constipation given chronicity and prior negative work up.  She has abdominal bloating associate with her symptoms and therefore is not a good candidate for lactulose.  There is well described literature supporting the use of Linzess for treatment of chronic idiopathic constipation.  Given the patient's symptoms despite use of the above medications the next best step in management is Linzess.  This should be approved and is being prescribed for management.    Plan: initiate linzess in conjunction with bowel regimen in AVS. Xray abdomen now to assess stool burden and consider bowel wash out pending result. Colonoscopy for CRC screen recall.    #high risk screening: patient has multiple  extended family history of colon cancer and is considered high risk for colorectal cancer. Patient is currently asymptomatic and denies diarrhea, hematochezia, thin-stools or weight loss. We discussed risks/benefits/alternatives to procedure, including CT colonography and stool testing, they want to proceed with colonoscopy.  -No anemia noted on lab work feb 2024.  -maternal & paternal grandpas - colon cancer, paternal uncle - colon cancer, paternal aunt - liver CA      Recommend:  --schedule xray abdomen     -if/when linzess is covered start taking daily     -also can continue your supplement. I recommend you add back in the following:  -fiber supplement like metamucil daily  -colace stool softener 2x daily   -miralax 2x daily  -use glycerin suppository or enema as needed for hard stool in rectum  -use dulcolax once-twice month as needed for worsening constipation    **^^these medications can all be bought over the counter    -follow up with me in 3 months for update    1. Schedule colonoscopy with Dr. Jerome  Diagnosis: CRC screen, chronic constipation, family hx colon CA  Sedation: MAC  Prep: split dose golytely    HOLD Iron 1 week prior to procedure    -Anti-platelets and anti-coagulants: N/A   -Diabetes meds: N/A       Colonoscopy consent: I have discussed the risks, benefits, and alternatives to colonoscopy with the patient [who demonstrated understanding], including but not limited to the risks of bleeding, infection, pain, as well as the risks of anesthesia and perforation all leading to prolonged hospitalization, surgical intervention. I also specifically mentioned the miss rate of colonoscopy of 5-10% in the best of all circumstances. All questions were answered to the patient’s satisfaction. The patient elected to proceed with colonoscopy with intervention [i.e. polypectomy, etc.] as indicated.    Orders This Visit:  No orders of the defined types were placed in this encounter.      Meds This  Visit:  Requested Prescriptions     Signed Prescriptions Disp Refills    linaCLOtide (LINZESS) 145 MCG Oral Cap 90 capsule 0     Sig: Take 145 mcg by mouth daily.    polyethylene glycol, PEG 3350-KCl-NaBcb-NaCl-NaSulf, 236 g Oral Recon Soln 1 each 0     Sig: Take 4,000 mL by mouth As Directed. Take 2,000 mL the night before your procedure and 2,000 mL the morning of your procedure. Take as directed by GI clinic. Okay to substitute for generic.       Imaging & Referrals:  XR ABDOMEN, OBSTRUCTIVE SERIES 3 VIEWS(VZF=[id number])       ZAK Espinoza    [facility]  5/10/2024      The dictation was partially prepared using Dragon Medical voice recognition software. As a result, errors may occur. When identified, these errors have been corrected. While every attempt is made to correct errors during dictation, discrepancies may still exist.

## 2024-05-10 ENCOUNTER — OFFICE VISIT (OUTPATIENT)
Facility: CLINIC | Age: 64
End: 2024-05-10

## 2024-05-10 ENCOUNTER — TELEPHONE (OUTPATIENT)
Facility: CLINIC | Age: 64
End: 2024-05-10

## 2024-05-10 VITALS
WEIGHT: 163 LBS | HEIGHT: 64 IN | HEART RATE: 63 BPM | SYSTOLIC BLOOD PRESSURE: 112 MMHG | DIASTOLIC BLOOD PRESSURE: 72 MMHG | BODY MASS INDEX: 27.83 KG/M2

## 2024-05-10 DIAGNOSIS — K58.1 IRRITABLE BOWEL SYNDROME WITH CONSTIPATION: ICD-10-CM

## 2024-05-10 DIAGNOSIS — Z80.0 FAMILY HISTORY OF COLON CANCER: ICD-10-CM

## 2024-05-10 DIAGNOSIS — K59.09 CHRONIC CONSTIPATION: Primary | ICD-10-CM

## 2024-05-10 DIAGNOSIS — Z12.11 COLON CANCER SCREENING: ICD-10-CM

## 2024-05-10 PROCEDURE — 99204 OFFICE O/P NEW MOD 45 MIN: CPT

## 2024-05-10 RX ORDER — LINACLOTIDE 145 UG/1
145 CAPSULE, GELATIN COATED ORAL DAILY
Qty: 90 CAPSULE | Refills: 0 | Status: SHIPPED | OUTPATIENT
Start: 2024-05-10 | End: 2024-08-08

## 2024-05-10 NOTE — TELEPHONE ENCOUNTER
Current Outpatient Medications   Medication Sig Dispense Refill    linaCLOtide (LINZESS) 145 MCG Oral Cap Take 145 mcg by mouth daily. 90 capsule 0       Key: [id number]

## 2024-05-10 NOTE — PATIENT INSTRUCTIONS
-schedule xray abdomen     -if/when linzess is covered start taking daily     -also can continue your supplement. I recommend you add back in the following:  -fiber supplement like metamucil daily  -colace stool softener 2x daily   -miralax 2x daily  -use glycerin suppository or enema as needed for hard stool in rectum  -use dulcolax once-twice month as needed for worsening constipation    **^^these medications can all be bought over the counter    -follow up with me in 3 months for update    1. Schedule colonoscopy with Dr. Jerome  Diagnosis: CRC screen, chronic constipation  Sedation: MAC  Prep: split dose golytely    HOLD Iron 1 week prior to procedure    2.  bowel prep from pharmacy   You can pick the bowel prep up now and store in a cool, dry place in your home until your scheduled bowel prep start date.    3. Continue all medications as normal for your procedure. DO NOT TAKE: Any form of alcohol, recreational drugs and any forms of erectile dysfunction medications 24 hours prior to procedure.    4. Read all bowel prep instructions carefully. Bowel prep instructions can also be found online at:  www.[facility].org/giprep     5. AVOID seeds, nuts, popcorn, raw fruits and vegetables for 5 days before procedure    6. If you start any NEW medication after your visit today, please notify us. Certain medications (like iron or weight loss medications) will need to be held before the procedure, or the procedure cannot be performed safely.

## 2024-05-13 ENCOUNTER — TELEPHONE (OUTPATIENT)
Facility: CLINIC | Age: 64
End: 2024-05-13

## 2024-05-13 DIAGNOSIS — Z12.11 SCREENING FOR COLON CANCER: Primary | ICD-10-CM

## 2024-05-13 DIAGNOSIS — K59.00 CONSTIPATION, UNSPECIFIED CONSTIPATION TYPE: ICD-10-CM

## 2024-05-13 NOTE — TELEPHONE ENCOUNTER
Scheduled for:  Colonoscopy 58344  Provider Name:    Date:  8/9/2024  Location:  [facility]  Sedation:  MAC  Time:  12:30 pm, (pt is aware to arrive at 11:30 am)  Prep:  Golytely  Meds/Allergies Reconciled?: Citlali/ZAK reviewed.   Diagnosis with codes:  Screening for Colon Cancer Z12.11, Chronic Constipation K59.00  Was patient informed to call insurance with codes (Y/N): Yes    Referral sent?:  Referral was sent at the time of electronic surgical scheduling.  EM or [facility] notified?: I sent an electronic request to Endo Scheduling and received a confirmation today.    Medication Orders:  Patient is aware to hold iron 7 days prior to procedure.  Misc Orders:  Patient is aware to NOT take iron pills, herbal meds and diet supplements for 7 days before exam. Also to NOT take any form of alcohol, recreational drugs and any forms of ED meds 24-72 hours before exam.      Further instructions given by staff: I discussed the prep instructions with the patient which she verbally understood. Provided patient with prep instructions and cancellation policy at the time of office visit.

## 2024-05-13 NOTE — TELEPHONE ENCOUNTER
Received approval letter from [facility]    Effective from 2/10/2024-5/10/2025    Case #[id number]    I spoke to Claudia (pharmacist) from [facility] pharmacy.    She stated they will notify the patient once ready for  for no co pay.    Called and spoke to the patient, date of birth and name verified.    She was notified of the above.    The patient verbalized understanding.

## 2024-05-29 ENCOUNTER — EKG ENCOUNTER (OUTPATIENT)
Dept: LAB | Age: 64
End: 2024-05-29
Attending: FAMILY MEDICINE
Payer: MEDICAID

## 2024-05-29 ENCOUNTER — LAB ENCOUNTER (OUTPATIENT)
Dept: LAB | Age: 64
End: 2024-05-29
Attending: FAMILY MEDICINE
Payer: MEDICAID

## 2024-05-29 ENCOUNTER — OFFICE VISIT (OUTPATIENT)
Facility: CLINIC | Age: 64
End: 2024-05-29

## 2024-05-29 VITALS
OXYGEN SATURATION: 99 % | BODY MASS INDEX: 27.83 KG/M2 | WEIGHT: 163 LBS | SYSTOLIC BLOOD PRESSURE: 124 MMHG | HEIGHT: 64 IN | HEART RATE: 57 BPM | DIASTOLIC BLOOD PRESSURE: 76 MMHG

## 2024-05-29 DIAGNOSIS — Z01.818 PREOP EXAMINATION: Primary | ICD-10-CM

## 2024-05-29 DIAGNOSIS — F31.60 MIXED BIPOLAR I DISORDER (HCC): ICD-10-CM

## 2024-05-29 DIAGNOSIS — R79.89 LOW VITAMIN B12 LEVEL: ICD-10-CM

## 2024-05-29 DIAGNOSIS — M47.816 LUMBAR SPONDYLOSIS: ICD-10-CM

## 2024-05-29 DIAGNOSIS — H25.9 SENILE CATARACT OF RIGHT EYE, UNSPECIFIED AGE-RELATED CATARACT TYPE: ICD-10-CM

## 2024-05-29 DIAGNOSIS — Z01.818 PREOP EXAMINATION: ICD-10-CM

## 2024-05-29 LAB
ANION GAP SERPL CALC-SCNC: 6 MMOL/L (ref 0–18)
ATRIAL RATE: 54 BPM
BASOPHILS # BLD AUTO: 0.04 X10(3) UL (ref 0–0.2)
BASOPHILS NFR BLD AUTO: 0.6 %
BUN BLD-MCNC: 17 MG/DL (ref 9–23)
BUN/CREAT SERPL: 20.5 (ref 10–20)
CALCIUM BLD-MCNC: 9.3 MG/DL (ref 8.7–10.4)
CHLORIDE SERPL-SCNC: 110 MMOL/L (ref 98–112)
CO2 SERPL-SCNC: 27 MMOL/L (ref 21–32)
CREAT BLD-MCNC: 0.83 MG/DL
DEPRECATED RDW RBC AUTO: 43.7 FL (ref 35.1–46.3)
EGFRCR SERPLBLD CKD-EPI 2021: 79 ML/MIN/1.73M2 (ref 60–?)
EOSINOPHIL # BLD AUTO: 0.12 X10(3) UL (ref 0–0.7)
EOSINOPHIL NFR BLD AUTO: 1.7 %
ERYTHROCYTE [DISTWIDTH] IN BLOOD BY AUTOMATED COUNT: 12.9 % (ref 11–15)
FASTING STATUS PATIENT QL REPORTED: NO
GLUCOSE BLD-MCNC: 90 MG/DL (ref 70–99)
HCT VFR BLD AUTO: 42 %
HGB BLD-MCNC: 13.4 G/DL
IMM GRANULOCYTES # BLD AUTO: 0.02 X10(3) UL (ref 0–1)
IMM GRANULOCYTES NFR BLD: 0.3 %
LYMPHOCYTES # BLD AUTO: 1.77 X10(3) UL (ref 1–4)
LYMPHOCYTES NFR BLD AUTO: 25.4 %
MCH RBC QN AUTO: 29.5 PG (ref 26–34)
MCHC RBC AUTO-ENTMCNC: 31.9 G/DL (ref 31–37)
MCV RBC AUTO: 92.5 FL
MONOCYTES # BLD AUTO: 0.44 X10(3) UL (ref 0.1–1)
MONOCYTES NFR BLD AUTO: 6.3 %
NEUTROPHILS # BLD AUTO: 4.59 X10 (3) UL (ref 1.5–7.7)
NEUTROPHILS # BLD AUTO: 4.59 X10(3) UL (ref 1.5–7.7)
NEUTROPHILS NFR BLD AUTO: 65.7 %
OSMOLALITY SERPL CALC.SUM OF ELEC: 297 MOSM/KG (ref 275–295)
P AXIS: 45 DEGREES
P-R INTERVAL: 130 MS
PLATELET # BLD AUTO: 355 10(3)UL (ref 150–450)
POTASSIUM SERPL-SCNC: 4.2 MMOL/L (ref 3.5–5.1)
Q-T INTERVAL: 430 MS
QRS DURATION: 82 MS
QTC CALCULATION (BEZET): 407 MS
R AXIS: 18 DEGREES
RBC # BLD AUTO: 4.54 X10(6)UL
SODIUM SERPL-SCNC: 143 MMOL/L (ref 136–145)
T AXIS: 43 DEGREES
VENTRICULAR RATE: 54 BPM
VIT B12 SERPL-MCNC: 374 PG/ML (ref 211–911)
WBC # BLD AUTO: 7 X10(3) UL (ref 4–11)

## 2024-05-29 PROCEDURE — 82607 VITAMIN B-12: CPT

## 2024-05-29 PROCEDURE — 93010 ELECTROCARDIOGRAM REPORT: CPT | Performed by: INTERNAL MEDICINE

## 2024-05-29 PROCEDURE — 80048 BASIC METABOLIC PNL TOTAL CA: CPT

## 2024-05-29 PROCEDURE — 93005 ELECTROCARDIOGRAM TRACING: CPT

## 2024-05-29 PROCEDURE — 36415 COLL VENOUS BLD VENIPUNCTURE: CPT

## 2024-05-29 PROCEDURE — 85025 COMPLETE CBC W/AUTO DIFF WBC: CPT

## 2024-05-29 PROCEDURE — 99214 OFFICE O/P EST MOD 30 MIN: CPT | Performed by: FAMILY MEDICINE

## 2024-05-29 NOTE — PROGRESS NOTES
Inga Rizo is a 64 year old female.  Chief Complaint   Patient presents with    Pre-Op Exam     Right eye cataract       HPI:   PREOP EXAM    PRE-OP Physical  What is the full name of procedure/ surgery? Right eye cataract surgery  Date surgery or procedure is being done? 2024  What is the doctor’s full name  that is doing the surgery? Dr. Mike Kee  What hospital or facility will the procedure occur? [facility]  Will be having cataract surgery under general anesthesia. Did have knee replacement surgery in the fall, and did very well without any complications from the anesthesia. Also denies any family history of malignant hyperthermia.   ALLERGIES:  No Known Allergies    Immunization History   Administered Date(s) Administered    Covid-19 Vaccine Pfizer 30 mcg/0.3 ml 2021, 2021, 2021    FLULAVAL 6 months & older 0.5 ml Prefilled syringe (61514) 2022    FLUZONE 6 months and older PFS 0.5 ml (71355) 2018, 10/18/2023    TDAP 2022       Past Surgical History:   Procedure Laterality Date    Appendectomy      Bone replacement graft            x2    Colonoscopy  2012    D & c      Egd      Hysterectomy      TVH    Hysteroscopy      Michelle localization wire 1 site right (cpt=19281)      Other surgical history      cancer mole removed    Other surgical history      removed ovarires    Total knee replacement Left 11/10/2023     Family Status   Relation Status    Fa     MGMA     MGFA     Mo     Maternal Unc     Pat Aunt     PGMA     PGFA     Pat Aunt     Pat Cous Fem     Pat Cous Fem     Paternal Unc     Paternal Unc     Mat Aunt Alive    Bro (Not Specified)    NEG (Not Specified)     Family History   Problem Relation Age of Onset    Cancer Father 35        lung ca    Breast Cancer Maternal Grandmother 50        d.55    Cancer Maternal  Grandfather 68        colon ca    Cancer Mother 59        non-Hodgkins lymphoma (cause of death)    Colon Cancer Maternal Uncle 59        (cause of death)    Cancer Maternal Uncle 60        Hodgkins lymphoma    Cancer Paternal Aunt         liver (cause of death)    Breast Cancer Paternal Grandmother 71    Cancer Paternal Grandfather 68        colon ca    Ovarian Cancer Paternal Aunt     Breast Cancer Paternal Aunt     Breast Cancer Paternal Cousin Female 41        d.50    Ovarian Cancer Paternal Cousin Female 38        d.45    Cancer Paternal Uncle         colon ca    Cancer Paternal Uncle         brain ca    Cancer Brother 61        Hodgkins    Diabetes Neg     Glaucoma Neg     Macular degeneration Neg        Current Outpatient Medications   Medication Sig Dispense Refill    linaCLOtide (LINZESS) 145 MCG Oral Cap Take 145 mcg by mouth daily. 90 capsule 0    Cyanocobalamin 1000 MCG Oral Cap Take 1 capsule by mouth daily. 90 capsule 1    ferrous sulfate 325 (65 FE) MG Oral Tab EC Take 1 tablet (325 mg total) by mouth daily with breakfast. 20 tablet 0    methocarbamol 750 MG Oral Tab Take 1 tablet (750 mg total) by mouth 3 (three) times daily. 90 tablet 0    Meloxicam 15 MG Oral Tab Take 1 tablet (15 mg total) by mouth daily. 30 tablet 0    docusate sodium 100 MG Oral Cap Take 100 mg by mouth 2 (two) times daily. 20 capsule 0    Acetaminophen 500 MG Oral Cap Take 1-2 capsules (500-1,000 mg total) by mouth every 6 (six) hours as needed for Pain. 30 capsule 0    lamoTRIgine 200 MG Oral Tab Take 1 tablet (200 mg total) by mouth daily as needed.      STIMULANT LAXATIVE 8.6-50 MG Oral Tab Take 1 tablet by mouth 2 (two) times daily.      LATUDA 60 MG Oral Tab Take 1 tablet (60 mg total) by mouth daily as needed.  2    ClonazePAM 2 MG Oral Tab Take 1 tablet 3 x a day      polyethylene glycol, PEG 3350-KCl-NaBcb-NaCl-NaSulf, 236 g Oral Recon Soln Take 4,000 mL by mouth As Directed. Take 2,000 mL the night before your  procedure and 2,000 mL the morning of your procedure. Take as directed by GI clinic. Okay to substitute for generic. 1 each 0      Past Medical History:    Anxiety state    Back pain    medical management of pain    Back problem    Cataract    Constipation    Depression    Diverticulosis    Gastritis    GERD (gastroesophageal reflux disease)    Headache    Hiatal hernia    History of Helicobacter pylori infection    Migraines    Osteoarthritis    Visual impairment    glasses      Social History:  Social History     Socioeconomic History    Marital status: Single   Tobacco Use    Smoking status: Former     Current packs/day: 0.00     Types: Cigarettes     Quit date: 2010     Years since quittin.4    Smokeless tobacco: Never   Vaping Use    Vaping status: Never Used   Substance and Sexual Activity    Alcohol use: No     Alcohol/week: 0.0 standard drinks of alcohol    Drug use: No   Other Topics Concern    Caffeine Concern Yes     Comment: coffee, 3 cups daily    Exercise No    Pt has a pacemaker No    Pt has a defibrillator No    Reaction to local anesthetic No        BP Readings from Last 6 Encounters:   24 124/76   05/10/24 112/72   24 110/70   23 93/65   10/18/23 122/78   23 107/74       Wt Readings from Last 6 Encounters:   24 163 lb (73.9 kg)   05/10/24 163 lb (73.9 kg)   24 158 lb 8 oz (71.9 kg)   11/10/23 151 lb (68.5 kg)   10/18/23 145 lb (65.8 kg)   23 140 lb 9.6 oz (63.8 kg)       REVIEW OF SYSTEMS:   GENERAL HEALTH: feels well with no complaints   SKIN: denies any unusual skin lesions or rashes  RESPIRATORY: denies shortness of breath with exertion  CARDIOVASCULAR: denies chest pain on exertion  GI: denies abdominal pain and denies heartburn, no hematochezia   NEURO: denies headaches    EXAM:   /76   Pulse 57   Ht 5' 4\" (1.626 m)   Wt 163 lb (73.9 kg)   SpO2 99%   BMI 27.98 kg/m²  Body mass index is 27.98 kg/m².    GENERAL: well developed, well  nourished, in no apparent distress   SKIN: no rashes, no suspicious lesions  HEENT: atraumatic, normocephalic, ears and throat are clear  NECK: supple, no adenopathy, no bruits  LUNGS: clear to auscultation  CARDIO: RRR without murmur  GI: good bowel sounds, no masses, HSM or tenderness  EXTREMITIES: no cyanosis, clubbing or edema      ASSESSMENT AND PLAN:   Inga Rizo is a 64 year old female presenting for a pre-operative physical.     1. Preop examination    - Patient is low risk for this low risk procedure and medically cleared for surgery   - Hold all Aspirin, NSAID's, vitamins, and supplements for one week prior to surgery   - Basic Metabolic Panel (8) [E]; Future  - EKG 12 Lead; Future  - CBC W Differential W Platelet [E]; Future    2. Senile cataract of right eye, unspecified age-related cataract type    - Plans right eye cataract extraction under general anesthesia as scheduled    3. Mixed bipolar I disorder (HCC)    - Stable overall, and encouraged her to take her medication regularly and follow-up with her psychiatrist as well     4. Lumbar spondylosis    - Stable, and will hold all NSAID's prior to surgery           Sayda Akers DO  05/29/24  10:27 AM

## 2024-06-04 ENCOUNTER — TELEPHONE (OUTPATIENT)
Facility: CLINIC | Age: 64
End: 2024-06-04

## 2024-06-04 NOTE — TELEPHONE ENCOUNTER
Last mammogram ordered in October, not completed     Last completed mammogram was 2017    Scheduling Instructions:  To schedule a test at any [facility] Facility, call Central Scheduling at [phone], Monday through Friday between 7:30am to 6pm and on Saturday between 8am and 1pm.                   [facility]                                                                                    [address]                      [place], [place]                          Spoke to patient, gave her scheduling number to schedule mammogram.

## 2024-06-04 NOTE — TELEPHONE ENCOUNTER
Patient is on the list from mammogram   to be completed .  At this time there is no order for mammogram .  Please assist with a order so we can call patient and assist scheduling .

## 2024-06-04 NOTE — TELEPHONE ENCOUNTER
Spoke to pt lab Orders placed Subjective   Patient ID: Yadi is a 68 year old female.    Chief Complaint   Patient presents with    UTI     Pt c/o lower abd pain x1 week with minimal discomfort with urination. Pt reports increased urge to urinate and feeling like she still has to go after she went. Pt reports no fever, or lower back pain.     C/o dysuria, urinary frequency and urgency x 1 week  2 days ago noticed blood on toilet paper  Has occasional diarrhea d/t Linzess    Denies: f,c, hematuria, flank pain, n,v,d,c, vaginal discharge    OTC: cranberry tablets    Last UTI: yrs ago              Yadi has a current medication list which includes the following prescription(s): levothyroxine, linaclotide, and nitrofurantoin (macrocrystal-monohydrate).    Yadi is allergic to latex and penicillins.    Review of Systems   Genitourinary:  Positive for dysuria, frequency and urgency.   All other systems reviewed and are negative.      Objective   Physical Exam  Vitals reviewed.   Constitutional:       General: She is not in acute distress.     Appearance: Normal appearance. She is normal weight. She is not ill-appearing or toxic-appearing.   HENT:      Head: Normocephalic.   Cardiovascular:      Rate and Rhythm: Normal rate and regular rhythm.      Pulses: Normal pulses.      Heart sounds: Normal heart sounds. No murmur heard.  Pulmonary:      Effort: Pulmonary effort is normal.      Breath sounds: Normal breath sounds. No wheezing or rhonchi.   Abdominal:      Palpations: Abdomen is soft.      Tenderness: There is abdominal tenderness in the suprapubic area. There is no right CVA tenderness or left CVA tenderness.   Musculoskeletal:         General: Normal range of motion.   Neurological:      General: No focal deficit present.      Mental Status: She is alert and oriented to person, place, and time.         Assessment   Problem List Items Addressed This Visit    None  Visit Diagnoses       Acute UTI    -  Primary    Dysuria        Relevant Orders     POCT Urine Dip Non-Auto (Completed)    Urine, Bacterial Culture            Patient Instructions   Poc urine   UC+S send out  Nitrofurantoin bid x 5 days  Increase oral fluid intake  Urinate when needed, avoid holding urine  Wipe front-to-back  Avoid sexual intercourse while symptoms persist  Urinate after sexual intercourse  May drink cranberry juice or take cranberry tablets  Complete the entire course of antibiotic  ER precautions discussed  F/u with PCP in 2-3 days

## 2024-06-12 ENCOUNTER — TELEPHONE (OUTPATIENT)
Facility: CLINIC | Age: 64
End: 2024-06-12

## 2024-06-12 NOTE — TELEPHONE ENCOUNTER
1st,overdue reminder letter mailed out to patient    Imaging order    XR ABDOMEN, OBSTRUCTIVE SERIES 3 VIEWS(CPT=74021) (Order #[id number]) on 5/10/24

## 2024-06-21 ENCOUNTER — TELEPHONE (OUTPATIENT)
Facility: CLINIC | Age: 64
End: 2024-06-21

## 2024-06-21 NOTE — TELEPHONE ENCOUNTER
I spoke to Cammy from [facility] regarding the obstructive series.   She stated as long as the insurance is the same on the day of the imaging, she does not need a prior authorization.    I spoke to Candice from radiology department, the patient may walk in during the office hours.    Called and spoke to the patient, date of birth and name verified.  She was informed of the above.    She was appreciative of the call.

## 2024-06-21 NOTE — TELEPHONE ENCOUNTER
Patient called to speak with a nurse regarding her abdominal scan. Patient was told she needs a PA form the provider sent over to her insurance.

## 2024-06-21 NOTE — TELEPHONE ENCOUNTER
I received a call from Willy patient Caregiver and he stated that patient insurance inform patient the MRI needs to be approved. He was inform that the status I was able to see from December 2023 is that it was denied. He was inform to call manage care for more information. He was given the number and he will give them a call.  He also want to now about the X ray of the abd. He was inform he will have to call ZAK Espinoza as she is the ordering provider about this test that was ordered on 5/10/2024. He will give her office a call.

## 2024-06-24 ENCOUNTER — HOSPITAL ENCOUNTER (OUTPATIENT)
Dept: GENERAL RADIOLOGY | Facility: HOSPITAL | Age: 64
Discharge: HOME | End: 2024-06-24

## 2024-06-24 ENCOUNTER — TELEPHONE (OUTPATIENT)
Facility: CLINIC | Age: 64
End: 2024-06-24

## 2024-06-24 DIAGNOSIS — K59.09 CHRONIC CONSTIPATION: Primary | ICD-10-CM

## 2024-06-24 DIAGNOSIS — K59.09 CHRONIC CONSTIPATION: ICD-10-CM

## 2024-06-24 DIAGNOSIS — K58.1 IRRITABLE BOWEL SYNDROME WITH CONSTIPATION: ICD-10-CM

## 2024-06-24 PROCEDURE — 74021 RADEX ABDOMEN 3+ VIEWS: CPT

## 2024-06-24 NOTE — TELEPHONE ENCOUNTER
Ronald Godwin    I spoke to the patient.    She completed obstructive series today, results posted in Epic.    She stated she is still uncomfortable, not able to move her bowels regularly.    She took linzess today and had 2 fleets enema without good results.    Please advise    Thank you

## 2024-06-24 NOTE — TELEPHONE ENCOUNTER
Patient called again and states she is still having pain and is calling for results.  Please call.

## 2024-06-24 NOTE — TELEPHONE ENCOUNTER
Called and spoke to the patient, date of birth and name verified.    Explained that I did not call her since results was not available earlier

## 2024-06-25 RX ORDER — POLYETHYLENE GLYCOL 3350 17 G/17G
17 POWDER, FOR SOLUTION ORAL DAILY
Qty: 255 G | Refills: 0 | Status: SHIPPED | OUTPATIENT
Start: 2024-06-25 | End: 2024-07-10

## 2024-06-25 NOTE — TELEPHONE ENCOUNTER
Called patient, moderate stool burden on xray. Will advise bowel wash out. Patient does not have mychart set up and is currently not home. I will call her later today for below instructions.     WASHOUT INSTRUCTIONS:  1. Pick a day you will be at home, close to a toilet.  2. Have some juice for breakfast.   3. Around 8AM start drinking the solution prescribed (for MIRALAX instructions, see below) over the course of 3-5 hours. IF having nausea/bloating, take a break for 30 minutes, walk around and then resume drinking. If vomiting, take a break for 1 hour, if symptoms improve, and you feel well, can resume drinking.  4. Goal is to finish solution or until stools turn liquid & yellow.  5. For lunch you should have a liquid diet (7up, water, gingerale, gatorade, etc)  6. After prep, for dinner you can have a light dinner.  7. Resume regular meals the following day, along with laxative medications.  8. You should continue your regular medications during the washout day.       Mix the ENTIRE bottle of MiraLAX 8.3-8.9oz bottle (either 238 or 255 grams) into 64 ounces of Gatorade or another clear liquid (sports drink, apple juice, lemonade). Shake until MiraLAX is dissolved.  Drink one 8 ounce glass of the MiraLAX solution every 30 minutes until the solution is gone.  If you become nauseated or feel full, stop drinking for at least 30 minutes. Then resume the MiraLAX solution using smaller amounts   It is important to continue to drink clear liquids even after you have finished the MiraLAX to continue to flush your colon and ensure that you are staying well hydrated.     ZAK Espinoza

## 2024-06-27 ENCOUNTER — TELEPHONE (OUTPATIENT)
Facility: CLINIC | Age: 64
End: 2024-06-27

## 2024-06-27 ENCOUNTER — HOSPITAL ENCOUNTER (OUTPATIENT)
Dept: MRI IMAGING | Age: 64
Discharge: HOME | End: 2024-06-27
Attending: FAMILY MEDICINE

## 2024-06-27 DIAGNOSIS — M48.56XA NONTRAUMATIC COMPRESSION FRACTURE OF L1 VERTEBRA, INITIAL ENCOUNTER (HCC): Primary | ICD-10-CM

## 2024-06-27 DIAGNOSIS — S32.010D COMPRESSION FRACTURE OF L1 VERTEBRA WITH ROUTINE HEALING, SUBSEQUENT ENCOUNTER: ICD-10-CM

## 2024-06-27 PROCEDURE — 72148 MRI LUMBAR SPINE W/O DYE: CPT | Performed by: FAMILY MEDICINE

## 2024-06-27 RX ORDER — TRAMADOL HYDROCHLORIDE 50 MG/1
50 TABLET ORAL EVERY 6 HOURS PRN
Qty: 30 TABLET | Refills: 0 | Status: SHIPPED | OUTPATIENT
Start: 2024-06-27

## 2024-06-27 NOTE — TELEPHONE ENCOUNTER
Please notify patient the MRI verified a compression deformity at L1, and she needs to see a neurosurgeon to discuss treatment options. I have referred her to Dr. Hurtado. She also has signs of arthritis in her spine. Let me know if her pain is not being managed with her current regimen as I can prescribed something temporarily pending neurosurgery visit.

## 2024-06-27 NOTE — TELEPHONE ENCOUNTER
Patient asking if  can call her with the MRI results she had today. Patient advised  has yet to review the results, but will. Patient states she would like results reviewed as soon as possible because she is in a lot of pain.

## 2024-06-27 NOTE — TELEPHONE ENCOUNTER
The imaging did not comment on her sacrum so I can not answer that question. I sent Tramadol 50 mg every 6 hours as needed for more severe pain, but to continue Tylenol or Meloxicam as needed prior to taking Tramadol.

## 2024-06-27 NOTE — TELEPHONE ENCOUNTER
Dr. Akers, patient  would like to know if fractured tailbone shows that it is healed or not on recent imaging.  Also requesting pain medication to pharmacy on-file. Thank you.    Patient contacted (name and  of patient verified). Dr. Akers's message reviewed. She put caregiver on phone to review referral information, verbalizes understanding. States patient would like pain medication. Would like to know if fractured tailbone healed, reports patient had to wear a brace for months.

## 2024-06-28 NOTE — TELEPHONE ENCOUNTER
Patient contacted and made aware of Dr. Akers's interpretation and recommendations, as well as Rx sent. Patient verbalized understanding. No further questions or concerns at this time.

## 2024-07-25 ENCOUNTER — TELEPHONE (OUTPATIENT)
Facility: CLINIC | Age: 64
End: 2024-07-25

## 2024-07-25 NOTE — TELEPHONE ENCOUNTER
Advised patient of Dr. Mueller's note and number given to gastroenterology. Patient verbalized understanding and will call them.

## 2024-07-25 NOTE — TELEPHONE ENCOUNTER
Covering for Dr Akers. Unfortunately after reviewing GI note from 5/10/2024 appears patient has been tried on various medications without relief. Recommend patient reach out to GI for further recommendations    Mounika Mueller MD, 07/25/24, 12:54 PM

## 2024-07-25 NOTE — TELEPHONE ENCOUNTER
Received call from Dmitri and patient joined call on speaker phone , verified patient's name and date of birth.  Inga is calling to let Dr Akers know that she feels Linzess is not very effective.  She is having one small bowel movement per day since starting it.  Patient declined appointment for office visit.    Dr Akers please advise.        Patient also asks if GI staff can give her more information on the time and location of colonoscopy scheduled  8/9/24  because she needs 3 days notice to arrange transportation.     [facility] Clinical Staff, Please assist.

## 2024-08-09 ENCOUNTER — ANESTHESIA EVENT (OUTPATIENT)
Dept: ENDOSCOPY | Age: 64
End: 2024-08-09
Payer: MEDICAID

## 2024-08-09 ENCOUNTER — HOSPITAL ENCOUNTER (OUTPATIENT)
Age: 64
Setting detail: HOSPITAL OUTPATIENT SURGERY
Discharge: HOME | End: 2024-08-09
Attending: INTERNAL MEDICINE | Admitting: INTERNAL MEDICINE
Payer: MEDICAID

## 2024-08-09 ENCOUNTER — ANESTHESIA (OUTPATIENT)
Dept: ENDOSCOPY | Age: 64
End: 2024-08-09
Payer: MEDICAID

## 2024-08-09 VITALS
WEIGHT: 165 LBS | DIASTOLIC BLOOD PRESSURE: 78 MMHG | HEIGHT: 63 IN | RESPIRATION RATE: 16 BRPM | SYSTOLIC BLOOD PRESSURE: 104 MMHG | BODY MASS INDEX: 29.23 KG/M2 | OXYGEN SATURATION: 99 % | HEART RATE: 48 BPM

## 2024-08-09 DIAGNOSIS — Z12.11 SCREENING FOR COLON CANCER: ICD-10-CM

## 2024-08-09 DIAGNOSIS — K59.00 CONSTIPATION, UNSPECIFIED CONSTIPATION TYPE: ICD-10-CM

## 2024-08-09 PROBLEM — Z12.12 ENCOUNTER FOR COLORECTAL CANCER SCREENING: Status: ACTIVE | Noted: 2024-08-09

## 2024-08-09 PROCEDURE — 45380 COLONOSCOPY AND BIOPSY: CPT | Performed by: INTERNAL MEDICINE

## 2024-08-09 PROCEDURE — 99070 SPECIAL SUPPLIES PHYS/QHP: CPT | Performed by: INTERNAL MEDICINE

## 2024-08-09 PROCEDURE — 88305 TISSUE EXAM BY PATHOLOGIST: CPT | Performed by: INTERNAL MEDICINE

## 2024-08-09 PROCEDURE — 45385 COLONOSCOPY W/LESION REMOVAL: CPT | Performed by: INTERNAL MEDICINE

## 2024-08-09 RX ORDER — NALOXONE HYDROCHLORIDE 0.4 MG/ML
80 INJECTION, SOLUTION INTRAMUSCULAR; INTRAVENOUS; SUBCUTANEOUS AS NEEDED
Status: DISCONTINUED | OUTPATIENT
Start: 2024-08-09 | End: 2024-08-09

## 2024-08-09 RX ORDER — SODIUM CHLORIDE, SODIUM LACTATE, POTASSIUM CHLORIDE, CALCIUM CHLORIDE 600; 310; 30; 20 MG/100ML; MG/100ML; MG/100ML; MG/100ML
INJECTION, SOLUTION INTRAVENOUS CONTINUOUS
Status: DISCONTINUED | OUTPATIENT
Start: 2024-08-09 | End: 2024-08-09

## 2024-08-09 RX ORDER — LIDOCAINE HYDROCHLORIDE 10 MG/ML
INJECTION, SOLUTION EPIDURAL; INFILTRATION; INTRACAUDAL; PERINEURAL AS NEEDED
Status: DISCONTINUED | OUTPATIENT
Start: 2024-08-09 | End: 2024-08-09 | Stop reason: SURG

## 2024-08-09 RX ADMIN — SODIUM CHLORIDE, SODIUM LACTATE, POTASSIUM CHLORIDE, CALCIUM CHLORIDE: 600; 310; 30; 20 INJECTION, SOLUTION INTRAVENOUS at 11:36:00

## 2024-08-09 RX ADMIN — LIDOCAINE HYDROCHLORIDE 30 MG: 10 INJECTION, SOLUTION EPIDURAL; INFILTRATION; INTRACAUDAL; PERINEURAL at 11:38:00

## 2024-08-09 NOTE — DISCHARGE INSTRUCTIONS
Home Care Instructions for Colonoscopy with Sedation    Diet:  - Resume your regular diet as tolerated unless otherwise instructed.  - Start with light meals to minimize bloating.  - Do not drink alcohol today.    Medication:  - If you have questions about resuming your normal medications, please contact your Primary Care Physician.    Activities:  - Take it easy today. Do not return to work today.  - Do not drive today.  - Do not operate any machinery today (including kitchen equipment).    Colonoscopy:  - You may notice some rectal \"spotting\" (a little blood on the toilet tissue) for a day or two after the exam. This is normal.  - If you experience any rectal bleeding (not spotting), persistent tenderness or sharp severe abdominal pains, oral temperature over 100 degrees Fahrenheit, light-headedness or dizziness, or any other problems, contact your doctor.    **If unable to reach your doctor, please go to the [facility] Emergency Room**    - Your referring physician will receive a full report of your examination.  - If you do not hear from your doctor's office within two weeks of your biopsy, please call them for your results.    You may be able to see your laboratory results in FIZZA between 4 and 7 business days.  In some cases, your physician may not have viewed the results before they are released to FIZZA.  If you have questions regarding your results contact the physician who ordered the test/exam by phone or via FIZZA by choosing \"Ask a Medical Question.\"

## 2024-08-09 NOTE — ANESTHESIA POSTPROCEDURE EVALUATION
Patient: Inga Rizo    Procedure Summary       Date: 08/09/24 Room / Location: [facility] ENDOSCOPY 01 / [facility] ENDO    Anesthesia Start: 1136 Anesthesia Stop: 1214    Procedure: COLONOSCOPY With Polypectomy Diagnosis:       Screening for colon cancer      Constipation, unspecified constipation type      (Polyps, Diverticulosis)    Surgeons: Matt Jerome MD Anesthesiologist: Ioana Dorman DO    Anesthesia Type: MAC ASA Status: 2            Anesthesia Type: MAC    Vitals Value Taken Time   BP 84/57 08/09/24 1214   Temp  08/09/24 1214   Pulse 62 08/09/24 1214   Resp 15 08/09/24 1214   SpO2 97 08/09/24 1214       [facility] AN Post Evaluation:   Patient Evaluated in PACU  Patient Participation: waiting for patient participation  Level of Consciousness: sleepy but conscious  Pain Score: 0  Pain Management: adequate  Airway Patency:patent  Dental exam unchanged from preop  Yes    Nausea/Vomiting: none  Cardiovascular Status: hemodynamically stable  Respiratory Status: spontaneous ventilation, unassisted, nonlabored ventilation and room air  Postoperative Hydration stable      Ioana Dorman DO  8/9/2024 12:14 PM

## 2024-08-09 NOTE — H&P
History & Physical Examination    Patient Name: Inga Rizo  MRN: [id number]  [facility]: [id number]  YOB: 1960    Diagnosis: colorectal cancer screening      Last colonoscopy noted in . Seen in the office with GI APRN in May 2024 for abdominal pain, constipation and bloating. She is here today for colorectal cancer screening.     Medications Prior to Admission   Medication Sig Dispense Refill Last Dose    traMADol 50 MG Oral Tab Take 1 tablet (50 mg total) by mouth every 6 (six) hours as needed for Pain (severe pain). 30 tablet 0 2024    [] linaCLOtide (LINZESS) 145 MCG Oral Cap Take 145 mcg by mouth daily. 90 capsule 0     polyethylene glycol, PEG 3350-KCl-NaBcb-NaCl-NaSulf, 236 g Oral Recon Soln Take 4,000 mL by mouth As Directed. Take 2,000 mL the night before your procedure and 2,000 mL the morning of your procedure. Take as directed by GI clinic. Okay to substitute for generic. 1 each 0 2024    Cyanocobalamin 1000 MCG Oral Cap Take 1 capsule by mouth daily. 90 capsule 1 2024    ferrous sulfate 325 (65 FE) MG Oral Tab EC Take 1 tablet (325 mg total) by mouth daily with breakfast. 20 tablet 0 2024    docusate sodium 100 MG Oral Cap Take 100 mg by mouth 2 (two) times daily. 20 capsule 0 2024    Acetaminophen 500 MG Oral Cap Take 1-2 capsules (500-1,000 mg total) by mouth every 6 (six) hours as needed for Pain. 30 capsule 0 2024    lamoTRIgine 200 MG Oral Tab Take 1 tablet (200 mg total) by mouth daily as needed.   2024    STIMULANT LAXATIVE 8.6-50 MG Oral Tab Take 1 tablet by mouth 2 (two) times daily.   2024    LATUDA 60 MG Oral Tab Take 1 tablet (60 mg total) by mouth daily as needed.  2 2024    ClonazePAM 2 MG Oral Tab Take 1 tablet 3 x a day   2024    [] polyethylene glycol, PEG 3350, 17 GM/SCOOP Oral Powder Take 17 g by mouth daily for 15 doses. Take as directed by GI provider. 255 g 0     [] cholecalciferol 125 MCG (5000 UT) Oral Cap  Take 1 capsule (5,000 Units total) by mouth daily. 90 capsule 3     methocarbamol 750 MG Oral Tab Take 1 tablet (750 mg total) by mouth 3 (three) times daily. (Patient not taking: Reported on 2024) 90 tablet 0 Not Taking    Meloxicam 15 MG Oral Tab Take 1 tablet (15 mg total) by mouth daily. (Patient not taking: Reported on 2024) 30 tablet 0 Not Taking     Current Facility-Administered Medications   Medication Dose Route Frequency    lactated ringers infusion   Intravenous Continuous       Allergies: No Known Allergies    Past Medical History:    Anxiety state    Back pain    medical management of pain    Back problem    Bipolar affective (HCC)    Cataract    Constipation    Depression    Diverticulosis    Gastritis    GERD (gastroesophageal reflux disease)    Headache    Hiatal hernia    History of Helicobacter pylori infection    Migraines    Osteoarthritis    Visual impairment    glasses     Past Surgical History:   Procedure Laterality Date    Appendectomy      Bone replacement graft            x2    Colonoscopy      D & c  2000    Egd  2012    Hysterectomy      TVH    Hysteroscopy      Michelle localization wire 1 site right (cpt=19281)      Other surgical history      cancer mole removed    Other surgical history      removed ovarires    Total knee replacement Left 11/10/2023     Family History   Problem Relation Age of Onset    Cancer Father 35        lung ca    Breast Cancer Maternal Grandmother 50        d.55    Cancer Maternal Grandfather 68        colon ca    Cancer Mother 59        non-Hodgkins lymphoma (cause of death)    Colon Cancer Maternal Uncle 59        (cause of death)    Cancer Maternal Uncle 60        Hodgkins lymphoma    Cancer Paternal Aunt         liver (cause of death)    Breast Cancer Paternal Grandmother 71    Cancer Paternal Grandfather 68        colon ca    Ovarian Cancer Paternal Aunt     Breast Cancer Paternal Aunt     Breast Cancer Paternal Cousin Female  41        d.50    Ovarian Cancer Paternal Cousin Female 38        d.45    Cancer Paternal Uncle         colon ca    Cancer Paternal Uncle         brain ca    Cancer Brother 61        Hodgkins    Diabetes Neg     Glaucoma Neg     Macular degeneration Neg      Social History     Tobacco Use    Smoking status: Former     Current packs/day: 0.00     Types: Cigarettes     Quit date: 2010     Years since quittin.6    Smokeless tobacco: Never   Substance Use Topics    Alcohol use: No     Alcohol/week: 0.0 standard drinks of alcohol       SYSTEM Check if Physical Exam is Normal If not normal, please explain:   HEENT [ X]    NECK  [ X]    HEART [ X]    LUNGS [ X]    ABDOMEN [ X]    EXTREMITIES [ X]      General:awake, cooperative, no acute distress  HEENT: EOMI, no scleral icterus, MMM; oral pharnyx is without exudates or lesions  Neck: no lymphadenopathy; thyroid is not enlarged and without nodules  CV: RRR  Resp: non-labored breathing  Abd: soft, non-tender, non-distended  Ext: no lower extremity swelling  Neuro: Alert, Oriented X 3  Skin: no rashes, bruises  Psych: normal affect  I have discussed the risks and benefits and alternatives of the procedure with the patient/family.  She understands and agreed to proceed with plan of care.   Matt Jerome MD  [facility] - Gastroenterology  2024  11:31 AM

## 2024-08-09 NOTE — ANESTHESIA PREPROCEDURE EVALUATION
Anesthesia PreOp Note    HPI:     Inga Rizo is a 64 year old female who presents for preoperative consultation requested by: Matt Jerome MD    Date of Surgery: 8/9/2024    Procedure(s):  COLONOSCOPY  Indication: Screening for colon cancer   Constipation, unspecified constipation type    Relevant Problems   No relevant active problems       NPO:  Last Liquid Consumption Date: 08/09/24  Last Liquid Consumption Time: 0700  Last Solid Consumption Date: 08/08/24  Last Solid Consumption Time: 1000  Last Liquid Consumption Date: 08/09/24          History Review:  Patient Active Problem List    Diagnosis Date Noted    TB lung, latent 02/20/2024    Other fatigue 02/20/2024    Orthopedic aftercare 12/11/2023    Primary osteoarthritis of left knee 11/10/2023    Bipolar affective disorder (HCC) 11/10/2023    Non-traumatic compression fracture of first lumbar vertebra (HCC) 10/26/2023    Compression fracture of T9 vertebra (HCC) 10/26/2023    Chronic dental infection 03/07/2022    Prolonged Q-T interval on ECG 12/09/2020    Deviated septum 01/11/2017    Snoring 01/11/2017    Migraine without status migrainosus, not intractable 01/11/2017    Osteoarthritis of lumbar spine 01/11/2017    Lumbar spondylosis 12/20/2016    Meibomian gland dysfunction (MGD), bilateral, both upper and lower lids 04/20/2016    Age-related nuclear cataract of both eyes 04/20/2016    Hyperopia of both eyes with astigmatism and presbyopia 04/20/2016    Family history of malignant neoplasm of ovary 10/08/2015    Family history of malignant neoplasm of breast 10/08/2015    Diverticulosis of colon without hemorrhage 10/06/2015    History of Helicobacter pylori infection 10/06/2015    Hiatal hernia 10/06/2015    Hx of gastroesophageal reflux (GERD) 10/06/2015    Mixed bipolar I disorder (HCC) 09/03/2008       Past Medical History:    Anxiety state    Back pain    medical management of pain    Back problem    Bipolar affective (HCC)    Cataract     Constipation    Depression    Diverticulosis    Gastritis    GERD (gastroesophageal reflux disease)    Headache    Hiatal hernia    History of Helicobacter pylori infection    Migraines    Osteoarthritis    Visual impairment    glasses       Past Surgical History:   Procedure Laterality Date    Appendectomy      Bone replacement graft            x2    Colonoscopy  2012    D & c  2000    Egd      Hysterectomy      TVH    Hysteroscopy      Michelle localization wire 1 site right (cpt=19281)      Other surgical history      cancer mole removed    Other surgical history      removed ovarires    Total knee replacement Left 11/10/2023       Medications Prior to Admission   Medication Sig Dispense Refill Last Dose    traMADol 50 MG Oral Tab Take 1 tablet (50 mg total) by mouth every 6 (six) hours as needed for Pain (severe pain). 30 tablet 0 2024    [] linaCLOtide (LINZESS) 145 MCG Oral Cap Take 145 mcg by mouth daily. 90 capsule 0     polyethylene glycol, PEG 3350-KCl-NaBcb-NaCl-NaSulf, 236 g Oral Recon Soln Take 4,000 mL by mouth As Directed. Take 2,000 mL the night before your procedure and 2,000 mL the morning of your procedure. Take as directed by GI clinic. Okay to substitute for generic. 1 each 0 2024    Cyanocobalamin 1000 MCG Oral Cap Take 1 capsule by mouth daily. 90 capsule 1 2024    ferrous sulfate 325 (65 FE) MG Oral Tab EC Take 1 tablet (325 mg total) by mouth daily with breakfast. 20 tablet 0 2024    docusate sodium 100 MG Oral Cap Take 100 mg by mouth 2 (two) times daily. 20 capsule 0 2024    Acetaminophen 500 MG Oral Cap Take 1-2 capsules (500-1,000 mg total) by mouth every 6 (six) hours as needed for Pain. 30 capsule 0 2024    lamoTRIgine 200 MG Oral Tab Take 1 tablet (200 mg total) by mouth daily as needed.   2024    STIMULANT LAXATIVE 8.6-50 MG Oral Tab Take 1 tablet by mouth 2 (two) times daily.   2024    LATUDA 60 MG Oral Tab Take 1 tablet (60  mg total) by mouth daily as needed.  2 2024    ClonazePAM 2 MG Oral Tab Take 1 tablet 3 x a day   2024    [] polyethylene glycol, PEG 3350, 17 GM/SCOOP Oral Powder Take 17 g by mouth daily for 15 doses. Take as directed by GI provider. 255 g 0     [] cholecalciferol 125 MCG (5000 UT) Oral Cap Take 1 capsule (5,000 Units total) by mouth daily. 90 capsule 3     methocarbamol 750 MG Oral Tab Take 1 tablet (750 mg total) by mouth 3 (three) times daily. (Patient not taking: Reported on 2024) 90 tablet 0 Not Taking    Meloxicam 15 MG Oral Tab Take 1 tablet (15 mg total) by mouth daily. (Patient not taking: Reported on 2024) 30 tablet 0 Not Taking     Current Facility-Administered Medications Ordered in Epic   Medication Dose Route Frequency Provider Last Rate Last Admin    lactated ringers infusion   Intravenous Continuous Matt Jerome MD         No current [facility]-ordered outpatient medications on file.       No Known Allergies    Family History   Problem Relation Age of Onset    Cancer Father 35        lung ca    Breast Cancer Maternal Grandmother 50        d.55    Cancer Maternal Grandfather 68        colon ca    Cancer Mother 59        non-Hodgkins lymphoma (cause of death)    Colon Cancer Maternal Uncle 59        (cause of death)    Cancer Maternal Uncle 60        Hodgkins lymphoma    Cancer Paternal Aunt         liver (cause of death)    Breast Cancer Paternal Grandmother 71    Cancer Paternal Grandfather 68        colon ca    Ovarian Cancer Paternal Aunt     Breast Cancer Paternal Aunt     Breast Cancer Paternal Cousin Female 41        d.50    Ovarian Cancer Paternal Cousin Female 38        d.45    Cancer Paternal Uncle         colon ca    Cancer Paternal Uncle         brain ca    Cancer Brother 61        Hodgkins    Diabetes Neg     Glaucoma Neg     Macular degeneration Neg      Social History     Socioeconomic History    Marital status: Single   Tobacco Use    Smoking status:  Former     Current packs/day: 0.00     Types: Cigarettes     Quit date: 2010     Years since quittin.6    Smokeless tobacco: Never   Vaping Use    Vaping status: Never Used   Substance and Sexual Activity    Alcohol use: No     Alcohol/week: 0.0 standard drinks of alcohol    Drug use: No   Other Topics Concern    Caffeine Concern Yes     Comment: coffee, 3 cups daily    Exercise No    Pt has a pacemaker No    Pt has a defibrillator No    Reaction to local anesthetic No       Available pre-op labs reviewed.  Lab Results   Component Value Date    WBC 7.0 2024    RBC 4.54 2024    HGB 13.4 2024    HCT 42.0 2024    MCV 92.5 2024    MCH 29.5 2024    MCHC 31.9 2024    RDW 12.9 2024    .0 2024     Lab Results   Component Value Date     2024    K 4.2 2024     2024    CO2 27.0 2024    BUN 17 2024    CREATSERUM 0.83 2024    GLU 90 2024    CA 9.3 2024          Vital Signs:  Body mass index is 29.23 kg/m².   height is 1.6 m (5' 3\") and weight is 74.8 kg (165 lb). Her blood pressure is 102/70 and her pulse is 58. Her respiration is 14 and oxygen saturation is 97%.   Vitals:    24 1011 24 1124   BP:  102/70   Pulse:  58   Resp:  14   SpO2:  97%   Weight: 74.8 kg (165 lb)    Height: 1.6 m (5' 3\")         Anesthesia Evaluation      No history of anesthetic complications   Airway   Mallampati: I  TM distance: >3 FB  Neck ROM: full  Dental - Dentition appears grossly intact     Pulmonary - negative ROS and normal exam   (-) asthma, shortness of breath, recent URI  Cardiovascular   Exercise tolerance: good  (-) dysrhythmias, angina, ALEGRIA    Rhythm: regular  Rate: normal    Neuro/Psych    (+)  neuromuscular disease, anxiety/panic attacks,  bipolar disorder, depression      GI/Hepatic/Renal    (+) GERD, bowel prep  (-) liver disease, renal disease    Endo/Other - negative ROS   (+) arthritis  (-)  diabetes mellitus, blood dyscrasia  Abdominal  - normal exam                 Anesthesia Plan:   ASA:  2  Plan:   MAC  Informed Consent Plan and Risks Discussed With:  Patient  Discussed plan with:  Surgeon      I have informed Inga Lovbree and/or legal guardian or family member of the nature of the anesthetic plan, benefits, risks including possible dental damage if relevant, major complications, and any alternative forms of anesthetic management.   All of the patient's questions were answered to the best of my ability. The patient desires the anesthetic management as planned.  Ioana Dorman DO  8/9/2024 11:31 AM  Present on Admission:  **None**

## 2024-08-09 NOTE — OPERATIVE REPORT
Colonoscopy Report    Inga Rizo     1960 Age 64 year old   PCP Sayda Akers DO Endoscopist Matt Jerome MD     Date of procedure: 24    Procedure: Colonoscopy w/ biopsy and snare polypectomy    Pre-operative diagnosis: colorectal cancer screening    Post-operative diagnosis: colon polyps, rectal polyps, diverticulosis, hemorrhoids    Sedation: monitored anesthesia care (MAC)    Consent: We discussed the risks/benefits and alternatives to this procedure, as well as the planned sedation. Informed consent was obtained from the patient after the risks of the procedure were discussed, including but not limited to bleeding, perforation, aspiration, infection, or possibility of a missed lesion as well as the risks of anesthesia including but not limited to cardiopulmonary complications. The patient signed informed consent and elected to proceed with Colonoscopy with intervention [i.e. Biopsy, control of bleeding, dilatation, polypectomy, endoscopic mucosal resection, etc.] as indicated.    Colonoscopy procedure: Once an adequate level of sedation was obtained a digital rectal exam was completed revealing normal tone and no masses palpated. Then the lubricated tip of the [id number] diagnostic video pediatric colonoscope was carefully inserted and advanced without difficulty to the cecum using the air insufflation technique (only Co2 was used for insufflation). The cecum was identified by localizing the trifold, the appendix and the ileocecal valve. Withdrawal was begun with thorough washing and careful examination of the colonic walls and folds. The ascending colon was viewed twice in the forward view. Photodocumentation was obtained. The bowel prep was good. Views of the colon were good with washing. Withdrawal time was 18 minutes.    Air was then withdrawn and the endoscope was removed. The patient tolerated the procedure well. There were no immediate postoperative complications. The patient’s  vital signs were monitored throughout the procedure and remained stable.    Estimated blood loss: insignificant    Specimens collected:  colon and rectal polyps    Complications: none     Colonoscopy findings:    Cecum: normal mucosa and vascular pattern    Ascending colon: there was a 2-3 mm polyp removed by cold biopsy forceps. Otherwise, normal mucosa and vascular pattern    Transverse colon: there was a 2-3 mm polyp removed by cold biopsy forceps. Otherwise, normal mucosa and vascular pattern    Descending colon: normal mucosa and vascular pattern    Sigmoid colon: there was a 3 mm polyp removed by cold biopsy forceps. There was mild diverticulosis. Otherwise, normal mucosa and vascular pattern    Rectum: retroflexed view showed small non-bleeding hemorrhoids. There were two diminutive polyps adjacent to each other which were both removed en bloc by cold snare polypectomy. Otherwise, normal mucosa and vascular pattern.    Impression:  1. 5 diminutive polyps removed  2. Mild sigmoid colon diverticulosis  3. Small hemorrhoids  4. Otherwise, unremarkable colonoscopy    Recommend:  1. Await pathology.   2. Repeat colonoscopy interval pending final pathology results. If new signs or symptoms develop, procedure may need to be repeated sooner.   3. Continue your current medications  4. Increase fiber in the diet  5. Follow up with your primary care physician and in GI clinic on a routine basis    >>>If biopsies were performed and you have not received your pathology results either by phone or letter within 2 weeks, please call our office at [phone].    MD Vick [facility] - Gastroenterology  8/9/2024

## 2024-08-13 ENCOUNTER — TELEPHONE (OUTPATIENT)
Facility: CLINIC | Age: 64
End: 2024-08-13

## 2024-08-13 NOTE — TELEPHONE ENCOUNTER
Matt Jerome MD  P [facility] Gi Clinical Staff  GI staff: please place recall for colonoscopy in 5 years  Colonoscopy done on 08/09/2024   Letter mailed out to patient on  8/13/2024  [facility] Maintenance Updated and Patient Outreach was placed for Colon Recall

## 2024-09-04 ENCOUNTER — TELEPHONE (OUTPATIENT)
Facility: CLINIC | Age: 64
End: 2024-09-04

## 2024-09-06 NOTE — TELEPHONE ENCOUNTER
Please notify patient of approval. Prior authorization for clonazepam has been approved.    Approved    Prior authorization approved  Payer: [facility] Case ID: [id number]    [phone]    [phone]  Note from payer: The case has been Approved from  [id number]  Approval Details    Authorized from June 7, 2024 to September 5, 2025  Electronic appeal: Not supported

## 2024-09-06 NOTE — TELEPHONE ENCOUNTER
Message left for patient to call us back to notify medicine is covered, could not leave detailed message on her voicemail.

## 2024-09-12 DIAGNOSIS — K58.1 IRRITABLE BOWEL SYNDROME WITH CONSTIPATION: ICD-10-CM

## 2024-09-12 DIAGNOSIS — K59.09 CHRONIC CONSTIPATION: ICD-10-CM

## 2024-09-13 ENCOUNTER — LAB ENCOUNTER (OUTPATIENT)
Dept: LAB | Facility: REFERENCE LAB | Age: 64
End: 2024-09-13
Attending: FAMILY MEDICINE
Payer: MEDICAID

## 2024-09-13 ENCOUNTER — OFFICE VISIT (OUTPATIENT)
Facility: CLINIC | Age: 64
End: 2024-09-13
Payer: MEDICAID

## 2024-09-13 VITALS
TEMPERATURE: 98 F | HEART RATE: 65 BPM | SYSTOLIC BLOOD PRESSURE: 116 MMHG | DIASTOLIC BLOOD PRESSURE: 70 MMHG | HEIGHT: 63 IN | WEIGHT: 176.38 LBS | BODY MASS INDEX: 31.25 KG/M2 | OXYGEN SATURATION: 97 % | RESPIRATION RATE: 18 BRPM

## 2024-09-13 DIAGNOSIS — R79.89 LOW VITAMIN B12 LEVEL: ICD-10-CM

## 2024-09-13 DIAGNOSIS — E66.9 CLASS 1 OBESITY WITH SERIOUS COMORBIDITY AND BODY MASS INDEX (BMI) OF 31.0 TO 31.9 IN ADULT, UNSPECIFIED OBESITY TYPE: ICD-10-CM

## 2024-09-13 DIAGNOSIS — H25.13 AGE-RELATED NUCLEAR CATARACT OF BOTH EYES: ICD-10-CM

## 2024-09-13 DIAGNOSIS — Z00.01 ENCOUNTER FOR GENERAL ADULT MEDICAL EXAMINATION WITH ABNORMAL FINDINGS: ICD-10-CM

## 2024-09-13 DIAGNOSIS — H02.88A MEIBOMIAN GLAND DYSFUNCTION (MGD), BILATERAL, BOTH UPPER AND LOWER LIDS: ICD-10-CM

## 2024-09-13 DIAGNOSIS — R63.5 WEIGHT GAIN: ICD-10-CM

## 2024-09-13 DIAGNOSIS — Z22.7 TB LUNG, LATENT: ICD-10-CM

## 2024-09-13 DIAGNOSIS — H52.203 HYPEROPIA OF BOTH EYES WITH ASTIGMATISM AND PRESBYOPIA: ICD-10-CM

## 2024-09-13 DIAGNOSIS — E55.9 VITAMIN D DEFICIENCY: ICD-10-CM

## 2024-09-13 DIAGNOSIS — H66.006 RECURRENT ACUTE SUPPURATIVE OTITIS MEDIA WITHOUT SPONTANEOUS RUPTURE OF TYMPANIC MEMBRANE OF BOTH SIDES: ICD-10-CM

## 2024-09-13 DIAGNOSIS — F31.60 MIXED BIPOLAR I DISORDER (HCC): ICD-10-CM

## 2024-09-13 DIAGNOSIS — H52.03 HYPEROPIA OF BOTH EYES WITH ASTIGMATISM AND PRESBYOPIA: ICD-10-CM

## 2024-09-13 DIAGNOSIS — G43.909 MIGRAINE WITHOUT STATUS MIGRAINOSUS, NOT INTRACTABLE, UNSPECIFIED MIGRAINE TYPE: ICD-10-CM

## 2024-09-13 DIAGNOSIS — K44.9 HIATAL HERNIA: ICD-10-CM

## 2024-09-13 DIAGNOSIS — E78.2 MIXED HYPERLIPIDEMIA: ICD-10-CM

## 2024-09-13 DIAGNOSIS — H02.88B MEIBOMIAN GLAND DYSFUNCTION (MGD), BILATERAL, BOTH UPPER AND LOWER LIDS: ICD-10-CM

## 2024-09-13 DIAGNOSIS — K57.30 DIVERTICULOSIS OF COLON WITHOUT HEMORRHAGE: ICD-10-CM

## 2024-09-13 DIAGNOSIS — M48.56XS NONTRAUMATIC COMPRESSION FRACTURE OF L1 VERTEBRA, SEQUELA: ICD-10-CM

## 2024-09-13 DIAGNOSIS — M17.12 PRIMARY OSTEOARTHRITIS OF LEFT KNEE: ICD-10-CM

## 2024-09-13 DIAGNOSIS — S22.070S COMPRESSION FRACTURE OF T9 VERTEBRA, SEQUELA: ICD-10-CM

## 2024-09-13 DIAGNOSIS — Z12.31 BREAST CANCER SCREENING BY MAMMOGRAM: ICD-10-CM

## 2024-09-13 DIAGNOSIS — M47.816 LUMBAR SPONDYLOSIS: ICD-10-CM

## 2024-09-13 DIAGNOSIS — M47.26 OSTEOARTHRITIS OF SPINE WITH RADICULOPATHY, LUMBAR REGION: ICD-10-CM

## 2024-09-13 DIAGNOSIS — J34.2 DEVIATED SEPTUM: ICD-10-CM

## 2024-09-13 DIAGNOSIS — E55.9 VITAMIN D DEFICIENCY: Primary | ICD-10-CM

## 2024-09-13 DIAGNOSIS — Z28.20 IMMUNIZATION NOT CARRIED OUT BECAUSE OF PATIENT DECISION: ICD-10-CM

## 2024-09-13 DIAGNOSIS — H52.4 HYPEROPIA OF BOTH EYES WITH ASTIGMATISM AND PRESBYOPIA: ICD-10-CM

## 2024-09-13 DIAGNOSIS — Z80.3 FAMILY HISTORY OF MALIGNANT NEOPLASM OF BREAST: ICD-10-CM

## 2024-09-13 DIAGNOSIS — Z00.01 ENCOUNTER FOR GENERAL ADULT MEDICAL EXAMINATION WITH ABNORMAL FINDINGS: Primary | ICD-10-CM

## 2024-09-13 PROBLEM — F31.9 BIPOLAR AFFECTIVE DISORDER (HCC): Status: RESOLVED | Noted: 2023-11-10 | Resolved: 2024-09-13

## 2024-09-13 PROBLEM — R06.83 SNORING: Status: RESOLVED | Noted: 2017-01-11 | Resolved: 2024-09-13

## 2024-09-13 PROBLEM — R53.83 OTHER FATIGUE: Status: RESOLVED | Noted: 2024-02-20 | Resolved: 2024-09-13

## 2024-09-13 PROBLEM — Z47.89 ORTHOPEDIC AFTERCARE: Status: RESOLVED | Noted: 2023-12-11 | Resolved: 2024-09-13

## 2024-09-13 PROBLEM — Z12.12 ENCOUNTER FOR COLORECTAL CANCER SCREENING: Status: RESOLVED | Noted: 2024-08-09 | Resolved: 2024-09-13

## 2024-09-13 PROBLEM — Z12.11 ENCOUNTER FOR COLORECTAL CANCER SCREENING: Status: RESOLVED | Noted: 2024-08-09 | Resolved: 2024-09-13

## 2024-09-13 PROBLEM — K04.7 CHRONIC DENTAL INFECTION: Status: RESOLVED | Noted: 2022-03-07 | Resolved: 2024-09-13

## 2024-09-13 LAB
CHOLEST SERPL-MCNC: 213 MG/DL (ref ?–200)
FASTING PATIENT LIPID ANSWER: NO
HDLC SERPL-MCNC: 53 MG/DL (ref 40–59)
LDLC SERPL CALC-MCNC: 146 MG/DL (ref ?–100)
NONHDLC SERPL-MCNC: 160 MG/DL (ref ?–130)
TRIGL SERPL-MCNC: 79 MG/DL (ref 30–149)
TSI SER-ACNC: 2.02 MIU/ML (ref 0.55–4.78)
VIT B12 SERPL-MCNC: 293 PG/ML (ref 211–911)
VIT D+METAB SERPL-MCNC: 23 NG/ML (ref 30–100)
VLDLC SERPL CALC-MCNC: 15 MG/DL (ref 0–30)

## 2024-09-13 PROCEDURE — 36415 COLL VENOUS BLD VENIPUNCTURE: CPT

## 2024-09-13 PROCEDURE — 82306 VITAMIN D 25 HYDROXY: CPT

## 2024-09-13 PROCEDURE — 99396 PREV VISIT EST AGE 40-64: CPT | Performed by: FAMILY MEDICINE

## 2024-09-13 PROCEDURE — 99214 OFFICE O/P EST MOD 30 MIN: CPT | Performed by: FAMILY MEDICINE

## 2024-09-13 PROCEDURE — 82607 VITAMIN B-12: CPT

## 2024-09-13 PROCEDURE — 84443 ASSAY THYROID STIM HORMONE: CPT

## 2024-09-13 PROCEDURE — 80061 LIPID PANEL: CPT

## 2024-09-13 RX ORDER — ERGOCALCIFEROL 1.25 MG/1
50000 CAPSULE, LIQUID FILLED ORAL WEEKLY
Qty: 4 CAPSULE | Refills: 2 | Status: SHIPPED | OUTPATIENT
Start: 2024-09-13

## 2024-09-13 RX ORDER — LINACLOTIDE 145 UG/1
1 CAPSULE, GELATIN COATED ORAL DAILY
Qty: 90 CAPSULE | Refills: 0 | Status: SHIPPED | OUTPATIENT
Start: 2024-09-13

## 2024-09-13 RX ORDER — SUMATRIPTAN 25 MG/1
TABLET, FILM COATED ORAL
Qty: 9 TABLET | Refills: 0 | Status: SHIPPED | OUTPATIENT
Start: 2024-09-13

## 2024-09-13 RX ORDER — TIRZEPATIDE 2.5 MG/.5ML
2.5 INJECTION, SOLUTION SUBCUTANEOUS WEEKLY
Qty: 2 ML | Refills: 0 | Status: SHIPPED | OUTPATIENT
Start: 2024-09-13 | End: 2024-10-05

## 2024-09-13 NOTE — PROGRESS NOTES
Subjective:   Inga Rizo is a 64 year old female who presents for Physical (Annual physical- no pap, would like to discuss weight gain )     Patient is noting ear pain. Started yesterday. Has history of ear infections like once a every 1.5 years to 2 years    Weight gain and back pain and knee pain  Patient has gained about 20 lbs over the course of the year. This is impacting her back pain. Also making leg pain worse. No change in diet. Patient remains active. Walks dogs daily. No personal or family history of thyroid cancer or MEN type 2. Has been keeping active and watching diet for well over 6 months and continues to gain weight.     Bipolar  Is going to see psychiatrist at [facility] at the end of September.    Migraine  Patient gets migraines every other day. IS on lamictal for bipolar disorder but does not find helpful for headaches.     Hiatal hernia  Patient doing well. No symptoms of acid reflux    Latent TB  Doing well. Status post treatment. No new coughs    Family history of breast cancer  Patient is working on getting mammogram scheduled    Deviated septum  Doing well. No issues with congestion    Ophthalmologic health  Following with optho. Doing well    History/Other:    Chief Complaint Reviewed and Verified  Nursing Notes Reviewed and   Verified  Tobacco Reviewed  Allergies Reviewed  Medications Reviewed    Problem List Reviewed  Medical History Reviewed  Surgical History   Reviewed  OB Status Reviewed  Family History Reviewed  Social History   Reviewed         Tobacco:  She smoked tobacco in the past but quit greater than 12 months ago.  Social History     Tobacco Use   Smoking Status Former    Current packs/day: 0.00    Types: Cigarettes    Quit date: 2010    Years since quittin.7   Smokeless Tobacco Never        Current Outpatient Medications   Medication Sig Dispense Refill    amoxicillin clavulanate 875-125 MG Oral Tab Take 1 tablet by mouth 2 (two) times daily for 10 days.  20 tablet 0    Tirzepatide-Weight Management (ZEPBOUND) 2.5 MG/0.5ML Subcutaneous Solution Auto-injector Inject 2.5 mg into the skin once a week for 4 doses. 2 mL 0    Cyanocobalamin 1000 MCG Oral Cap Take 1 capsule by mouth daily. 90 capsule 1    SUMAtriptan 25 MG Oral Tab Take one tablet by mouth at onset of migraine. May take another tablet 2 hours later if needed. 9 tablet 0    clonazePAM 2 MG Oral Tab Take 1 tablet (2 mg total) by mouth in the morning, at noon, and at bedtime. Take 1 tablet 3 x a day 270 tablet 0    traMADol 50 MG Oral Tab Take 1 tablet (50 mg total) by mouth every 6 (six) hours as needed for Pain (severe pain). 30 tablet 0    polyethylene glycol, PEG 3350-KCl-NaBcb-NaCl-NaSulf, 236 g Oral Recon Soln Take 4,000 mL by mouth As Directed. Take 2,000 mL the night before your procedure and 2,000 mL the morning of your procedure. Take as directed by GI clinic. Okay to substitute for generic. 1 each 0    ferrous sulfate 325 (65 FE) MG Oral Tab EC Take 1 tablet (325 mg total) by mouth daily with breakfast. 20 tablet 0    docusate sodium 100 MG Oral Cap Take 100 mg by mouth 2 (two) times daily. 20 capsule 0    Acetaminophen 500 MG Oral Cap Take 1-2 capsules (500-1,000 mg total) by mouth every 6 (six) hours as needed for Pain. 30 capsule 0    lamoTRIgine 200 MG Oral Tab Take 1 tablet (200 mg total) by mouth daily as needed.      STIMULANT LAXATIVE 8.6-50 MG Oral Tab Take 1 tablet by mouth 2 (two) times daily.      LATUDA 60 MG Oral Tab Take 1 tablet (60 mg total) by mouth daily as needed.  2         Review of Systems:  Review of Systems   Constitutional:  Positive for unexpected weight change (weight gain).   HENT:  Positive for ear pain (bilateral).    Eyes: Negative.    Respiratory: Negative.     Cardiovascular: Negative.    Gastrointestinal: Negative.    Genitourinary: Negative.    Musculoskeletal: Negative.    Skin: Negative.    Neurological: Negative.    Psychiatric/Behavioral: Negative.            Objective:   /70 (BP Location: Right arm, Patient Position: Sitting, Cuff Size: adult)   Pulse 65   Temp 98.2 °F (36.8 °C) (Oral)   Resp 18   Ht 5' 3\" (1.6 m)   Wt 176 lb 6.4 oz (80 kg)   SpO2 97%   BMI 31.25 kg/m²  Estimated body mass index is 31.25 kg/m² as calculated from the following:    Height as of this encounter: 5' 3\" (1.6 m).    Weight as of this encounter: 176 lb 6.4 oz (80 kg).    Wt Readings from Last 6 Encounters:   09/13/24 176 lb 6.4 oz (80 kg)   08/06/24 165 lb (74.8 kg)   05/29/24 163 lb (73.9 kg)   05/10/24 163 lb (73.9 kg)   02/20/24 158 lb 8 oz (71.9 kg)   11/10/23 151 lb (68.5 kg)       Physical Exam  Vitals and nursing note reviewed.   Constitutional:       General: She is not in acute distress.     Appearance: Normal appearance. She is not ill-appearing.   HENT:      Head: Normocephalic and atraumatic.      Right Ear: There is no impacted cerumen.      Left Ear: There is no impacted cerumen.      Ears:      Comments: Bilateral tympanic erythema     Mouth/Throat:      Mouth: Mucous membranes are moist.      Pharynx: Oropharynx is clear. No oropharyngeal exudate or posterior oropharyngeal erythema.   Eyes:      General:         Right eye: No discharge.         Left eye: No discharge.      Extraocular Movements: Extraocular movements intact.      Pupils: Pupils are equal, round, and reactive to light.   Cardiovascular:      Rate and Rhythm: Normal rate and regular rhythm.      Heart sounds: Normal heart sounds. No murmur heard.     No friction rub. No gallop.   Pulmonary:      Effort: Pulmonary effort is normal.      Breath sounds: Normal breath sounds. No wheezing, rhonchi or rales.   Abdominal:      General: Abdomen is flat. Bowel sounds are normal. There is no distension.      Palpations: Abdomen is soft. There is no mass.      Tenderness: There is no abdominal tenderness. There is no guarding or rebound.   Musculoskeletal:         General: Normal range of motion.       Cervical back: Normal range of motion.      Right lower leg: No edema.      Left lower leg: No edema.   Skin:     General: Skin is warm and dry.      Findings: No rash.   Neurological:      General: No focal deficit present.      Mental Status: She is alert. Mental status is at baseline.   Psychiatric:         Mood and Affect: Mood normal.         Behavior: Behavior normal.         Assessment & Plan:   1. Encounter for general adult medical examination with abnormal findings (Primary)  -     Lipid Panel; Future; Expected date: 09/13/2024    -reviewed previously normal labs including CBC and BMP  -last lipid in 2023. Ordered updated level    2. Recurrent acute suppurative otitis media without spontaneous rupture of tympanic membrane of both sides  -     Amoxicillin-Pot Clavulanate; Take 1 tablet by mouth 2 (two) times daily for 10 days.  Dispense: 20 tablet; Refill: 0    -sent in augmentin as written    3. Weight gain  -     TSH W Reflex To Free T4; Future; Expected date: 09/13/2024  -     Zepbound; Inject 2.5 mg into the skin once a week for 4 doses.  Dispense: 2 mL; Refill: 0    -TSH in February normal but given weight gain since then ordered recheck to ensure normal.   -given patient has been trialing diet and exercise consisently for at least 6 months with minimal to no weight loss do feel patient would benefit from medication assisted weight loss  -discussed weight loss medication options, specifically zepbound and wegovy, discussed side effect profile  -patient does have obesity related comorbidity of hyperlipidemia  -medication would be used in conjunction with continued diet and exercise    4. Class 1 obesity with serious comorbidity and body mass index (BMI) of 31.0 to 31.9 in adult, unspecified obesity type  -     Zepbound; Inject 2.5 mg into the skin once a week for 4 doses.  Dispense: 2 mL; Refill: 0    -given patient has been trialing diet and exercise consisently for at least 6 months with minimal to no  weight loss do feel patient would benefit from medication assisted weight loss  -discussed weight loss medication options, specifically zepbound and wegovy, discussed side effect profile  -patient does have obesity related comorbidity of hyperlipidemia  -medication would be used in conjunction with continued diet and exercise    5. Mixed hyperlipidemia  -     Zepbound; Inject 2.5 mg into the skin once a week for 4 doses.  Dispense: 2 mL; Refill: 0    -obesity related comorbidity   -ordered updated levels    6. Nontraumatic compression fracture of L1 vertebra, sequela  7. Compression fracture of T9 vertebra, sequela  8. Osteoarthritis of spine with radiculopathy, lumbar region  9. Lumbar spondylosis  -patient remains active. Tramadol was not helpful so not taking it.   -will see if weight loss assists with decreasing pain    10. Mixed bipolar I disorder (HCC)  -stable. No change to current regimen. Is reestablishing with psych again later this month    11. Low vitamin B12 level  -     Cyanocobalamin; Take 1 capsule by mouth daily.  Dispense: 90 capsule; Refill: 1  -     Vitamin B12; Future; Expected date: 09/13/2024    -sent in refill. Ordered updated level    12. Migraine without status migrainosus, not intractable, unspecified migraine type  -     SUMAtriptan Succinate; Take one tablet by mouth at onset of migraine. May take another tablet 2 hours later if needed.  Dispense: 9 tablet; Refill: 0    -will trial sumatriptan given frequent migraines. If not helpful or patient unable to tolerate will trial sending in ubrelvy versus referral to neurology    13. Hiatal hernia  14. Diverticulosis of colon without hemorrhage  -stable doing well. No need for medications for now    15. Primary osteoarthritis of left knee  -slightly worsening since weight gain. Will attempt for weight loss medications as above    16. TB lung, latent    -stable doing well. No evidence of reactivation     17. Family history of malignant neoplasm  of breast  18. Breast cancer screening by mammogram  -     [facility] ROGELIO 2D+3D SCREENING BILAT (CPT=77067/85951); Future; Expected date: 09/13/2024    -due ordered    19. Deviated septum  Stable doing well. Not bothersome to patient    20. Meibomian gland dysfunction (MGD), bilateral, both upper and lower lids  21. Hyperopia of both eyes with astigmatism and presbyopia  22. Age-related nuclear cataract of both eyes  -stable doing well. Following with ophtho per patient    23. Immunization not carried out because of patient decision  -declines shingles vaccine    24. Vitamin D deficiency  -     Vitamin D; Future; Expected date: 09/13/2024    -ordered updated level      Mounika Mueller MD, 9/13/2024, 11:14 AM

## 2024-09-13 NOTE — TELEPHONE ENCOUNTER
Requested Prescriptions     Pending Prescriptions Disp Refills    LINZESS 145 MCG Oral Cap [Pharmacy Med Name: LINZESS 145MCG CAPSULES] 90 capsule 0     Sig: TAKE 1 CAPSULE BY MOUTH DAILY         LOV  5/10/2024      LR   5/10/2024      MN

## 2024-09-17 ENCOUNTER — TELEPHONE (OUTPATIENT)
Facility: CLINIC | Age: 64
End: 2024-09-17

## 2024-09-17 DIAGNOSIS — E66.9 CLASS 1 OBESITY WITH SERIOUS COMORBIDITY AND BODY MASS INDEX (BMI) OF 31.0 TO 31.9 IN ADULT, UNSPECIFIED OBESITY TYPE: ICD-10-CM

## 2024-09-17 DIAGNOSIS — R63.5 WEIGHT GAIN: Primary | ICD-10-CM

## 2024-09-17 NOTE — TELEPHONE ENCOUNTER
Patient called pharmacy and they informed patient that PA needed for zepbound.     Please initiate request

## 2024-09-19 NOTE — TELEPHONE ENCOUNTER
Please inform patient of medication denial. I can place a referral to our medical bariatrics clinic to discuss further options if patient is interested.     Mounika Mueller MD, 09/19/24, 10:23 AM

## 2024-09-19 NOTE — TELEPHONE ENCOUNTER
Spoke to patient, she is interested in the referral. She has been provided the information. Please see pended.    Please sign referral and close encounter.  Patient will call to schedule.

## 2024-09-24 ENCOUNTER — TELEPHONE (OUTPATIENT)
Facility: CLINIC | Age: 64
End: 2024-09-24

## 2024-09-30 NOTE — TELEPHONE ENCOUNTER
Form is on your desk  
Letter written. Will have staff fax to insurance    Mounika Mueller MD, 09/24/24, 7:27 PM    
Noted    Mounika Mueller MD, 09/30/24, 2:05 PM    
Patient  and friend  López calling ( name and date of birth of patient verified ) about insurance denying Zepound  BUT can do an appeal for her weight loss,  if weight is causing harm to patient      ( See TE  9/17/24 )       ( Patient has no MyChart nor any way to get the form to us )     [facility]  -- please contact the insurance and see if they  can send  form to our office     BC / BS   number  for appeals  [phone]     Fax number [phone]    Patient does has appointment  to see Bariatrics but does not want to wait that long due to the pain on her spine     Future Appointments   Date Time Provider Department Center   10/10/2024  9:00 AM Sparkle Alcazar MD [id number] None             ( López provided with our fax number [phone] as he juan pablo try to get patient  to possibly help send our office the form )         Best call back number: Inga  at [phone]    
Patient's friend López called, on ALEX, patient is with him and gives us permission to speak with him about her health concerns.  Patient received an NANDA letter from [employer], asking her to fill out a form that will allow insurance to speak with Dr Mueller about medication issue.  They will fill it out on their end and fax to insurance.  They want Dr Mueller to be aware she will be contacted by insurance about her appeal for Zepbound.  FYI to Dr Mueller.  
Willy called back on this.  States form was faxed to office.  Staff to review and complete appeal.    
Male

## 2024-10-29 ENCOUNTER — TELEPHONE (OUTPATIENT)
Facility: CLINIC | Age: 64
End: 2024-10-29

## 2024-10-29 NOTE — TELEPHONE ENCOUNTER
The patient called and wanted a callback from Dr. Mueller or her staff. It's about her weight loss shot. Pre-authorization is what she is waiting for. She just wants an update on it.

## 2024-10-30 NOTE — TELEPHONE ENCOUNTER
Dr. Mueller, please advise    Mounika Mueller MD       9/30/24  2:05 PM  Note      Noted     Mounika Mueller MD, 09/30/24, 2:05 PM                  9/30/24  1:53 PM  Kimi Paredes, RN routed this conversation to Mounika Mueller MD Mrozla, Bridget, RN       9/30/24  1:53 PM  Note      Patient's friend López called, on ALEX, patient is with him and gives us permission to speak with him about her health concerns.  Patient received an NANDA letter from [employer], asking her to fill out a form that will allow insurance to speak with Dr Mueller about medication issue.  They will fill it out on their end and fax to insurance.  They want Dr Mueller to be aware she will be contacted by insurance about her appeal for Zepbound.  FYI to Dr Mueller.

## 2024-10-31 NOTE — TELEPHONE ENCOUNTER
Patient and López called back.   They did speak to insurance.   They were told if Dr. Mueller does 2 things, the appeal for Zepbound should be approved.  They were told patient does NOT have to change her pharmacy benefit plan.     1) Word everything correctly  2) Stress medically necessary    López and patient are going to re-fax the Authorized Representative Designation Form to insurance again in the next few days. This will give Dr. Mueller authorization to represent the patient for the appeal.     López is asking to let Dr. Mueller know that patient is in tremendous pain. All of current medications are not working.     López is providing assistance with toileting, other ADL's and repositioning. Patient often crying due to pain. López is experiencing care giver stress.     Request any pain management changes that can be done for patient at this time.

## 2024-10-31 NOTE — TELEPHONE ENCOUNTER
Spoke to patient, full name and date of birth verified.  Patient called to request an urgent call from Dr. Mueller.   RN informed patient of Dr. Mueller's documentation from this morning that she has not heard anything back from the insurance.     RN asked when authorization letter was faxed back to [employer], patient stated it was sent back on 10/8/24.     RN asked for the fax # and was given #[phone].     While on the phone, patient and López noticed the fax receipt said \"page not sent\". They will follow up with [employer].    RN reviewed insurance denial letter scanned into patient's chart on 9/27/24 - Provider #[phone] spoke to Michael.     Patient's current insurance plan excludes ALL weight loss medications.   Michael stated there are some new plans available that will cover weight loss medications.    RN called patient/López back. Explained the above. They will call [facility] to see if changing plan for Zepbound coverage is possible and how long that would take to take effect, any cost, etc.    Patient/López to call us back if they are able to make this switch.

## 2024-10-31 NOTE — TELEPHONE ENCOUNTER
Thank you for clarifying. Then I will continue to address the weight loss appeal. All other concerns including uncontrolled pain can be sent to PCP    Mounika Mueller MD, 10/31/24, 5:48 PM

## 2024-10-31 NOTE — TELEPHONE ENCOUNTER
Thank you so much for taking the time to investigate and review this with the patient. I greatly appreciate!    Mounika Mueller MD, 10/31/24, 3:20 PM

## 2024-10-31 NOTE — TELEPHONE ENCOUNTER
Is patient switching to me? otherwise patient should send all other concerns to PCP. I will continue to address weight loss medication since I sent that in for patient    Mounika Mueller MD, 10/31/24, 4:26 PM

## 2024-10-31 NOTE — TELEPHONE ENCOUNTER
Sorry further update. I have already written a letter of appeal and medical necessity. If insurance has already received this letter there is nothing further I can do. Patient will need to change insurance plans or pay for zepbound out of pocket using [employer]illy[employer]    Mounika Mueller MD, 10/31/24, 5:49 PM

## 2024-10-31 NOTE — TELEPHONE ENCOUNTER
Dr. Mueller - patient did not say she wants to switch to you, but they are designating you for the Zepbound appeal.

## 2024-11-01 ENCOUNTER — TELEPHONE (OUTPATIENT)
Facility: CLINIC | Age: 64
End: 2024-11-01

## 2024-11-01 DIAGNOSIS — S22.070S COMPRESSION FRACTURE OF T9 VERTEBRA, SEQUELA: ICD-10-CM

## 2024-11-01 DIAGNOSIS — M48.56XS NONTRAUMATIC COMPRESSION FRACTURE OF L1 VERTEBRA, SEQUELA: Primary | ICD-10-CM

## 2024-11-01 RX ORDER — TRAMADOL HYDROCHLORIDE 100 MG/1
100 TABLET, COATED ORAL 2 TIMES DAILY
Qty: 60 TABLET | Refills: 0 | Status: SHIPPED | OUTPATIENT
Start: 2024-11-01

## 2024-11-01 NOTE — TELEPHONE ENCOUNTER
Dr. Akers - patient willing to try Tramadol 100 mg, pended    Left message for López to call back for recommendations from Dr. Akers.  Spoke to patient, full name and date of birth verified.    Condition update:  Patient states her pain begins in the center of her back, radiates down her spine, radiates into both legs.     Describes pain as \"crushing pain\".     Patient states she needs López to help her get in and out of bed, assist her with sitting on the toilet and getting off the toilet, getting in and out of the tub.     Patient states she is basically bed-ridden. When López is not home, she does not want to get up on her own.     Patient has tried lying in a recliner and also on an air mattress on the floor.     Nothing helps. Tramadol 50 mg did nothing for the pain.     Patient is not willing to try pain clinic at this time, she feels she has to lose weight first before anything will get better.     Patient fears winding up in a wheelchair or worse.    Patient agreed to try Tramadol 100 mg. (Order pended).     Patient asked to change her pharmacy from [facility] to [facility], she has had too many problems with [facility].     Preferred pharmacy updated.

## 2024-11-01 NOTE — TELEPHONE ENCOUNTER
Please specify where she is having pain so we can best determine next steps. She can also take 100 mg of Tramadol per dose if 50 mg is not helping, but I will ultimately recommend she see a specialist if she is in this much pain.

## 2024-11-01 NOTE — TELEPHONE ENCOUNTER
Dr. Akers - please advise    Refer to 10/29/24 phone encounter for Dr. Mueller.   Rios and Dr. Mueller have been working with patient on problem with getting patient started on Zepbound.     Patient and significant other López have reported that patient is in terrible pain.   All current medications are not working and López stated the patient often cries.  They are requesting any new possible medication or recommendations from Dr. Akers to better control the patient's pain.     Last office visit 9/13/24

## 2024-11-01 NOTE — TELEPHONE ENCOUNTER
Call from López and patient.  This RN notified them of Dr Mueller's response below.  López insists that Dr Mueller call insurance to speak with them about medical necessity. He states patient filled out a form to allow Dr Mueller to speak insurance on their behalf and faxed to Dr Mueller's office yesterday.    This RN reviewed with patient the letter sent by Dr Mueller 2024, noting that Dr Mueller did assert medical necessity in the letter given patient's comorbidities.  López said \"the insurance person said if the letter was worded appropriately, Zepbound would be approved.\"  He asked Dr Mueller to call [facility] [phone].  This RN called and pressed option 2 for provider line.    Spoke with representative Ignacio about this issue. He spoke with [employer].  They will fax Dr Mueller an AOR form that the doctor needs to fill out and the patient needs to sign.    When form is completed, fax back to [phone], which will initiate an appeal.  Please include member's name, , and Medicaid ID.      Member ID     HEENA CHAVEZ 1960 [id number]     This was a 31 minute encounter.

## 2024-11-01 NOTE — TELEPHONE ENCOUNTER
New tramadol dose sent, but has she scheduled with the neurosurgeon as I recommended this summer?  That would be the next step, but if her pain is that bad that she cannot even get out of bed I would actually recommend she go to the emergency room to rule out any significant pathology.  She may also be eligible for acute rehab if she is not safe to be at home.

## 2024-11-01 NOTE — TELEPHONE ENCOUNTER
Noted will fill out form once received. Thank you for taking the extensive time to address patient need    Mounika Mueller MD, 11/01/24, 9:41 AM

## 2024-11-01 NOTE — TELEPHONE ENCOUNTER
Called patient,verified name and date of birth.  Reviewed results and recommendations from Dr Akers's 11/1/24  note below.  Patient verbalized understanding.  Phone numbers provided for Neurosurgery and Bariatrics as previously referred by Dr Akers to schedule an appointment.  Patient declines to go to ER at this time.

## 2024-11-04 ENCOUNTER — TELEPHONE (OUTPATIENT)
Facility: CLINIC | Age: 64
End: 2024-11-04

## 2024-11-08 NOTE — TELEPHONE ENCOUNTER
Pharmacy requesting refill. No protocol, routed to MD for review. details… decreased ROM due to pain

## 2024-11-13 NOTE — TELEPHONE ENCOUNTER
Patient stated that they do not take illinicare. Spoke to pt. Pt stated that she has tried melatonin in the past for sleep. She stated that it was ineffective.   Pt stated that if the zolpidem does not get covered, she is willing to try something else.   1

## 2025-01-15 ENCOUNTER — NURSE TRIAGE (OUTPATIENT)
Facility: CLINIC | Age: 65
End: 2025-01-15

## 2025-01-15 DIAGNOSIS — R05.2 SUBACUTE COUGH: Primary | ICD-10-CM

## 2025-01-15 NOTE — TELEPHONE ENCOUNTER
I called and spoke with patient and helper  Advised Chest Xray placed, she will have this done.     As it relates to bone graft, advised we will need name, location, provider, address, fax, phone of who she will be seeing to place/request referral.  Patient says she is going to be seen at [facility]. She will call and get more info.   Patient had seen a Dr. Vogel at [facility] and was advised bone graft would be needed and were to have it done but Dr. Vogel no longer practicing at [facility].    Patient or helper to call back with info so we can proceed with referral.

## 2025-01-15 NOTE — TELEPHONE ENCOUNTER
Action Requested: Summary for Provider     []  Critical Lab, Recommendations Needed  [x] Need Additional Advice  []   FYI    []   Need Orders  [] Need Medications Sent to Pharmacy  []  Other     SUMMARY:Pt/Pt's helper -Willy whom started saying he's also a pt of Dr Akers- mentioned pt has a Cough as he did and was given 2 Abx /he got better, pt's cough is more in AM -requesting CXR and Labs regarding her TB-she's a carrier-mentioned Dr aware of pt's medical hx  Advised pt lily to be evaluated for Request, stated it's hard d/t pt health hx- back issues  and will need to give 3 day notice for Transportation   Please advise   2)  Mentioned pt has GI problems and Dental issues -dentures do not fit due to bone loss   Then Dentist will need a Referral -Authorized for Bone graft using Code for Insurance [id number]  Not sure of Dx -referral pending       Reason for call: Cough  Onset: chronic - 8 months                    Reason for Disposition   Cough has been present for > 3 weeks    Protocols used: Cough-A-OH

## 2025-01-15 NOTE — TELEPHONE ENCOUNTER
Chest x-ray ordered for further evaluation if she can go to an [facility] facility closer to her house. Will need managed care to assist with referral requested.

## 2025-01-17 NOTE — TELEPHONE ENCOUNTER
Spoke to patient and helper (name and  of patient verified). They are in contact with Taj from insurance to find a provider to perform oral surgery. They will get in touch with Taj and call back with information for referral.   Confirmed that office staff will wait for a call back from patient and helper, patient and helper verbalize understanding.

## 2025-01-17 NOTE — TELEPHONE ENCOUNTER
[phone]--> voicemail not set up and unable to leave message to call back; patient is not MyChart active [1st attempt]    [phone] (Last signed Verbal Release verified)--> phone continues to ring with out answer [1st attempt]

## 2025-02-10 ENCOUNTER — TELEPHONE (OUTPATIENT)
Facility: CLINIC | Age: 65
End: 2025-02-10

## 2025-02-10 NOTE — TELEPHONE ENCOUNTER
Patient (name and  verified) and her friend, López Forman are on the phone to discuss the patient's ongoing symptom of a cough for several weeks. Patient is requesting a Rx for Zpak. Explained to patient that an office appt is required for an evaluation for ongoing symptoms. Patient states that she is not coming all the way to [place] from [place]. Patient states she is going to the [place] location tomorrow for an xray. Informed patient that there is also an urgent care at that location to be seen and evaluated for her cough symptoms.  Patient verbalized understanding and agrees to plan.

## 2025-02-11 ENCOUNTER — HOSPITAL ENCOUNTER (OUTPATIENT)
Dept: GENERAL RADIOLOGY | Age: 65
Discharge: HOME | End: 2025-02-11
Attending: FAMILY MEDICINE
Payer: MEDICAID

## 2025-02-11 DIAGNOSIS — R05.2 SUBACUTE COUGH: ICD-10-CM

## 2025-02-11 PROCEDURE — 71046 X-RAY EXAM CHEST 2 VIEWS: CPT | Performed by: FAMILY MEDICINE

## 2025-02-13 RX ORDER — AZITHROMYCIN 250 MG/1
TABLET, FILM COATED ORAL
Qty: 6 TABLET | Refills: 0 | Status: SHIPPED | OUTPATIENT
Start: 2025-02-13 | End: 2025-02-18

## 2025-02-19 ENCOUNTER — NURSE TRIAGE (OUTPATIENT)
Facility: CLINIC | Age: 65
End: 2025-02-19

## 2025-02-19 DIAGNOSIS — J01.90 ACUTE SINUSITIS, RECURRENCE NOT SPECIFIED, UNSPECIFIED LOCATION: Primary | ICD-10-CM

## 2025-02-19 NOTE — TELEPHONE ENCOUNTER
Pts friend López called stating that she is scheduled for an appt on 5/20     But she is still experiencing a cough even after during the z-pack    Please advise

## 2025-02-19 NOTE — TELEPHONE ENCOUNTER
Covering for Dr Akers who will return on 2/21/25. Sent in augmentin.    Mounika Mueller MD, 02/19/25, 3:13 PM

## 2025-02-19 NOTE — TELEPHONE ENCOUNTER
Receipt confirmed by pharmacy (2/19/2025  3:13 PM CST)     [phone] (Last signed Verbal Release verified 2/2024)--> no answer    [phone]-->Patient contacted and made aware of Rx sent, reviewed Rx in it's entirety. She was also made aware of the need to update her signed verbal release next time she is in the office. Patient verbalized understanding. No further questions or concerns at this time.

## 2025-02-19 NOTE — TELEPHONE ENCOUNTER
please see message below. I did call patient and she stated that she continues to have the coughing spells not as bad as before. Before she was getting 6 coughing spells and now getting about 3 a day. Patient denied any chest pain or shortness of breath but she does get headaches due to the coughing.  will like to know if she can get a different abx. Per  he had the same thing  and you prescribed him azithromycin and amoxicillin clavulanate but for Inga you only gave her the Azithromycin so he feels she also needs the amoxicillin clavulanate. Please advise.

## 2025-04-18 ENCOUNTER — NURSE TRIAGE (OUTPATIENT)
Facility: CLINIC | Age: 65
End: 2025-04-18

## 2025-04-18 NOTE — TELEPHONE ENCOUNTER
Symptoms sound viral if she has eye redness and a cough.  Would continue with supportive care for now, but if symptoms worsen recommend she get evaluated.

## 2025-04-18 NOTE — TELEPHONE ENCOUNTER
Called patient,verified name and date of birth.  Reviewed recommendations from Dr Akers's 4/18/25 note below.  Patient verbalizes understanding and agrees to plan of care.

## 2025-04-18 NOTE — TELEPHONE ENCOUNTER
Please reply to pool: EM RN TRIAGE  Action Requested: Summary for Provider     []  Critical Lab, Recommendations Needed  [x] Need Additional Advice  []   FYI    []   Need Orders  [] Need Medications Sent to Pharmacy  []  Other     SUMMARY: Patient contacts clinic requesting antibiotics.  Eye crusting and cough that began a few days ago.  Denies fever, chest pain or shortness of breath.  Home covid test negative.  Eye is red, denies vision loss or eye pain.  No acute visits to offer patient.  Did recommend immediate care.  Patient wishes message be sent to provider.     Reason for call: Eye Problem and Cough  Onset: Data Unavailable                       Reason for Disposition   Eye with yellow or green discharge or eyelashes stick together, but NO standing order to call in antibiotic eye drops  (Exception: [facility]; continue triage.)   Cough with no complications   Cough with cold symptoms (e.g., runny nose, postnasal drip, throat clearing)    Protocols used: Eye - Pus or [id number], Cough-A-OH

## 2025-05-20 ENCOUNTER — LAB ENCOUNTER (OUTPATIENT)
Dept: LAB | Age: 65
End: 2025-05-20
Attending: FAMILY MEDICINE
Payer: MEDICARE

## 2025-05-20 ENCOUNTER — OFFICE VISIT (OUTPATIENT)
Facility: CLINIC | Age: 65
End: 2025-05-20
Payer: MEDICARE

## 2025-05-20 VITALS
DIASTOLIC BLOOD PRESSURE: 82 MMHG | BODY MASS INDEX: 31.36 KG/M2 | OXYGEN SATURATION: 98 % | SYSTOLIC BLOOD PRESSURE: 118 MMHG | WEIGHT: 177 LBS | HEART RATE: 72 BPM | HEIGHT: 63 IN

## 2025-05-20 DIAGNOSIS — E53.8 B12 DEFICIENCY: ICD-10-CM

## 2025-05-20 DIAGNOSIS — R05.3 CHRONIC COUGH: Primary | ICD-10-CM

## 2025-05-20 DIAGNOSIS — M48.56XS NONTRAUMATIC COMPRESSION FRACTURE OF L1 VERTEBRA, SEQUELA: ICD-10-CM

## 2025-05-20 DIAGNOSIS — E66.811 OBESITY (BMI 30.0-34.9): ICD-10-CM

## 2025-05-20 DIAGNOSIS — E78.2 MIXED HYPERLIPIDEMIA: ICD-10-CM

## 2025-05-20 DIAGNOSIS — S22.070S COMPRESSION FRACTURE OF T9 VERTEBRA, SEQUELA: ICD-10-CM

## 2025-05-20 DIAGNOSIS — R05.3 CHRONIC COUGH: ICD-10-CM

## 2025-05-20 DIAGNOSIS — S31.109A WOUND OF ABDOMEN: ICD-10-CM

## 2025-05-20 DIAGNOSIS — R21 RASH AND NONSPECIFIC SKIN ERUPTION: ICD-10-CM

## 2025-05-20 LAB
BASOPHILS # BLD AUTO: 0.05 X10(3) UL (ref 0–0.2)
BASOPHILS NFR BLD AUTO: 0.8 %
DEPRECATED RDW RBC AUTO: 45.2 FL (ref 35.1–46.3)
EOSINOPHIL # BLD AUTO: 0.2 X10(3) UL (ref 0–0.7)
EOSINOPHIL NFR BLD AUTO: 3.3 %
ERYTHROCYTE [DISTWIDTH] IN BLOOD BY AUTOMATED COUNT: 13 % (ref 11–15)
HCT VFR BLD AUTO: 40.7 % (ref 35–48)
HGB BLD-MCNC: 13.1 G/DL (ref 12–16)
IMM GRANULOCYTES # BLD AUTO: 0.01 X10(3) UL (ref 0–1)
IMM GRANULOCYTES NFR BLD: 0.2 %
LYMPHOCYTES # BLD AUTO: 2.1 X10(3) UL (ref 1–4)
LYMPHOCYTES NFR BLD AUTO: 35.1 %
MCH RBC QN AUTO: 30.5 PG (ref 26–34)
MCHC RBC AUTO-ENTMCNC: 32.2 G/DL (ref 31–37)
MCV RBC AUTO: 94.9 FL (ref 80–100)
MONOCYTES # BLD AUTO: 0.45 X10(3) UL (ref 0.1–1)
MONOCYTES NFR BLD AUTO: 7.5 %
NEUTROPHILS # BLD AUTO: 3.17 X10 (3) UL (ref 1.5–7.7)
NEUTROPHILS # BLD AUTO: 3.17 X10(3) UL (ref 1.5–7.7)
NEUTROPHILS NFR BLD AUTO: 53.1 %
PLATELET # BLD AUTO: 330 10(3)UL (ref 150–450)
RBC # BLD AUTO: 4.29 X10(6)UL (ref 3.8–5.3)
WBC # BLD AUTO: 6 X10(3) UL (ref 4–11)

## 2025-05-20 PROCEDURE — 96372 THER/PROPH/DIAG INJ SC/IM: CPT | Performed by: FAMILY MEDICINE

## 2025-05-20 PROCEDURE — 99214 OFFICE O/P EST MOD 30 MIN: CPT | Performed by: FAMILY MEDICINE

## 2025-05-20 PROCEDURE — 85025 COMPLETE CBC W/AUTO DIFF WBC: CPT

## 2025-05-20 PROCEDURE — 86480 TB TEST CELL IMMUN MEASURE: CPT

## 2025-05-20 PROCEDURE — 36415 COLL VENOUS BLD VENIPUNCTURE: CPT

## 2025-05-20 RX ORDER — FLUTICASONE PROPIONATE 50 MCG
2 SPRAY, SUSPENSION (ML) NASAL DAILY
Qty: 18 ML | Refills: 1 | Status: SHIPPED | OUTPATIENT
Start: 2025-05-20

## 2025-05-20 RX ORDER — AMITRIPTYLINE HYDROCHLORIDE 10 MG/1
10 TABLET ORAL NIGHTLY
COMMUNITY

## 2025-05-20 RX ORDER — MUPIROCIN 20 MG/G
1 OINTMENT TOPICAL 3 TIMES DAILY
Qty: 30 G | Refills: 0 | Status: SHIPPED | OUTPATIENT
Start: 2025-05-20 | End: 2025-05-30

## 2025-05-20 RX ORDER — CYANOCOBALAMIN 1000 UG/ML
1000 INJECTION, SOLUTION INTRAMUSCULAR; SUBCUTANEOUS ONCE
Status: COMPLETED | OUTPATIENT
Start: 2025-05-20 | End: 2025-05-20

## 2025-05-20 RX ADMIN — CYANOCOBALAMIN 1000 MCG: 1000 INJECTION, SOLUTION INTRAMUSCULAR; SUBCUTANEOUS at 09:47:00

## 2025-05-20 NOTE — PROGRESS NOTES
CC:    Chief Complaint   Patient presents with    Cough     Has a bad cough still    Leg Pain     Due to her weight.       HPI: 65 year old female here to discuss severe cough and bilateral leg pain.  Had a basal cell carcinoma removed from her back many years ago, but she never followed up after the removal.  Now she has itchy spots on her back that are white in color, and worried she may have it again.   Starting in December she developed a severe cough, and it has never fully resolved.  States when she starts coughing she can not stop at times.  Will lay down in bed because she is exhausted. Her chest will hurt and her nose will run.  Tried taking Azithromycin and Augmentin, but it did not help.  Her eyes are also tearing and draining pus as well.   Tried Albuterol, but it did not help.   Has not smoked for over a year, but has smoked off and on since the age of 16 and was never a heavy smoker.   She gets shortness of breath and wheezing with this.   The cough is mostly dry and not productive of phlegm.   Has a history of TB and was treated with medication in the 1990's.   Denies any recent known exposures to TB.   Her weight is putting too much pressure on her spine, and she can not walk well because of this.   She lays in bed all day because of the pain.     ROS:  General:  No fevers but chills, night sweats, fatigue, weight gain   HEENT:  Runny nose, watery eyes, nasal congestion  Cardio:  No chest pain  Pulmonary:  Chronic cough, shortness of breath, wheezing   GI:  No N/V/D  :  No discharge, no dysuria, no polyuria, no hematuria  Dermatologic: Itchy, light rash on the back     Past Medical History[1]    Social History     Socioeconomic History    Marital status: Single     Spouse name: Not on file    Number of children: Not on file    Years of education: Not on file    Highest education level: Not on file   Occupational History    Not on file   Tobacco Use    Smoking status: Former     Current packs/day:  0.00     Types: Cigarettes     Quit date: 1/1/2010     Years since quitting: 15.3    Smokeless tobacco: Never   Vaping Use    Vaping status: Never Used   Substance and Sexual Activity    Alcohol use: No     Alcohol/week: 0.0 standard drinks of alcohol    Drug use: No    Sexual activity: Not on file   Other Topics Concern     Service Not Asked    Blood Transfusions Not Asked    Caffeine Concern Yes     Comment: coffee, 3 cups daily    Occupational Exposure Not Asked    Hobby Hazards Not Asked    Sleep Concern Not Asked    Stress Concern Not Asked    Weight Concern Not Asked    Special Diet Not Asked    Back Care Not Asked    Exercise No    Bike Helmet Not Asked    Seat Belt Not Asked    Self-Exams Not Asked    Grew up on a farm Not Asked    History of tanning Not Asked    Outdoor occupation Not Asked    Pt has a pacemaker No    Pt has a defibrillator No    Breast feeding Not Asked    Reaction to local anesthetic No   Social History Narrative    Not on file     Social Drivers of Health     Food Insecurity: Not on file   Transportation Needs: Not on file   Stress: Not on file   Housing Stability: Not on file       Current Medications[2]    Patient has no known allergies.      Vitals:   Vitals:    05/20/25 0846   BP: 118/82   Pulse: 72   SpO2: 98%   Weight: 177 lb (80.3 kg)   Height: 5' 3\" (1.6 m)       Body mass index is 31.35 kg/m².    Physical:  General:  Alert, appropriate, no acute distress   HEENT: supple, no tonsillar erythema or exudate, no lymphadenopathy, no rhinorrhea, no conjunctival injection or drainage   Cardio:  RRR, no murmurs, S1, S2  Pulmonary:  Clear bilaterally, good air entry, no wheezing, no crackles, no rhonchi   Dermatologic: Midline lower abdominal scar with mild erythema and serosanguinous drainage, no surrounding erythema. Back with hypopigmented patches.     Assessment and Plan: 65-year-old female here to follow-up on chronic cough and multiple concerns.    1. Chronic cough    - Will  check QuantiFERON TB and CBC given previous history of tuberculosis versus latent TB (unclear history provided)  - Also check CT chest due to significant smoking history and persistent cough since December despite negative chest x-ray   - Quantiferon TB Plus; Future  - CBC W Differential W Platelet [E]; Future  - CT CHEST (CPT=71250); Future    2. Rash and nonspecific skin eruption    - Referral to dermatologist given due to previous basal cell carcinoma   - Derm Referral - [facility]    3. Obesity (BMI 30.0-34.9)    - Referral to [facility] weight management clinic given as weight has contributed to significant back pain and lack of mobility  - [facility] - Bety Kelly APRN [address], [address]    4. Mixed hyperlipidemia    - [facility] [address], [address]    5. Nontraumatic compression fracture of L1 vertebra, sequela    - Weight loss will help  - [facility] - Bety Kelly APRN [address], [address]    6. Compression fracture of T9 vertebra, sequela    - Declines PT, but would benefit from weight loss and recommend weight management clinic    7. B12 deficiency    - B12 injection given today, and will return monthly for injections   - cyanocobalamin (Vitamin B12) 1000 MCG/ML injection 1,000 mcg    8. Wound of abdomen    - Superficial wound due to skin friction from underwear, and will start Mupirocin ointment tid x 10 days and avoid wearing tight fitting clothing  - Notify me if not improving or worsening over the next week      Sayda Akers DO  05/20/25  9:09 AM         [1]   Past Medical History:   Anxiety state    Back pain    medical management of pain    Back problem    Bipolar affective (HCC)    Cataract    Chronic dental infection    Colon adenomas    x2    Constipation    Depression    Diverticulosis    Gastritis    GERD (gastroesophageal reflux disease)    Headache    Hiatal  hernia    History of Helicobacter pylori infection    History of Helicobacter pylori infection    Hx of gastroesophageal reflux (GERD)    Migraines    Osteoarthritis    Visual impairment    glasses   [2]   Current Outpatient Medications   Medication Sig Dispense Refill    LINZESS 145 MCG Oral Cap TAKE 1 CAPSULE BY MOUTH DAILY 90 capsule 0    SUMAtriptan 25 MG Oral Tab Take one tablet by mouth at onset of migraine. May take another tablet 2 hours later if needed. 9 tablet 0    clonazePAM 2 MG Oral Tab Take 1 tablet (2 mg total) by mouth in the morning, at noon, and at bedtime. Take 1 tablet 3 x a day 270 tablet 0    polyethylene glycol, PEG 3350-KCl-NaBcb-NaCl-NaSulf, 236 g Oral Recon Soln Take 4,000 mL by mouth As Directed. Take 2,000 mL the night before your procedure and 2,000 mL the morning of your procedure. Take as directed by GI clinic. Okay to substitute for generic. 1 each 0    docusate sodium 100 MG Oral Cap Take 100 mg by mouth 2 (two) times daily. 20 capsule 0    Acetaminophen 500 MG Oral Cap Take 1-2 capsules (500-1,000 mg total) by mouth every 6 (six) hours as needed for Pain. 30 capsule 0    lamoTRIgine 200 MG Oral Tab Take 1 tablet (200 mg total) by mouth daily as needed.      STIMULANT LAXATIVE 8.6-50 MG Oral Tab Take 1 tablet by mouth 2 (two) times daily.      LATUDA 60 MG Oral Tab Take 1 tablet (60 mg total) by mouth daily as needed.  2    amitriptyline 10 MG Oral Tab Take 1 tablet (10 mg total) by mouth nightly. (Patient not taking: Reported on 5/20/2025)      traMADol HCl 100 MG Oral Tab Take 100 mg by mouth 2 (two) times daily. (Patient not taking: Reported on 5/20/2025) 60 tablet 0    traMADol 50 MG Oral Tab Take 1 tablet (50 mg total) by mouth every 6 (six) hours as needed for Pain (severe pain). (Patient not taking: Reported on 5/20/2025) 30 tablet 0    ferrous sulfate 325 (65 FE) MG Oral Tab EC Take 1 tablet (325 mg total) by mouth daily with breakfast. (Patient not taking: Reported on  5/20/2025) 20 tablet 0

## 2025-05-21 ENCOUNTER — TELEPHONE (OUTPATIENT)
Facility: CLINIC | Age: 65
End: 2025-05-21

## 2025-05-21 NOTE — TELEPHONE ENCOUNTER
Spoke with Emily at Dr [facility] Dee Wang office Billing/ referral dept, Date of Birth verified  She stated they rec'd the referral but no pt tel #.   She was provided with pt tel # on file.   See referral placed 5-20-25.    MARK

## 2025-05-22 LAB
M TB IFN-G CD4+ T-CELLS BLD-ACNC: 0.05 IU/ML
M TB TUBERC IFN-G BLD QL: NEGATIVE
M TB TUBERC IGNF/MITOGEN IGNF CONTROL: 1.64 IU/ML
QFT TB1 AG MINUS NIL: 0.02 IU/ML
QFT TB2 AG MINUS NIL: 0.07 IU/ML

## 2025-05-22 NOTE — TELEPHONE ENCOUNTER
López, patient's friend states Dr. Funez's office informed them that referral needs pre-approval from [employer]? Patient unable to book appointment with their office at this time. Managed care to please assist/review. Thank you.    Verbal auth was received from patient when I called her directly and obtained verbal auth to speak with López regarding her medical needs/concerns.

## 2025-05-31 ENCOUNTER — HOSPITAL ENCOUNTER (OUTPATIENT)
Dept: CT IMAGING | Age: 65
Discharge: HOME | End: 2025-05-31
Attending: FAMILY MEDICINE
Payer: MEDICARE

## 2025-05-31 DIAGNOSIS — R05.3 CHRONIC COUGH: ICD-10-CM

## 2025-05-31 PROCEDURE — 71250 CT THORAX DX C-: CPT | Performed by: FAMILY MEDICINE

## 2025-06-02 ENCOUNTER — TELEPHONE (OUTPATIENT)
Facility: CLINIC | Age: 65
End: 2025-06-02

## 2025-06-02 NOTE — TELEPHONE ENCOUNTER
Called López back as documented under CT result notes.  Started her on prednisone 40 mg daily for 5 days and also placed a referral to pulmonology.  Let him know I would have the office contact him tomorrow morning to provide the referral information, and to call back if symptoms not improving with prednisone.  May need to start Breo as well pending evaluation with pulmonology.

## 2025-06-02 NOTE — TELEPHONE ENCOUNTER
Patient paged me as she had missed my initial call, but I was on the phone with her partner when she called back.  He let me know he would relay the message to her.  See separate telephone encounter and documentation.

## 2025-06-02 NOTE — TELEPHONE ENCOUNTER
Please contact patient or her partner, López, tomorrow morning with referral information for pulmonology so she can schedule an appointment soon.

## 2025-06-02 NOTE — TELEPHONE ENCOUNTER
Patient friend López calling, asking for results of CT results  Patient has cough, no new symptoms but anxious to get results and make a plan.   Patient has been spending a fair amount of time in bed     Please advise. Results in, advised we generally asked for 3-4 business days to review and get back but given patient symptoms (none new, still the same) asking for results once MD reviews.

## 2025-06-03 NOTE — TELEPHONE ENCOUNTER
I called López, notified of below and states understanding. Agreeable to plan.  Provided number, offered to transfer to schedule but he would like to call them on his own.    No other questions at this time, he will be going to  RX this morning for her and start that. Advised to call if not helping or improving symptoms. States understanding.

## 2025-06-14 ENCOUNTER — TELEPHONE (OUTPATIENT)
Facility: CLINIC | Age: 65
End: 2025-06-14

## 2025-06-14 DIAGNOSIS — R05.3 CHRONIC COUGH: Primary | ICD-10-CM

## 2025-06-14 RX ORDER — FLUTICASONE FUROATE AND VILANTEROL 100; 25 UG/1; UG/1
1 POWDER RESPIRATORY (INHALATION) DAILY
Qty: 60 EACH | Refills: 3 | Status: SHIPPED | OUTPATIENT
Start: 2025-06-14

## 2025-06-14 NOTE — TELEPHONE ENCOUNTER
Patient's partner Rich calling on patient's behalf.  States that cough and fatigue are returning.  States oral steroids were extremely helpful.  Patient cough almost resolved and had lots of energy.  Is wondering what else could be done.  Will trial sending in Breo as per PCP notes.  Patient verbalized understanding and agreement with the plan.    Mounika Mueller MD, 06/14/25, 1:49 PM

## 2025-08-08 ENCOUNTER — OFFICE VISIT (OUTPATIENT)
Dept: PULMONOLOGY | Facility: CLINIC | Age: 65
End: 2025-08-08

## 2025-08-08 VITALS
HEIGHT: 63 IN | HEART RATE: 73 BPM | WEIGHT: 174 LBS | DIASTOLIC BLOOD PRESSURE: 74 MMHG | BODY MASS INDEX: 30.83 KG/M2 | SYSTOLIC BLOOD PRESSURE: 112 MMHG | OXYGEN SATURATION: 96 %

## 2025-08-08 DIAGNOSIS — R91.8 PULMONARY NODULES: ICD-10-CM

## 2025-08-08 DIAGNOSIS — Z87.891 PERSONAL HISTORY OF TOBACCO USE, PRESENTING HAZARDS TO HEALTH: ICD-10-CM

## 2025-08-08 DIAGNOSIS — R05.3 CHRONIC COUGH: Primary | ICD-10-CM

## 2025-08-08 DIAGNOSIS — R06.09 DYSPNEA ON EXERTION: ICD-10-CM

## 2025-08-08 DIAGNOSIS — J43.2 CENTRILOBULAR EMPHYSEMA (HCC): ICD-10-CM

## 2025-08-08 PROCEDURE — 99214 OFFICE O/P EST MOD 30 MIN: CPT | Performed by: PHYSICIAN ASSISTANT

## 2025-08-08 RX ORDER — FLUTICASONE FUROATE, UMECLIDINIUM BROMIDE AND VILANTEROL TRIFENATATE 100; 62.5; 25 UG/1; UG/1; UG/1
1 POWDER RESPIRATORY (INHALATION) DAILY
Qty: 1 EACH | Refills: 5 | Status: SHIPPED | OUTPATIENT
Start: 2025-08-08

## 2025-08-08 RX ORDER — ALBUTEROL SULFATE 90 UG/1
INHALANT RESPIRATORY (INHALATION)
Qty: 1 EACH | Refills: 5 | Status: SHIPPED | OUTPATIENT
Start: 2025-08-08

## 2025-08-11 PROBLEM — Z51.81 ENCOUNTER FOR THERAPEUTIC DRUG MONITORING: Status: ACTIVE | Noted: 2025-08-11

## 2025-08-11 PROBLEM — E78.5 DYSLIPIDEMIA: Status: ACTIVE | Noted: 2025-08-11

## 2025-08-12 ENCOUNTER — OFFICE VISIT (OUTPATIENT)
Dept: INTERNAL MEDICINE CLINIC | Facility: CLINIC | Age: 65
End: 2025-08-12

## 2025-08-12 VITALS
SYSTOLIC BLOOD PRESSURE: 118 MMHG | DIASTOLIC BLOOD PRESSURE: 78 MMHG | WEIGHT: 172 LBS | HEIGHT: 60.5 IN | RESPIRATION RATE: 20 BRPM | BODY MASS INDEX: 32.9 KG/M2 | HEART RATE: 89 BPM

## 2025-08-12 DIAGNOSIS — M47.26 OSTEOARTHRITIS OF SPINE WITH RADICULOPATHY, LUMBAR REGION: ICD-10-CM

## 2025-08-12 DIAGNOSIS — E78.5 DYSLIPIDEMIA: Primary | ICD-10-CM

## 2025-08-12 DIAGNOSIS — J43.9 PULMONARY EMPHYSEMA, UNSPECIFIED EMPHYSEMA TYPE (HCC): ICD-10-CM

## 2025-08-12 DIAGNOSIS — E66.811 CLASS 1 OBESITY WITHOUT SERIOUS COMORBIDITY WITH BODY MASS INDEX (BMI) OF 33.0 TO 33.9 IN ADULT, UNSPECIFIED OBESITY TYPE: ICD-10-CM

## 2025-08-12 DIAGNOSIS — Z51.81 ENCOUNTER FOR THERAPEUTIC DRUG MONITORING: ICD-10-CM

## 2025-08-12 DIAGNOSIS — S22.070S COMPRESSION FRACTURE OF T9 VERTEBRA, SEQUELA: ICD-10-CM

## 2025-08-12 DIAGNOSIS — M17.12 PRIMARY OSTEOARTHRITIS OF LEFT KNEE: ICD-10-CM

## 2025-08-12 DIAGNOSIS — M47.816 LUMBAR SPONDYLOSIS: ICD-10-CM

## 2025-08-12 DIAGNOSIS — E55.9 VITAMIN D DEFICIENCY: ICD-10-CM

## 2025-08-12 PROCEDURE — 99214 OFFICE O/P EST MOD 30 MIN: CPT | Performed by: NURSE PRACTITIONER

## 2025-08-15 ENCOUNTER — TELEPHONE (OUTPATIENT)
Dept: CASE MANAGEMENT | Age: 65
End: 2025-08-15

## 2025-08-15 ENCOUNTER — ORDER TRANSCRIPTION (OUTPATIENT)
Dept: ADMINISTRATIVE | Facility: HOSPITAL | Age: 65
End: 2025-08-15

## 2025-08-15 DIAGNOSIS — R05.3 CHRONIC COUGH: Primary | ICD-10-CM

## 2025-08-18 ENCOUNTER — TELEPHONE (OUTPATIENT)
Dept: INTERNAL MEDICINE CLINIC | Facility: CLINIC | Age: 65
End: 2025-08-18

## 2025-08-18 DIAGNOSIS — R63.8 CRAVING FOR PARTICULAR FOOD: Primary | ICD-10-CM

## 2025-08-19 ENCOUNTER — TELEPHONE (OUTPATIENT)
Dept: INTERNAL MEDICINE CLINIC | Facility: CLINIC | Age: 65
End: 2025-08-19

## 2025-08-19 RX ORDER — NALTREXONE HYDROCHLORIDE 50 MG/1
50 TABLET, FILM COATED ORAL DAILY
Qty: 30 TABLET | Refills: 2 | Status: SHIPPED | OUTPATIENT
Start: 2025-08-19

## 2025-08-31 ENCOUNTER — ORDER TRANSCRIPTION (OUTPATIENT)
Dept: ADMINISTRATIVE | Facility: HOSPITAL | Age: 65
End: 2025-08-31

## 2025-08-31 DIAGNOSIS — Z13.6 SCREENING FOR CARDIOVASCULAR CONDITION: Primary | ICD-10-CM

## (undated) DEVICE — TOTAL KNEE: Brand: MEDLINE INDUSTRIES, INC.

## (undated) DEVICE — GOWN SURG XL DISP LEV 3 AERO BLU RAGLAN SL

## (undated) DEVICE — 2T11 #2 PDO 36 X 36: Brand: 2T11 #2 PDO 36 X 36

## (undated) DEVICE — DISPOSABLE TOURNIQUET CUFF SINGLE BLADDER, DUAL PORT AND QUICK CONNECT CONNECTOR: Brand: COLOR CUFF

## (undated) DEVICE — HOOD: Brand: FLYTE

## (undated) DEVICE — GIJAW SINGLE-USE BIOPSY FORCEPS WITH NEEDLE: Brand: GIJAW

## (undated) DEVICE — CO2 CANNULA,SSOFT,ADLT,7O2,4CO2,FEMALE: Brand: MEDLINE

## (undated) DEVICE — VIOLET BRAIDED (POLYGLACTIN 910), SYNTHETIC ABSORBABLE SUTURE: Brand: COATED VICRYL

## (undated) DEVICE — 2DE14 2-0 PDO 24 X 24: Brand: 2DE14 2-0 PDO 24 X 24

## (undated) DEVICE — SHORT THREADED PINS PACK: Brand: KNEE INSTRUMENTS

## (undated) DEVICE — SCREWS PACK: Brand: KNEE INSTRUMENTS

## (undated) DEVICE — ADHESIVE LIQ 2/3ML VI MASTISOL

## (undated) DEVICE — SPONGE GZ 4XL4IN 100% COT 12 PLY TYP VII WVN

## (undated) DEVICE — 60 ML SYRINGE LUER-LOCK TIP: Brand: MONOJECT

## (undated) DEVICE — DRAPE SHEET LG

## (undated) DEVICE — TRAY CATH 16FR F INCLUDE BARDX IC COMPLT CARE

## (undated) DEVICE — STERILE POLYISOPRENE POWDER-FREE SURGICAL GLOVES: Brand: PROTEXIS

## (undated) DEVICE — Device: Brand: STABLECUT®

## (undated) DEVICE — BANDAGE,GAUZE,BULKEE II,4.5"X4.1YD,STRL: Brand: MEDLINE

## (undated) DEVICE — MYKNEE PPS STD FEMDISCUTBLOCK-MRI-GMK-LM-#4: Brand: MYKNEE PPS

## (undated) DEVICE — 3M™ STERI-DRAPE™ U-DRAPE 1015: Brand: STERI-DRAPE™

## (undated) DEVICE — COTTON ROLL: Brand: DEROYAL

## (undated) DEVICE — SUTURE VCRL SZ 2-0 L27IN ABSRB UD L24MM FS-1

## (undated) DEVICE — SYRINGE, LUER SLIP, STERILE, 60ML: Brand: MEDLINE

## (undated) DEVICE — DRESSING 10X4IN ANMC SAFETAC

## (undated) DEVICE — NEEDLE SPNL 20GA L3.5IN YEL HUB SS RW FIT

## (undated) DEVICE — KIT CLEAN ENDOKIT 1.1OZ GOWNX2

## (undated) DEVICE — APPLICATOR SKIN PREP 26ML HI LT ORNG 2% CHG

## (undated) DEVICE — MYKNEE PPS MIS TIBCUTBLOCK-MRI-GMK-LM-#3: Brand: MYKNEE PPS

## (undated) DEVICE — SMOOTH PINS PACK: Brand: KNEE INSTRUMENTS

## (undated) DEVICE — BANDAGE COMPR W6INXL11YD WVN COT/E CLP CLSR

## (undated) DEVICE — ADHESIVE SKIN TOP FOR WND CLSR DERMBND ADV

## (undated) DEVICE — 450 ML BOTTLE OF 0.05% CHLORHEXIDINE GLUCONATE IN 99.95% STERILE WATER FOR IRRIGATION, USP AND APPLICATOR.: Brand: IRRISEPT ANTIMICROBIAL WOUND LAVAGE

## (undated) DEVICE — MYKNEE FEMUR BONE MODEL LEFT: Brand: MY KNEE BONE MODELS

## (undated) DEVICE — GAMMEX® NON-LATEX PI ORTHO SIZE 9, STERILE POLYISOPRENE POWDER-FREE SURGICAL GLOVE: Brand: GAMMEX

## (undated) DEVICE — SOLUTION IRRIG 3000ML 0.9% NACL FLX CONT

## (undated) DEVICE — KIT ENDO ORCAPOD 160/180/190

## (undated) DEVICE — MYKNEE MIS TIBIAL BONE MODEL LEFT MEDIAL: Brand: MY KNEE BONE MODELS

## (undated) DEVICE — WRAP COOLING KNEE W/ICE PILLOW

## (undated) DEVICE — GAMMEX® PI HYBRID SIZE 9, STERILE POWDER-FREE SURGICAL GLOVE, POLYISOPRENE AND NEOPRENE BLEND: Brand: GAMMEX

## (undated) DEVICE — CEMENT MIXING SYSTEM WITH FEMORAL BREAKWAY NOZZLE: Brand: REVOLUTION

## (undated) DEVICE — GAMMEX® NON-LATEX PI ORTHO SIZE 8.5, STERILE POLYISOPRENE POWDER-FREE SURGICAL GLOVE: Brand: GAMMEX

## (undated) NOTE — MR AVS SNAPSHOT
[facility]  [address] [place] [address], [address] [place] [address]               Thank you for choosing us for your health care visit with Keyla Can MD.  We are glad to serve you and happy to provide you with this summary of methocarbamol 750 MG Tabs   Take 1 tablet (750 mg total) by mouth 3 (three) times daily.    Commonly known as:  ROBAXIN-750           omeprazole 20 MG Cpdr   TAKE ONE CAPSULE BY MOUTH TWO TIMES A DAY   Commonly known as:  PRILOSEC           Ziprasidone HCl

## (undated) NOTE — MR AVS SNAPSHOT
[facility] [address] [address]  [phone]  [phone]               Thank you for choosing us for your health care visit with Shakeel Espana MD.  We are glad to serve you and happy to provide you with ergocalciferol 04712 units Caps   Take 1 capsule (50,000 Units total) by mouth once a week.    Commonly known as:  DRISDOL/VITAMIN D2           * HYDROcodone-acetaminophen  MG Tabs   Take 1 tablet by mouth every 4 (four) hours as needed for Pa If you have questions, you can call [phone] to talk to our [facility] Staff. Remember, [employer] is NOT to be used for urgent needs. For medical emergencies, dial 911. Visit [email or web address] to learn more.            Visit [email or web address]

## (undated) NOTE — LETTER
01/15/20          Luna Correia  :  1960      To Whom It May Concern: This patient was seen in our office on 01/15/20   . The patient is cleared to have surgery as related to her treatments for migraines.       If this office may be of further as

## (undated) NOTE — LETTER
09/10/18        [address] [place] [address] [address] [facility] [place] [address]      Dear Parvez Tate,    [facility] records indicate that you have outstanding lab work and or testing that was ordered for you and has not yet been completed:  Orders Placed This Encounter

## (undated) NOTE — MR AVS SNAPSHOT
[address] [facility]  [phone]               Thank you for choosing us for your health care visit with Deng Mckeon.  Girma Rogers MD.  We are glad to serve you and happy to provide you with this summar Take 1 tablet (750 mg total) by mouth 3 (three) times daily.    Commonly known as:  ROBAXIN-750           omeprazole 20 MG Cpdr   TAKE ONE CAPSULE BY MOUTH TWO TIMES A DAY   Commonly known as:  PRILOSEC           Ziprasidone HCl 80 MG Caps   Take 1 tablet d Follow-Up Care:  Referring physician will continue to manage patient's care    Assoc Dx:  Snoring [R06.83]          NephroPlus     Call the [employer] for assistance with your inactive NephroPlus account    If you have questions, you can call [phone] to nel Visit [facility] online at  [email or web address]

## (undated) NOTE — LETTER
AUTHORIZATION FOR SURGICAL OPERATION OR OTHER PROCEDURE    1. I hereby authorize AGUILA Ardon, and [facility] staff assigned to my case to perform the following operation and/or procedure at the [facility]:    Cortisone injection___Left knee____________________________________      _______________________________________________________________________________________________    2. My physician has explained the nature and purpose of the operation or other procedure, possible alternative methods of treatment, the risks involved, and the possibility of complication to me. I acknowledge that no guarantee has been made as to the result that may be obtained. 3.  I recognize that, during the course of this operation, or other procedure, unforseen conditions may necessitate additional or different procedure than those listed above. I, therefore, further authorize and request that the above named physician, his/her physician assistants or designees perform such procedures as are, in his/her professional opinion, necessary and desirable. 4.  Any tissue or organs removed in the operation or other procedure may be disposed of by and at the discretion of the [facility] and [facility]. 5.  I understand that in the event of a medical emergency, I will be transported by local paramedics to [facility] or other hospital emergency department. 6.  I certify that I have read and fully understand the above consent to operation and/or other procedure. 7.  I acknowledge that my physician has explained sedation/analgesia administration to me including the risks and benefits. I consent to the administration of sedation/analgesia as may be necessary or desirable in the judgement of my physician. Witness signature: ___________________________________________________ Date:  ______/______/_____                    Time:  ________ A. M.  P.M.        Patient Name: ______________________________________________________  (please print)      Patient signature:  ___________________________________________________             Relationship to Patient:           []  Parent    Responsible person                          []  Spouse  In case of minor or                    [] Other  _____________   Incompetent name:  __________________________________________________                               (please print)      _____________      Responsible person  In case of minor or  Incompetent signature:  _______________________________________________    Statement of Physician  My signature below affirms that prior to the time of the procedure, I have explained to the patient and/or his/her guardian, the risks and benefits involved in the proposed treatment and any reasonable alternative to the proposed treatment. I have also explained the risks and benefits involved in the refusal of the proposed treatment and have answered the patient's questions.                         Date:  ______/______/_______  Provider                      Signature:  __________________________________________________________       Time:  ___________ A.M    P.M.

## (undated) NOTE — LETTER
Inga Rizo        [address]        [place] [address]  3/25/2024           Dear Inga,    As requested, enclosed you will find the referral for the Ophthalmologist. Please call them to schedule an appointment.    If you have any questions please contact our office at [phone].      Sincerely,    Mounika Mueller, [address] [address]                              Document electronically generated by:  Salome LORENZO RN

## (undated) NOTE — LETTER
[facility]  [address], [place], [place]    Authorization for Surgical Operation and Procedure                               I hereby authorize Matt Jerome MD, my physician and his/her assistants (if applicable), which may include medical students, residents, and/or fellows, to perform the following surgical operation/ procedure and administer such anesthesia as may be determined necessary by my physician: Operation/Procedure name (s) COLONOSCOPY on Inga Rizo   2.   I recognize that during the surgical operation/procedure, unforeseen conditions may necessitate additional or different procedures than those listed above.  I, therefore, further authorize and request that the above-named surgeon, assistants, or designees perform such procedures as are, in their judgment, necessary and desirable.    3.   My surgeon/physician has discussed prior to my surgery the potential benefits, risks and side effects of this procedure; the likelihood of achieving goals; and potential problems that might occur during recuperation.  They also discussed reasonable alternatives to the procedure, including risks, benefits, and side effects related to the alternatives and risks related to not receiving this procedure.  I have had all my questions answered and I acknowledge that no guarantee has been made as to the result that may be obtained.    4.   Should the need arise during my operation/procedure, which includes change of level of care prior to discharge, I also consent to the administration of blood and/or blood products.  Further, I understand that despite careful testing and screening of blood or blood products by collecting agencies, I may still be subject to ill effects as a result of receiving a blood transfusion and/or blood products.  The following are some, but not all, of the potential risks that can occur: fever and allergic reactions, hemolytic reactions, transmission of diseases such as  Hepatitis, AIDS and Cytomegalovirus (CMV) and fluid overload.  In the event that I wish to have an autologous transfusion of my own blood, or a directed donor transfusion, I will discuss this with my physician.  Check only if Refusing Blood or Blood Products  I understand refusal of blood or blood products as deemed necessary by my physician may have serious consequences to my condition to include possible death. I hereby assume responsibility for my refusal and release the hospital, its personnel, and my physicians from any responsibility for the consequences of my refusal.    o  Refuse   5.   I authorize the use of any specimen, organs, tissues, body parts or foreign objects that may be removed from my body during the operation/procedure for diagnosis, research or teaching purposes and their subsequent disposal by hospital authorities.  I also authorize the release of specimen test results and/or written reports to my treating physician on the hospital medical staff or other referring or consulting physicians involved in my care, at the discretion of the Pathologist or my treating physician.    6.   I consent to the photographing or videotaping of the operations or procedures to be performed, including appropriate portions of my body for medical, scientific, or educational purposes, provided my identity is not revealed by the pictures or by descriptive texts accompanying them.  If the procedure has been photographed/videotaped, the surgeon will obtain the original picture, image, videotape or CD.  The hospital will not be responsible for storage, release or maintenance of the picture, image, tape or CD.    7.   I consent to the presence of a  or observers in the operating room as deemed necessary by my physician or their designees.    8.   I recognize that in the event my procedure results in extended X-Ray/fluoroscopy time, I may develop a skin reaction.    9. If I have a Do Not Attempt  Resuscitation (DNAR) order in place, that status will be suspended while in the operating room, procedural suite, and during the recovery period unless otherwise explicitly stated by me (or a person authorized to consent on my behalf). The surgeon or my attending physician will determine when the applicable recovery period ends for purposes of reinstating the DNAR order.  10. Patients having a sterilization procedure: I understand that if the procedure is successful the results will be permanent and it will therefore be impossible for me to inseminate, conceive, or bear children.  I also understand that the procedure is intended to result in sterility, although the result has not been guaranteed.   11. I acknowledge that my physician has explained sedation/analgesia administration to me including the risk and benefits I consent to the administration of sedation/analgesia as may be necessary or desirable in the judgment of my physician.    I CERTIFY THAT I HAVE READ AND FULLY UNDERSTAND THE ABOVE CONSENT TO OPERATION and/or OTHER PROCEDURE.     ____________________________________  _________________________________        ______________________________  Signature of Patient    Signature of Responsible Person                Printed Name of Responsible Person                                      ____________________________________  _____________________________                ________________________________  Signature of Witness        Date  Time         Relationship to Patient    STATEMENT OF PHYSICIAN My signature below affirms that prior to the time of the procedure; I have explained to the patient and/or his/her legal representative, the risks and benefits involved in the proposed treatment and any reasonable alternative to the proposed treatment. I have also explained the risks and benefits involved in refusal of the proposed treatment and alternatives to the proposed treatment and have answered the patient's  questions. If I have a significant financial interest in a co-management agreement or a significant financial interest in any product or implant, or other significant relationship used in this procedure/surgery, I have disclosed this and had a discussion with my patient.     _____________________________________________________              _____________________________  (Signature of Physician)                                                                                         (Date)                                   (Time)  Patient Name: Inga Rizo      : 1960      Printed: 2024     Medical Record #: [id number]                                      Page 1 of 1

## (undated) NOTE — LETTER
03/27/20      Rosalinda Sharma  [address] [place] [address]      Dear Eboni Hidalgo,    Our records indicate that you have outstanding lab work and or testing that was ordered for you and has not yet been completed:  Orders Placed This Encounter      CBC Martha Boston

## (undated) NOTE — LETTER
[facility]  Administration of Anesthesia  IInga agree to be cared for by a physician anesthesiologist alone and/or with a nurse anesthetist, who is specially trained to monitor me and give me medicine to put me to sleep or keep me comfortable during my procedure    I understand that my anesthesiologist and/or anesthetist is not an employee or agent of [facility] or [employer]. He or she works for [facility]    As the patient asking for anesthesia services, I agree to:  Allow the anesthesiologist (anesthesia doctor) to give me medicine and do additional procedures as necessary. Some examples are: Starting or using an “IV” to give me medicine, fluids or blood during my procedure, and having a breathing tube placed to help me breathe when I’m asleep (intubation). In the event that my heart stops working properly, I understand that my anesthesiologist will make every effort to sustain my life, unless otherwise directed by [facility] Do Not Resuscitate documents.  Tell my anesthesia doctor before my procedure:  If I am pregnant.  The last time that I ate or drank.  iii. All of the medicines I take (including prescriptions, herbal supplements, and pills I can buy without a prescription (including street drugs/illegal medications). Failure to inform my anesthesiologist about these medicines may increase my risk of anesthetic complications.  iv.If I am allergic to anything or have had a reaction to anesthesia before.  I understand how the anesthesia medicine will help me (benefits).  I understand that with any type of anesthesia medicine there are risks:  The most common risks are: nausea, vomiting, sore throat, muscle soreness, damage to my eyes, mouth, or teeth (from breathing tube placement).  Rare risks include: remembering what happened during my procedure, allergic reactions to medications, injury to my airway, heart, lungs, vision, nerves, or muscles  and in extremely rare instances death.  My doctor has explained to me other choices available to me for my care (alternatives).  Pregnant Patients (“epidural”):  I understand that the risks of having an epidural (medicine given into my back to help control pain during labor), include itching, low blood pressure, difficulty urinating, headache or slowing of the baby’s heart. Very rare risks include infection, bleeding, seizure, irregular heart rhythms and nerve injury.  Regional Anesthesia (“spinal”, “epidural”, & “nerve blocks”):  I understand that rare but potential complications include headache, bleeding, infection, seizure, irregular heart rhythms, and nerve injury.    _____________________________________________________________________________  Patient (or Representative) Signature/Relationship to Patient  Date   Time    _____________________________________________________________________________   Name (if used)    Language/Organization   Time    _____________________________________________________________________________  Nurse Anesthetist Signature     Date   Time  _____________________________________________________________________________  Anesthesiologist Signature     Date   Time  I have discussed the procedure and information above with the patient (or patient’s representative) and answered their questions. The patient or their representative has agreed to have anesthesia services.    _____________________________________________________________________________  Witness        Date   Time  I have verified that the signature is that of the patient or patient’s representative, and that it was signed before the procedure  Patient Name: Inga Rizo     : 1960                 Printed: 2024 at 7:06 AM    Medical Record #: [id number]                                            Page 1 of 1  ----------ANESTHESIA CONSENT----------

## (undated) NOTE — LETTER
3/4/2024              Inga Rizo        [address] [place] [id number]         To Whom It May Concern,    Patient, Inga Rizo, is under my care. Please be advised that she has medical conditions such as spinal stenosis, previous knee replacement, and history of thoracic vertebral fracture. Due to these conditions she is unable to sit and/or drive for prolonged periods of time, including no longer than 10 minutes. She also has no means of transportation.    If you require additional information, please contact our office.            Sincerely,    Sayda Akers,   [address] [address] [phone]                    Document electronically generated by:  Kisha BRADEN RN

## (undated) NOTE — MR AVS SNAPSHOT
[facility]  [address] [place] [address], [address] [facility]  Thera Sam               Thank you for choosing us for your health care visit with Janelle Pulliam MD.  We are glad to serve you and happy to provide you with this summary of yo Commonly known as:  Evie [address]   Start taking on:  1/20/2017           lamoTRIgine 200 MG Tabs   Take 200 mg by mouth daily. Commonly known as:  LAMICTAL           methocarbamol 750 MG Tabs   Take 1 tablet (750 mg total) by mouth 3 (three) times daily.    Comm schedule your appointment. Failure to obtain required authorization numbers can create reimbursement difficulties for you.     Assoc Dx:  Deviated septum [J34.2], Snoring [R06.83]          Harry     Call the helpdesk for assistance with your inactive [facility]

## (undated) NOTE — MR AVS SNAPSHOT
Aldo [address] [facility] [address] [address]  [phone]  [phone]               Thank you for choosing us for your health care visit with SHAHEEN Reynolds.   We are glad to serve you and happy to provide you wi lamoTRIgine 200 MG Tabs   Take 200 mg by mouth daily. Commonly known as:  LAMICTAL           methocarbamol 750 MG Tabs   Take 1 tablet (750 mg total) by mouth 3 (three) times daily.    Commonly known as:  ROBAXIN-750           omeprazole 20 MG Cpdr

## (undated) NOTE — LETTER
6/12/2024              Inga Rizo        [address]        [facility] [address]         Dear Inga,    Our records indicate that the tests ordered for you by ZAK Espinoza  have not been done.  If you have, in fact, already completed the tests or you do not wish to have the tests done, please contact our office at THE NUMBER LISTED BELOW.  Otherwise, please proceed with the testing.  Enclosed is a duplicate order for your convenience.  Imaging Order:    XR ABDOMEN, OBSTRUCTIVE SERIES 3 VIEWS(CPT=74021) (Order #[id number]) on 5/10/24       To schedule this test  call Central Scheduling at [phone], Monday through Friday between 7:30am to 6pm and on Saturday between 8am and 1pm.   Evening and weekend appointments for your exam are available.       Sincerely,    ZAK Espinoza  [facility]  [address] [facility] [address]  [facility] [id number] [phone]

## (undated) NOTE — LETTER
24          Inga Rizo  :  1960      To Whom It May Concern:    Patient Inga Rizo is under my care. This letter is being written on her behalf to appeal the decision regarding the recent Zepbound denial.  It is my medical opinion that the patient would greatly benefit from Zepbound.  The patient does have the diagnosis of Obesity with her BMI of 31 (ICD E66.9, Z68.31).  It is well-documented that obesity leads to increased risk of heart attack and stroke.  Patient also has the obesity related comorbidity of hyperlipidemia (ICD E78.2) which further increases her risk of heart attack and stroke.  Patient has been working on diet and exercise under my medical supervision for well over 6 months without success.  Patient has continued to gain weight which is exacerbating her back pain from a T9 compression vertebral fracture (ICD  S22.070A).  The more the strain that increased weight gain places on this vertebral fracture could lead to potential neurologic complications in the future.  It is in patient's best medical interest that a medication like Zepbound or Wegovy be covered in order to prevent the above-stated complications.    If this office may be of further assistance, please do not hesitate to contact us.      Sincerely,        Mounika Mueller MD  NPI: [id number]  Lic #: [id number]